# Patient Record
Sex: FEMALE | Race: WHITE | Employment: OTHER | URBAN - METROPOLITAN AREA
[De-identification: names, ages, dates, MRNs, and addresses within clinical notes are randomized per-mention and may not be internally consistent; named-entity substitution may affect disease eponyms.]

---

## 2017-01-12 ENCOUNTER — CARE COORDINATION (OUTPATIENT)
Dept: CARE COORDINATION | Age: 82
End: 2017-01-12

## 2017-01-16 ENCOUNTER — OFFICE VISIT (OUTPATIENT)
Dept: FAMILY MEDICINE CLINIC | Age: 82
End: 2017-01-16

## 2017-01-16 VITALS
SYSTOLIC BLOOD PRESSURE: 132 MMHG | WEIGHT: 130.2 LBS | DIASTOLIC BLOOD PRESSURE: 78 MMHG | BODY MASS INDEX: 23.96 KG/M2 | HEART RATE: 72 BPM | HEIGHT: 62 IN

## 2017-01-16 DIAGNOSIS — F17.200 TOBACCO USE DISORDER: ICD-10-CM

## 2017-01-16 DIAGNOSIS — I10 HYPERTENSION, ESSENTIAL, BENIGN: Primary | ICD-10-CM

## 2017-01-16 DIAGNOSIS — K59.04 CHRONIC IDIOPATHIC CONSTIPATION: ICD-10-CM

## 2017-01-16 PROCEDURE — G8427 DOCREV CUR MEDS BY ELIG CLIN: HCPCS | Performed by: FAMILY MEDICINE

## 2017-01-16 PROCEDURE — 4040F PNEUMOC VAC/ADMIN/RCVD: CPT | Performed by: FAMILY MEDICINE

## 2017-01-16 PROCEDURE — 99213 OFFICE O/P EST LOW 20 MIN: CPT | Performed by: FAMILY MEDICINE

## 2017-01-16 PROCEDURE — G8400 PT W/DXA NO RESULTS DOC: HCPCS | Performed by: FAMILY MEDICINE

## 2017-01-16 PROCEDURE — 1090F PRES/ABSN URINE INCON ASSESS: CPT | Performed by: FAMILY MEDICINE

## 2017-01-16 PROCEDURE — G8420 CALC BMI NORM PARAMETERS: HCPCS | Performed by: FAMILY MEDICINE

## 2017-01-16 PROCEDURE — G8484 FLU IMMUNIZE NO ADMIN: HCPCS | Performed by: FAMILY MEDICINE

## 2017-01-16 PROCEDURE — 4004F PT TOBACCO SCREEN RCVD TLK: CPT | Performed by: FAMILY MEDICINE

## 2017-01-16 PROCEDURE — 1123F ACP DISCUSS/DSCN MKR DOCD: CPT | Performed by: FAMILY MEDICINE

## 2017-01-16 ASSESSMENT — PATIENT HEALTH QUESTIONNAIRE - PHQ9
SUM OF ALL RESPONSES TO PHQ QUESTIONS 1-9: 0
1. LITTLE INTEREST OR PLEASURE IN DOING THINGS: 0
2. FEELING DOWN, DEPRESSED OR HOPELESS: 0
SUM OF ALL RESPONSES TO PHQ9 QUESTIONS 1 & 2: 0

## 2017-04-17 ENCOUNTER — OFFICE VISIT (OUTPATIENT)
Dept: FAMILY MEDICINE CLINIC | Age: 82
End: 2017-04-17

## 2017-04-17 VITALS
SYSTOLIC BLOOD PRESSURE: 130 MMHG | BODY MASS INDEX: 21.85 KG/M2 | WEIGHT: 128 LBS | DIASTOLIC BLOOD PRESSURE: 80 MMHG | HEART RATE: 70 BPM | HEIGHT: 64 IN

## 2017-04-17 DIAGNOSIS — R31.9 URINARY TRACT INFECTION WITH HEMATURIA, SITE UNSPECIFIED: Primary | ICD-10-CM

## 2017-04-17 DIAGNOSIS — E46 MALNUTRITION (HCC): ICD-10-CM

## 2017-04-17 DIAGNOSIS — N39.0 URINARY TRACT INFECTION WITH HEMATURIA, SITE UNSPECIFIED: Primary | ICD-10-CM

## 2017-04-17 LAB
BILIRUBIN, POC: NORMAL
BLOOD URINE, POC: NORMAL
CLARITY, POC: NORMAL
COLOR, POC: NORMAL
GLUCOSE URINE, POC: NORMAL
KETONES, POC: NORMAL
LEUKOCYTE EST, POC: NORMAL
NITRITE, POC: NORMAL
PH, POC: 5.5
PROTEIN, POC: NORMAL
SPECIFIC GRAVITY, POC: 1.02
UROBILINOGEN, POC: 0.2

## 2017-04-17 PROCEDURE — 4004F PT TOBACCO SCREEN RCVD TLK: CPT | Performed by: FAMILY MEDICINE

## 2017-04-17 PROCEDURE — G8427 DOCREV CUR MEDS BY ELIG CLIN: HCPCS | Performed by: FAMILY MEDICINE

## 2017-04-17 PROCEDURE — G8419 CALC BMI OUT NRM PARAM NOF/U: HCPCS | Performed by: FAMILY MEDICINE

## 2017-04-17 PROCEDURE — 99214 OFFICE O/P EST MOD 30 MIN: CPT | Performed by: FAMILY MEDICINE

## 2017-04-17 PROCEDURE — G8400 PT W/DXA NO RESULTS DOC: HCPCS | Performed by: FAMILY MEDICINE

## 2017-04-17 PROCEDURE — 4040F PNEUMOC VAC/ADMIN/RCVD: CPT | Performed by: FAMILY MEDICINE

## 2017-04-17 PROCEDURE — 81002 URINALYSIS NONAUTO W/O SCOPE: CPT | Performed by: FAMILY MEDICINE

## 2017-04-17 PROCEDURE — 1123F ACP DISCUSS/DSCN MKR DOCD: CPT | Performed by: FAMILY MEDICINE

## 2017-04-17 PROCEDURE — 1090F PRES/ABSN URINE INCON ASSESS: CPT | Performed by: FAMILY MEDICINE

## 2017-04-20 ENCOUNTER — CARE COORDINATION (OUTPATIENT)
Dept: CARE COORDINATION | Age: 82
End: 2017-04-20

## 2017-04-20 ENCOUNTER — TELEPHONE (OUTPATIENT)
Dept: FAMILY MEDICINE CLINIC | Age: 82
End: 2017-04-20

## 2017-04-21 RX ORDER — CIPROFLOXACIN 250 MG/1
250 TABLET, FILM COATED ORAL 2 TIMES DAILY
Qty: 10 TABLET | Refills: 0 | Status: SHIPPED | OUTPATIENT
Start: 2017-04-21 | End: 2017-04-26

## 2017-04-27 ENCOUNTER — TELEPHONE (OUTPATIENT)
Dept: FAMILY MEDICINE CLINIC | Age: 82
End: 2017-04-27

## 2017-05-02 ENCOUNTER — TELEPHONE (OUTPATIENT)
Dept: FAMILY MEDICINE CLINIC | Age: 82
End: 2017-05-02

## 2017-05-05 ENCOUNTER — OFFICE VISIT (OUTPATIENT)
Dept: FAMILY MEDICINE CLINIC | Age: 82
End: 2017-05-05

## 2017-05-05 ENCOUNTER — TELEPHONE (OUTPATIENT)
Dept: FAMILY MEDICINE CLINIC | Age: 82
End: 2017-05-05

## 2017-05-05 VITALS
DIASTOLIC BLOOD PRESSURE: 80 MMHG | SYSTOLIC BLOOD PRESSURE: 140 MMHG | BODY MASS INDEX: 22.6 KG/M2 | WEIGHT: 122.8 LBS | HEART RATE: 74 BPM | HEIGHT: 62 IN

## 2017-05-05 DIAGNOSIS — E53.8 VITAMIN B12 DEFICIENCY: Primary | ICD-10-CM

## 2017-05-05 DIAGNOSIS — F03.90 DEMENTIA WITHOUT BEHAVIORAL DISTURBANCE, UNSPECIFIED DEMENTIA TYPE: ICD-10-CM

## 2017-05-05 LAB
A/G RATIO: 1 (ref 1.1–2.2)
ALBUMIN SERPL-MCNC: 3.2 G/DL (ref 3.4–5)
ALP BLD-CCNC: 63 U/L (ref 40–129)
ALT SERPL-CCNC: 21 U/L (ref 10–40)
ANION GAP SERPL CALCULATED.3IONS-SCNC: 14 MMOL/L (ref 3–16)
AST SERPL-CCNC: 27 U/L (ref 15–37)
BASOPHILS ABSOLUTE: 0 K/UL (ref 0–0.2)
BASOPHILS RELATIVE PERCENT: 0.5 %
BILIRUB SERPL-MCNC: 0.4 MG/DL (ref 0–1)
BUN BLDV-MCNC: 9 MG/DL (ref 7–20)
CALCIUM SERPL-MCNC: 8.7 MG/DL (ref 8.3–10.6)
CHLORIDE BLD-SCNC: 93 MMOL/L (ref 99–110)
CO2: 24 MMOL/L (ref 21–32)
CREAT SERPL-MCNC: 0.6 MG/DL (ref 0.6–1.2)
EOSINOPHILS ABSOLUTE: 0 K/UL (ref 0–0.6)
EOSINOPHILS RELATIVE PERCENT: 0.6 %
FOLATE: 13.92 NG/ML (ref 4.78–24.2)
GFR AFRICAN AMERICAN: >60
GFR NON-AFRICAN AMERICAN: >60
GLOBULIN: 3.3 G/DL
GLUCOSE BLD-MCNC: 119 MG/DL (ref 70–99)
HCT VFR BLD CALC: 37.2 % (ref 36–48)
HEMOGLOBIN: 12.4 G/DL (ref 12–16)
LYMPHOCYTES ABSOLUTE: 0.9 K/UL (ref 1–5.1)
LYMPHOCYTES RELATIVE PERCENT: 31.1 %
MCH RBC QN AUTO: 27.9 PG (ref 26–34)
MCHC RBC AUTO-ENTMCNC: 33.2 G/DL (ref 31–36)
MCV RBC AUTO: 84.2 FL (ref 80–100)
MONOCYTES ABSOLUTE: 0.5 K/UL (ref 0–1.3)
MONOCYTES RELATIVE PERCENT: 18 %
NEUTROPHILS ABSOLUTE: 1.4 K/UL (ref 1.7–7.7)
NEUTROPHILS RELATIVE PERCENT: 49.8 %
PDW BLD-RTO: 16.3 % (ref 12.4–15.4)
PLATELET # BLD: 353 K/UL (ref 135–450)
PMV BLD AUTO: 7.4 FL (ref 5–10.5)
POTASSIUM SERPL-SCNC: 4.4 MMOL/L (ref 3.5–5.1)
RBC # BLD: 4.42 M/UL (ref 4–5.2)
SODIUM BLD-SCNC: 131 MMOL/L (ref 136–145)
TOTAL PROTEIN: 6.5 G/DL (ref 6.4–8.2)
TSH SERPL DL<=0.05 MIU/L-ACNC: 2.7 UIU/ML (ref 0.27–4.2)
VITAMIN B-12: 752 PG/ML (ref 211–911)
WBC # BLD: 2.8 K/UL (ref 4–11)

## 2017-05-05 PROCEDURE — 4040F PNEUMOC VAC/ADMIN/RCVD: CPT | Performed by: FAMILY MEDICINE

## 2017-05-05 PROCEDURE — G8400 PT W/DXA NO RESULTS DOC: HCPCS | Performed by: FAMILY MEDICINE

## 2017-05-05 PROCEDURE — 1090F PRES/ABSN URINE INCON ASSESS: CPT | Performed by: FAMILY MEDICINE

## 2017-05-05 PROCEDURE — 4004F PT TOBACCO SCREEN RCVD TLK: CPT | Performed by: FAMILY MEDICINE

## 2017-05-05 PROCEDURE — 1123F ACP DISCUSS/DSCN MKR DOCD: CPT | Performed by: FAMILY MEDICINE

## 2017-05-05 PROCEDURE — G8419 CALC BMI OUT NRM PARAM NOF/U: HCPCS | Performed by: FAMILY MEDICINE

## 2017-05-05 PROCEDURE — 99214 OFFICE O/P EST MOD 30 MIN: CPT | Performed by: FAMILY MEDICINE

## 2017-05-05 PROCEDURE — 36415 COLL VENOUS BLD VENIPUNCTURE: CPT | Performed by: FAMILY MEDICINE

## 2017-05-05 PROCEDURE — G8427 DOCREV CUR MEDS BY ELIG CLIN: HCPCS | Performed by: FAMILY MEDICINE

## 2017-05-05 RX ORDER — FLUOXETINE HYDROCHLORIDE 10 MG/1
10 CAPSULE ORAL DAILY
Qty: 30 CAPSULE | Refills: 3 | Status: ON HOLD | OUTPATIENT
Start: 2017-05-05 | End: 2017-05-16 | Stop reason: HOSPADM

## 2017-05-08 ENCOUNTER — TELEPHONE (OUTPATIENT)
Dept: CARE COORDINATION | Age: 82
End: 2017-05-08

## 2017-05-11 ENCOUNTER — CARE COORDINATION (OUTPATIENT)
Dept: CARE COORDINATION | Age: 82
End: 2017-05-11

## 2017-05-14 PROBLEM — E87.1 HYPONATREMIA: Status: ACTIVE | Noted: 2017-05-14

## 2017-05-14 PROBLEM — R62.7 FAILURE TO THRIVE IN ADULT: Status: ACTIVE | Noted: 2017-05-14

## 2017-05-14 PROBLEM — F33.1 MODERATE EPISODE OF RECURRENT MAJOR DEPRESSIVE DISORDER (HCC): Status: ACTIVE | Noted: 2017-05-14

## 2017-05-17 ENCOUNTER — CARE COORDINATION (OUTPATIENT)
Dept: CASE MANAGEMENT | Age: 82
End: 2017-05-17

## 2017-05-19 ENCOUNTER — CARE COORDINATION (OUTPATIENT)
Dept: CASE MANAGEMENT | Age: 82
End: 2017-05-19

## 2017-05-26 ENCOUNTER — OFFICE VISIT (OUTPATIENT)
Dept: FAMILY MEDICINE CLINIC | Age: 82
End: 2017-05-26

## 2017-05-26 VITALS
WEIGHT: 120.2 LBS | HEART RATE: 72 BPM | HEIGHT: 63 IN | SYSTOLIC BLOOD PRESSURE: 142 MMHG | DIASTOLIC BLOOD PRESSURE: 94 MMHG | BODY MASS INDEX: 21.3 KG/M2

## 2017-05-26 DIAGNOSIS — I10 HYPERTENSION, ESSENTIAL, BENIGN: Primary | ICD-10-CM

## 2017-05-26 DIAGNOSIS — R00.1 BRADYCARDIA: ICD-10-CM

## 2017-05-26 DIAGNOSIS — R41.89 PSEUDODEMENTIA: ICD-10-CM

## 2017-05-26 PROCEDURE — G8419 CALC BMI OUT NRM PARAM NOF/U: HCPCS | Performed by: FAMILY MEDICINE

## 2017-05-26 PROCEDURE — G8427 DOCREV CUR MEDS BY ELIG CLIN: HCPCS | Performed by: FAMILY MEDICINE

## 2017-05-26 PROCEDURE — 1111F DSCHRG MED/CURRENT MED MERGE: CPT | Performed by: FAMILY MEDICINE

## 2017-05-26 PROCEDURE — 1123F ACP DISCUSS/DSCN MKR DOCD: CPT | Performed by: FAMILY MEDICINE

## 2017-05-26 PROCEDURE — 99214 OFFICE O/P EST MOD 30 MIN: CPT | Performed by: FAMILY MEDICINE

## 2017-05-26 PROCEDURE — 1090F PRES/ABSN URINE INCON ASSESS: CPT | Performed by: FAMILY MEDICINE

## 2017-05-26 PROCEDURE — 4004F PT TOBACCO SCREEN RCVD TLK: CPT | Performed by: FAMILY MEDICINE

## 2017-05-26 PROCEDURE — 4040F PNEUMOC VAC/ADMIN/RCVD: CPT | Performed by: FAMILY MEDICINE

## 2017-05-26 PROCEDURE — G8400 PT W/DXA NO RESULTS DOC: HCPCS | Performed by: FAMILY MEDICINE

## 2017-05-26 RX ORDER — FLUOXETINE 10 MG/1
10 CAPSULE ORAL DAILY
Qty: 90 CAPSULE | Refills: 3 | Status: SHIPPED | OUTPATIENT
Start: 2017-05-26 | End: 2017-06-20

## 2017-06-13 ENCOUNTER — CARE COORDINATION (OUTPATIENT)
Dept: CARE COORDINATION | Age: 82
End: 2017-06-13

## 2017-06-19 ENCOUNTER — TELEPHONE (OUTPATIENT)
Dept: FAMILY MEDICINE CLINIC | Age: 82
End: 2017-06-19

## 2017-06-19 ENCOUNTER — OFFICE VISIT (OUTPATIENT)
Dept: FAMILY MEDICINE CLINIC | Age: 82
End: 2017-06-19

## 2017-06-19 VITALS
OXYGEN SATURATION: 97 % | HEART RATE: 83 BPM | TEMPERATURE: 98.3 F | SYSTOLIC BLOOD PRESSURE: 120 MMHG | DIASTOLIC BLOOD PRESSURE: 60 MMHG | WEIGHT: 121 LBS | HEIGHT: 61 IN | RESPIRATION RATE: 16 BRPM | BODY MASS INDEX: 22.84 KG/M2

## 2017-06-19 DIAGNOSIS — E87.1 HYPONATREMIA: ICD-10-CM

## 2017-06-19 DIAGNOSIS — R41.0 CONFUSION: ICD-10-CM

## 2017-06-19 DIAGNOSIS — I49.9 IRREGULAR HEART BEATS: ICD-10-CM

## 2017-06-19 DIAGNOSIS — R63.0 LACK OF APPETITE: Primary | ICD-10-CM

## 2017-06-19 DIAGNOSIS — D70.8 OTHER NEUTROPENIA (HCC): ICD-10-CM

## 2017-06-19 LAB
ALBUMIN SERPL-MCNC: 3.5 G/DL (ref 3.4–5)
ALP BLD-CCNC: 73 U/L (ref 40–129)
ALT SERPL-CCNC: 15 U/L (ref 10–40)
ANION GAP SERPL CALCULATED.3IONS-SCNC: 16 MMOL/L (ref 3–16)
AST SERPL-CCNC: 20 U/L (ref 15–37)
BASOPHILS ABSOLUTE: 0 K/UL (ref 0–0.2)
BASOPHILS RELATIVE PERCENT: 0.4 %
BILIRUB SERPL-MCNC: 0.4 MG/DL (ref 0–1)
BILIRUBIN DIRECT: <0.2 MG/DL (ref 0–0.3)
BILIRUBIN, INDIRECT: NORMAL MG/DL (ref 0–1)
BILIRUBIN, POC: ABNORMAL
BLOOD URINE, POC: ABNORMAL
BUN BLDV-MCNC: 14 MG/DL (ref 7–20)
CALCIUM SERPL-MCNC: 8.9 MG/DL (ref 8.3–10.6)
CHLORIDE BLD-SCNC: 88 MMOL/L (ref 99–110)
CLARITY, POC: CLEAR
CO2: 22 MMOL/L (ref 21–32)
COLOR, POC: YELLOW
CREAT SERPL-MCNC: 0.6 MG/DL (ref 0.6–1.2)
EOSINOPHILS ABSOLUTE: 0 K/UL (ref 0–0.6)
EOSINOPHILS RELATIVE PERCENT: 0.6 %
GFR AFRICAN AMERICAN: >60
GFR NON-AFRICAN AMERICAN: >60
GLUCOSE BLD-MCNC: 94 MG/DL (ref 70–99)
GLUCOSE URINE, POC: ABNORMAL
HCT VFR BLD CALC: 36 % (ref 36–48)
HEMOGLOBIN: 12.2 G/DL (ref 12–16)
KETONES, POC: ABNORMAL
LEUKOCYTE EST, POC: ABNORMAL
LYMPHOCYTES ABSOLUTE: 1.4 K/UL (ref 1–5.1)
LYMPHOCYTES RELATIVE PERCENT: 46.6 %
MCH RBC QN AUTO: 28.6 PG (ref 26–34)
MCHC RBC AUTO-ENTMCNC: 33.9 G/DL (ref 31–36)
MCV RBC AUTO: 84.4 FL (ref 80–100)
MONOCYTES ABSOLUTE: 0.6 K/UL (ref 0–1.3)
MONOCYTES RELATIVE PERCENT: 20.6 %
NEUTROPHILS ABSOLUTE: 1 K/UL (ref 1.7–7.7)
NEUTROPHILS RELATIVE PERCENT: 31.8 %
NITRITE, POC: ABNORMAL
PDW BLD-RTO: 17.2 % (ref 12.4–15.4)
PH, POC: 6
PLATELET # BLD: 299 K/UL (ref 135–450)
PMV BLD AUTO: 6.9 FL (ref 5–10.5)
POTASSIUM SERPL-SCNC: 4.3 MMOL/L (ref 3.5–5.1)
PROTEIN, POC: ABNORMAL
RBC # BLD: 4.26 M/UL (ref 4–5.2)
SODIUM BLD-SCNC: 126 MMOL/L (ref 136–145)
SPECIFIC GRAVITY, POC: 1.01
TOTAL PROTEIN: 6.6 G/DL (ref 6.4–8.2)
UROBILINOGEN, POC: 0.2
WBC # BLD: 3.1 K/UL (ref 4–11)

## 2017-06-19 PROCEDURE — G8400 PT W/DXA NO RESULTS DOC: HCPCS | Performed by: FAMILY MEDICINE

## 2017-06-19 PROCEDURE — 36415 COLL VENOUS BLD VENIPUNCTURE: CPT | Performed by: FAMILY MEDICINE

## 2017-06-19 PROCEDURE — 4040F PNEUMOC VAC/ADMIN/RCVD: CPT | Performed by: FAMILY MEDICINE

## 2017-06-19 PROCEDURE — 99214 OFFICE O/P EST MOD 30 MIN: CPT | Performed by: FAMILY MEDICINE

## 2017-06-19 PROCEDURE — 4004F PT TOBACCO SCREEN RCVD TLK: CPT | Performed by: FAMILY MEDICINE

## 2017-06-19 PROCEDURE — 1090F PRES/ABSN URINE INCON ASSESS: CPT | Performed by: FAMILY MEDICINE

## 2017-06-19 PROCEDURE — 81002 URINALYSIS NONAUTO W/O SCOPE: CPT | Performed by: FAMILY MEDICINE

## 2017-06-19 PROCEDURE — G8427 DOCREV CUR MEDS BY ELIG CLIN: HCPCS | Performed by: FAMILY MEDICINE

## 2017-06-19 PROCEDURE — 93000 ELECTROCARDIOGRAM COMPLETE: CPT | Performed by: FAMILY MEDICINE

## 2017-06-19 PROCEDURE — 1123F ACP DISCUSS/DSCN MKR DOCD: CPT | Performed by: FAMILY MEDICINE

## 2017-06-19 PROCEDURE — G8420 CALC BMI NORM PARAMETERS: HCPCS | Performed by: FAMILY MEDICINE

## 2017-06-19 RX ORDER — ONDANSETRON 4 MG/1
4 TABLET, FILM COATED ORAL EVERY 6 HOURS PRN
Qty: 20 TABLET | Refills: 0 | Status: SHIPPED | OUTPATIENT
Start: 2017-06-19 | End: 2017-08-31 | Stop reason: ALTCHOICE

## 2017-06-20 ENCOUNTER — TELEPHONE (OUTPATIENT)
Dept: FAMILY MEDICINE CLINIC | Age: 82
End: 2017-06-20

## 2017-06-21 LAB — URINE CULTURE, ROUTINE: NORMAL

## 2017-06-23 ENCOUNTER — TELEPHONE (OUTPATIENT)
Dept: PHARMACY | Facility: CLINIC | Age: 82
End: 2017-06-23

## 2017-06-23 ENCOUNTER — CARE COORDINATION (OUTPATIENT)
Dept: CASE MANAGEMENT | Age: 82
End: 2017-06-23

## 2017-06-26 ENCOUNTER — TELEPHONE (OUTPATIENT)
Dept: FAMILY MEDICINE CLINIC | Age: 82
End: 2017-06-26

## 2017-06-26 DIAGNOSIS — E87.1 HYPONATREMIA: Primary | ICD-10-CM

## 2017-06-27 ENCOUNTER — CARE COORDINATION (OUTPATIENT)
Dept: CASE MANAGEMENT | Age: 82
End: 2017-06-27

## 2017-06-28 ENCOUNTER — CARE COORDINATION (OUTPATIENT)
Dept: CARE COORDINATION | Age: 82
End: 2017-06-28

## 2017-07-11 ENCOUNTER — CARE COORDINATOR VISIT (OUTPATIENT)
Dept: CARE COORDINATION | Age: 82
End: 2017-07-11

## 2017-07-11 ENCOUNTER — OFFICE VISIT (OUTPATIENT)
Dept: FAMILY MEDICINE CLINIC | Age: 82
End: 2017-07-11

## 2017-07-11 VITALS
OXYGEN SATURATION: 97 % | SYSTOLIC BLOOD PRESSURE: 152 MMHG | HEIGHT: 61 IN | WEIGHT: 125 LBS | BODY MASS INDEX: 23.6 KG/M2 | DIASTOLIC BLOOD PRESSURE: 87 MMHG | HEART RATE: 95 BPM

## 2017-07-11 DIAGNOSIS — G31.84 MILD COGNITIVE IMPAIRMENT: ICD-10-CM

## 2017-07-11 DIAGNOSIS — R11.0 NAUSEA: ICD-10-CM

## 2017-07-11 DIAGNOSIS — E87.1 HYPONATREMIA: Primary | ICD-10-CM

## 2017-07-11 DIAGNOSIS — R41.89 COGNITIVE DEFICITS: Primary | ICD-10-CM

## 2017-07-11 DIAGNOSIS — F33.1 MODERATE EPISODE OF RECURRENT MAJOR DEPRESSIVE DISORDER (HCC): ICD-10-CM

## 2017-07-11 DIAGNOSIS — D70.8 OTHER NEUTROPENIA (HCC): ICD-10-CM

## 2017-07-11 LAB
ANION GAP SERPL CALCULATED.3IONS-SCNC: 15 MMOL/L (ref 3–16)
BUN BLDV-MCNC: 13 MG/DL (ref 7–20)
CALCIUM SERPL-MCNC: 8.9 MG/DL (ref 8.3–10.6)
CHLORIDE BLD-SCNC: 92 MMOL/L (ref 99–110)
CO2: 24 MMOL/L (ref 21–32)
CREAT SERPL-MCNC: 0.6 MG/DL (ref 0.6–1.2)
GFR AFRICAN AMERICAN: >60
GFR NON-AFRICAN AMERICAN: >60
GLUCOSE BLD-MCNC: 74 MG/DL (ref 70–99)
POTASSIUM SERPL-SCNC: 4.8 MMOL/L (ref 3.5–5.1)
SODIUM BLD-SCNC: 131 MMOL/L (ref 136–145)

## 2017-07-11 PROCEDURE — 99215 OFFICE O/P EST HI 40 MIN: CPT | Performed by: FAMILY MEDICINE

## 2017-07-11 PROCEDURE — 36415 COLL VENOUS BLD VENIPUNCTURE: CPT | Performed by: FAMILY MEDICINE

## 2017-07-11 PROCEDURE — G8420 CALC BMI NORM PARAMETERS: HCPCS | Performed by: FAMILY MEDICINE

## 2017-07-11 PROCEDURE — 1111F DSCHRG MED/CURRENT MED MERGE: CPT | Performed by: FAMILY MEDICINE

## 2017-07-11 PROCEDURE — G8400 PT W/DXA NO RESULTS DOC: HCPCS | Performed by: FAMILY MEDICINE

## 2017-07-11 PROCEDURE — G8427 DOCREV CUR MEDS BY ELIG CLIN: HCPCS | Performed by: FAMILY MEDICINE

## 2017-07-11 PROCEDURE — 1123F ACP DISCUSS/DSCN MKR DOCD: CPT | Performed by: FAMILY MEDICINE

## 2017-07-11 PROCEDURE — 1090F PRES/ABSN URINE INCON ASSESS: CPT | Performed by: FAMILY MEDICINE

## 2017-07-11 PROCEDURE — 4040F PNEUMOC VAC/ADMIN/RCVD: CPT | Performed by: FAMILY MEDICINE

## 2017-07-11 PROCEDURE — 4004F PT TOBACCO SCREEN RCVD TLK: CPT | Performed by: FAMILY MEDICINE

## 2017-07-12 ENCOUNTER — OFFICE VISIT (OUTPATIENT)
Dept: PSYCHOLOGY | Age: 82
End: 2017-07-12

## 2017-07-12 ENCOUNTER — TELEPHONE (OUTPATIENT)
Dept: FAMILY MEDICINE CLINIC | Age: 82
End: 2017-07-12

## 2017-07-12 ENCOUNTER — NURSE ONLY (OUTPATIENT)
Dept: FAMILY MEDICINE CLINIC | Age: 82
End: 2017-07-12

## 2017-07-12 VITALS — SYSTOLIC BLOOD PRESSURE: 142 MMHG | DIASTOLIC BLOOD PRESSURE: 70 MMHG

## 2017-07-12 DIAGNOSIS — F41.9 ANXIETY: Primary | ICD-10-CM

## 2017-07-12 LAB
BASOPHILS ABSOLUTE: 0 K/UL (ref 0–0.2)
BASOPHILS RELATIVE PERCENT: 0.3 %
EOSINOPHILS ABSOLUTE: 0 K/UL (ref 0–0.6)
EOSINOPHILS RELATIVE PERCENT: 0.8 %
HCT VFR BLD CALC: 36 % (ref 36–48)
HEMATOLOGY PATH CONSULT: NO
HEMOGLOBIN: 12.4 G/DL (ref 12–16)
LYMPHOCYTES ABSOLUTE: 1.7 K/UL (ref 1–5.1)
LYMPHOCYTES RELATIVE PERCENT: 56.2 %
MCH RBC QN AUTO: 29.1 PG (ref 26–34)
MCHC RBC AUTO-ENTMCNC: 34.3 G/DL (ref 31–36)
MCV RBC AUTO: 84.9 FL (ref 80–100)
MONOCYTES ABSOLUTE: 0.9 K/UL (ref 0–1.3)
MONOCYTES RELATIVE PERCENT: 30.3 %
NEUTROPHILS ABSOLUTE: 0.4 K/UL (ref 1.7–7.7)
NEUTROPHILS RELATIVE PERCENT: 12.4 %
PDW BLD-RTO: 17.6 % (ref 12.4–15.4)
PLATELET # BLD: 259 K/UL (ref 135–450)
PMV BLD AUTO: 7.4 FL (ref 5–10.5)
RBC # BLD: 4.25 M/UL (ref 4–5.2)
WBC # BLD: 3.1 K/UL (ref 4–11)

## 2017-07-12 PROCEDURE — 90791 PSYCH DIAGNOSTIC EVALUATION: CPT | Performed by: PSYCHOLOGIST

## 2017-07-12 ASSESSMENT — PATIENT HEALTH QUESTIONNAIRE - PHQ9
8. MOVING OR SPEAKING SO SLOWLY THAT OTHER PEOPLE COULD HAVE NOTICED. OR THE OPPOSITE, BEING SO FIGETY OR RESTLESS THAT YOU HAVE BEEN MOVING AROUND A LOT MORE THAN USUAL: 0
10. IF YOU CHECKED OFF ANY PROBLEMS, HOW DIFFICULT HAVE THESE PROBLEMS MADE IT FOR YOU TO DO YOUR WORK, TAKE CARE OF THINGS AT HOME, OR GET ALONG WITH OTHER PEOPLE: 0
7. TROUBLE CONCENTRATING ON THINGS, SUCH AS READING THE NEWSPAPER OR WATCHING TELEVISION: 0
9. THOUGHTS THAT YOU WOULD BE BETTER OFF DEAD, OR OF HURTING YOURSELF: 0
1. LITTLE INTEREST OR PLEASURE IN DOING THINGS: 2
SUM OF ALL RESPONSES TO PHQ QUESTIONS 1-9: 4
5. POOR APPETITE OR OVEREATING: 1
3. TROUBLE FALLING OR STAYING ASLEEP: 0
6. FEELING BAD ABOUT YOURSELF - OR THAT YOU ARE A FAILURE OR HAVE LET YOURSELF OR YOUR FAMILY DOWN: 0
2. FEELING DOWN, DEPRESSED OR HOPELESS: 1
SUM OF ALL RESPONSES TO PHQ9 QUESTIONS 1 & 2: 3
4. FEELING TIRED OR HAVING LITTLE ENERGY: 0

## 2017-07-12 ASSESSMENT — ANXIETY QUESTIONNAIRES
5. BEING SO RESTLESS THAT IT IS HARD TO SIT STILL: 0-NOT AT ALL SURE
7. FEELING AFRAID AS IF SOMETHING AWFUL MIGHT HAPPEN: 3-NEARLY EVERY DAY
1. FEELING NERVOUS, ANXIOUS, OR ON EDGE: 1-SEVERAL DAYS
6. BECOMING EASILY ANNOYED OR IRRITABLE: 0-NOT AT ALL SURE
2. NOT BEING ABLE TO STOP OR CONTROL WORRYING: 2-OVER HALF THE DAYS
3. WORRYING TOO MUCH ABOUT DIFFERENT THINGS: 1-SEVERAL DAYS
GAD7 TOTAL SCORE: 8
4. TROUBLE RELAXING: 1-SEVERAL DAYS

## 2017-07-13 ENCOUNTER — OFFICE VISIT (OUTPATIENT)
Dept: FAMILY MEDICINE CLINIC | Age: 82
End: 2017-07-13

## 2017-07-13 VITALS
WEIGHT: 123 LBS | BODY MASS INDEX: 23.22 KG/M2 | RESPIRATION RATE: 18 BRPM | OXYGEN SATURATION: 98 % | HEART RATE: 100 BPM | DIASTOLIC BLOOD PRESSURE: 68 MMHG | HEIGHT: 61 IN | SYSTOLIC BLOOD PRESSURE: 136 MMHG

## 2017-07-13 DIAGNOSIS — D70.9 NEUTROPENIA, UNSPECIFIED TYPE (HCC): ICD-10-CM

## 2017-07-13 DIAGNOSIS — K59.00 CONSTIPATION, UNSPECIFIED CONSTIPATION TYPE: Primary | ICD-10-CM

## 2017-07-13 PROCEDURE — G8427 DOCREV CUR MEDS BY ELIG CLIN: HCPCS | Performed by: INTERNAL MEDICINE

## 2017-07-13 PROCEDURE — 4004F PT TOBACCO SCREEN RCVD TLK: CPT | Performed by: INTERNAL MEDICINE

## 2017-07-13 PROCEDURE — G8400 PT W/DXA NO RESULTS DOC: HCPCS | Performed by: INTERNAL MEDICINE

## 2017-07-13 PROCEDURE — 1123F ACP DISCUSS/DSCN MKR DOCD: CPT | Performed by: INTERNAL MEDICINE

## 2017-07-13 PROCEDURE — G8420 CALC BMI NORM PARAMETERS: HCPCS | Performed by: INTERNAL MEDICINE

## 2017-07-13 PROCEDURE — 4040F PNEUMOC VAC/ADMIN/RCVD: CPT | Performed by: INTERNAL MEDICINE

## 2017-07-13 PROCEDURE — 1111F DSCHRG MED/CURRENT MED MERGE: CPT | Performed by: INTERNAL MEDICINE

## 2017-07-13 PROCEDURE — 1090F PRES/ABSN URINE INCON ASSESS: CPT | Performed by: INTERNAL MEDICINE

## 2017-07-13 PROCEDURE — 99213 OFFICE O/P EST LOW 20 MIN: CPT | Performed by: INTERNAL MEDICINE

## 2017-07-20 ENCOUNTER — TELEPHONE (OUTPATIENT)
Dept: FAMILY MEDICINE CLINIC | Age: 82
End: 2017-07-20

## 2017-07-24 ENCOUNTER — CARE COORDINATION (OUTPATIENT)
Dept: CARE COORDINATION | Age: 82
End: 2017-07-24

## 2017-07-24 RX ORDER — AMLODIPINE BESYLATE 5 MG/1
5 TABLET ORAL DAILY
Qty: 90 TABLET | Refills: 1 | Status: SHIPPED | OUTPATIENT
Start: 2017-07-24

## 2017-07-25 ENCOUNTER — CARE COORDINATION (OUTPATIENT)
Dept: CARE COORDINATION | Age: 82
End: 2017-07-25

## 2017-07-26 ENCOUNTER — TELEPHONE (OUTPATIENT)
Dept: FAMILY MEDICINE CLINIC | Age: 82
End: 2017-07-26

## 2017-07-27 ENCOUNTER — CARE COORDINATOR VISIT (OUTPATIENT)
Dept: CARE COORDINATION | Age: 82
End: 2017-07-27

## 2017-07-27 ENCOUNTER — OFFICE VISIT (OUTPATIENT)
Dept: FAMILY MEDICINE CLINIC | Age: 82
End: 2017-07-27

## 2017-07-27 VITALS
HEART RATE: 75 BPM | BODY MASS INDEX: 23.03 KG/M2 | HEIGHT: 61 IN | RESPIRATION RATE: 20 BRPM | OXYGEN SATURATION: 97 % | DIASTOLIC BLOOD PRESSURE: 64 MMHG | SYSTOLIC BLOOD PRESSURE: 142 MMHG | WEIGHT: 122 LBS

## 2017-07-27 DIAGNOSIS — E87.1 HYPONATREMIA: ICD-10-CM

## 2017-07-27 DIAGNOSIS — F03.90 MULTIFACTORIAL DEMENTIA (HCC): Primary | ICD-10-CM

## 2017-07-27 LAB
ALBUMIN SERPL-MCNC: 4 G/DL (ref 3.4–5)
ANION GAP SERPL CALCULATED.3IONS-SCNC: 15 MMOL/L (ref 3–16)
BUN BLDV-MCNC: 16 MG/DL (ref 7–20)
CALCIUM SERPL-MCNC: 9.1 MG/DL (ref 8.3–10.6)
CHLORIDE BLD-SCNC: 93 MMOL/L (ref 99–110)
CO2: 24 MMOL/L (ref 21–32)
CREAT SERPL-MCNC: 0.7 MG/DL (ref 0.6–1.2)
GFR AFRICAN AMERICAN: >60
GFR NON-AFRICAN AMERICAN: >60
GLUCOSE BLD-MCNC: 96 MG/DL (ref 70–99)
PHOSPHORUS: 3.4 MG/DL (ref 2.5–4.9)
POTASSIUM SERPL-SCNC: 4.6 MMOL/L (ref 3.5–5.1)
SODIUM BLD-SCNC: 132 MMOL/L (ref 136–145)

## 2017-07-27 PROCEDURE — G8400 PT W/DXA NO RESULTS DOC: HCPCS | Performed by: FAMILY MEDICINE

## 2017-07-27 PROCEDURE — 36415 COLL VENOUS BLD VENIPUNCTURE: CPT | Performed by: FAMILY MEDICINE

## 2017-07-27 PROCEDURE — 4004F PT TOBACCO SCREEN RCVD TLK: CPT | Performed by: FAMILY MEDICINE

## 2017-07-27 PROCEDURE — G8427 DOCREV CUR MEDS BY ELIG CLIN: HCPCS | Performed by: FAMILY MEDICINE

## 2017-07-27 PROCEDURE — 1090F PRES/ABSN URINE INCON ASSESS: CPT | Performed by: FAMILY MEDICINE

## 2017-07-27 PROCEDURE — G8420 CALC BMI NORM PARAMETERS: HCPCS | Performed by: FAMILY MEDICINE

## 2017-07-27 PROCEDURE — 1123F ACP DISCUSS/DSCN MKR DOCD: CPT | Performed by: FAMILY MEDICINE

## 2017-07-27 PROCEDURE — 4040F PNEUMOC VAC/ADMIN/RCVD: CPT | Performed by: FAMILY MEDICINE

## 2017-07-27 PROCEDURE — 99214 OFFICE O/P EST MOD 30 MIN: CPT | Performed by: FAMILY MEDICINE

## 2017-07-28 ENCOUNTER — CARE COORDINATION (OUTPATIENT)
Dept: CARE COORDINATION | Age: 82
End: 2017-07-28

## 2017-07-30 PROBLEM — F03.90 MULTIFACTORIAL DEMENTIA (HCC): Status: ACTIVE | Noted: 2017-07-30

## 2017-07-31 ENCOUNTER — CARE COORDINATOR VISIT (OUTPATIENT)
Dept: CARE COORDINATION | Age: 82
End: 2017-07-31

## 2017-07-31 ENCOUNTER — OFFICE VISIT (OUTPATIENT)
Dept: FAMILY MEDICINE CLINIC | Age: 82
End: 2017-07-31

## 2017-07-31 ENCOUNTER — TELEPHONE (OUTPATIENT)
Dept: FAMILY MEDICINE CLINIC | Age: 82
End: 2017-07-31

## 2017-07-31 ENCOUNTER — CARE COORDINATION (OUTPATIENT)
Dept: CARE COORDINATION | Age: 82
End: 2017-07-31

## 2017-07-31 VITALS
SYSTOLIC BLOOD PRESSURE: 136 MMHG | RESPIRATION RATE: 20 BRPM | DIASTOLIC BLOOD PRESSURE: 60 MMHG | HEART RATE: 126 BPM | BODY MASS INDEX: 23.03 KG/M2 | WEIGHT: 122 LBS | HEIGHT: 61 IN

## 2017-07-31 DIAGNOSIS — S61.412A LACERATION OF LEFT HAND WITHOUT FOREIGN BODY, INITIAL ENCOUNTER: ICD-10-CM

## 2017-07-31 DIAGNOSIS — S61.411A LACERATION OF RIGHT HAND WITHOUT FOREIGN BODY, INITIAL ENCOUNTER: ICD-10-CM

## 2017-07-31 DIAGNOSIS — R00.0 HEART RATE FAST: ICD-10-CM

## 2017-07-31 DIAGNOSIS — I49.8 VENTRICULAR BIGEMINY: Primary | ICD-10-CM

## 2017-07-31 LAB — TSH SERPL DL<=0.05 MIU/L-ACNC: 2.28 UIU/ML (ref 0.27–4.2)

## 2017-07-31 PROCEDURE — G8400 PT W/DXA NO RESULTS DOC: HCPCS | Performed by: FAMILY MEDICINE

## 2017-07-31 PROCEDURE — 4040F PNEUMOC VAC/ADMIN/RCVD: CPT | Performed by: FAMILY MEDICINE

## 2017-07-31 PROCEDURE — 1123F ACP DISCUSS/DSCN MKR DOCD: CPT | Performed by: FAMILY MEDICINE

## 2017-07-31 PROCEDURE — G8427 DOCREV CUR MEDS BY ELIG CLIN: HCPCS | Performed by: FAMILY MEDICINE

## 2017-07-31 PROCEDURE — 4004F PT TOBACCO SCREEN RCVD TLK: CPT | Performed by: FAMILY MEDICINE

## 2017-07-31 PROCEDURE — G8420 CALC BMI NORM PARAMETERS: HCPCS | Performed by: FAMILY MEDICINE

## 2017-07-31 PROCEDURE — 36415 COLL VENOUS BLD VENIPUNCTURE: CPT | Performed by: FAMILY MEDICINE

## 2017-07-31 PROCEDURE — 99214 OFFICE O/P EST MOD 30 MIN: CPT | Performed by: FAMILY MEDICINE

## 2017-07-31 PROCEDURE — 1090F PRES/ABSN URINE INCON ASSESS: CPT | Performed by: FAMILY MEDICINE

## 2017-07-31 PROCEDURE — 93000 ELECTROCARDIOGRAM COMPLETE: CPT | Performed by: FAMILY MEDICINE

## 2017-08-01 ENCOUNTER — CARE COORDINATION (OUTPATIENT)
Dept: CARE COORDINATION | Age: 82
End: 2017-08-01

## 2017-08-03 ENCOUNTER — CARE COORDINATION (OUTPATIENT)
Dept: CARE COORDINATION | Age: 82
End: 2017-08-03

## 2017-08-09 ENCOUNTER — HOSPITAL ENCOUNTER (OUTPATIENT)
Dept: OTHER | Age: 82
Discharge: HOME | End: 2017-08-31
Attending: FAMILY MEDICINE | Admitting: FAMILY MEDICINE

## 2017-08-15 ENCOUNTER — CARE COORDINATION (OUTPATIENT)
Dept: CARE COORDINATION | Age: 82
End: 2017-08-15

## 2017-08-15 RX ORDER — METOPROLOL SUCCINATE 50 MG/1
50 TABLET, EXTENDED RELEASE ORAL
COMMUNITY
Start: 2017-08-14 | End: 2017-08-31 | Stop reason: ALTCHOICE

## 2017-08-31 PROBLEM — R55 SYNCOPE: Status: ACTIVE | Noted: 2017-08-31

## 2017-08-31 PROBLEM — E87.1 HYPONATREMIA: Status: ACTIVE | Noted: 2017-08-31

## 2017-09-12 ENCOUNTER — CARE COORDINATION (OUTPATIENT)
Dept: CASE MANAGEMENT | Age: 82
End: 2017-09-12

## 2017-10-10 ENCOUNTER — CARE COORDINATION (OUTPATIENT)
Dept: CASE MANAGEMENT | Age: 82
End: 2017-10-10

## 2017-10-18 ENCOUNTER — CARE COORDINATION (OUTPATIENT)
Dept: CARE COORDINATION | Age: 82
End: 2017-10-18

## 2019-03-06 ENCOUNTER — OFFICE VISIT (OUTPATIENT)
Dept: ORTHOPEDIC SURGERY | Age: 84
End: 2019-03-06
Payer: COMMERCIAL

## 2019-03-06 VITALS
RESPIRATION RATE: 16 BRPM | WEIGHT: 119 LBS | HEIGHT: 60 IN | DIASTOLIC BLOOD PRESSURE: 67 MMHG | SYSTOLIC BLOOD PRESSURE: 132 MMHG | HEART RATE: 56 BPM | BODY MASS INDEX: 23.36 KG/M2

## 2019-03-06 DIAGNOSIS — M79.672 FOOT PAIN, LEFT: ICD-10-CM

## 2019-03-06 DIAGNOSIS — M20.5X2 CROSSOVER TOE DEFORMITY OF LEFT FOOT: Primary | ICD-10-CM

## 2019-03-06 DIAGNOSIS — M21.612 BUNION, LEFT FOOT: ICD-10-CM

## 2019-03-06 PROCEDURE — 99203 OFFICE O/P NEW LOW 30 MIN: CPT | Performed by: ORTHOPAEDIC SURGERY

## 2019-03-08 ENCOUNTER — ANESTHESIA EVENT (OUTPATIENT)
Dept: OPERATING ROOM | Age: 84
End: 2019-03-08
Payer: COMMERCIAL

## 2019-03-08 RX ORDER — ACETAMINOPHEN 325 MG/1
650 TABLET ORAL EVERY 12 HOURS
COMMUNITY

## 2019-03-08 RX ORDER — TROLAMINE SALICYLATE 10 G/100G
CREAM TOPICAL EVERY 6 HOURS PRN
COMMUNITY

## 2019-03-08 RX ORDER — FUROSEMIDE 20 MG/1
TABLET ORAL
COMMUNITY

## 2019-03-08 RX ORDER — LANOLIN ALCOHOL/MO/W.PET/CERES
3 CREAM (GRAM) TOPICAL
COMMUNITY

## 2019-03-08 RX ORDER — ACETAMINOPHEN 325 MG/1
650 TABLET ORAL
COMMUNITY

## 2019-03-08 RX ORDER — ESCITALOPRAM OXALATE 20 MG/1
20 TABLET ORAL DAILY
COMMUNITY

## 2019-03-08 RX ORDER — RISPERIDONE 0.5 MG/1
0.5 TABLET, FILM COATED ORAL DAILY
COMMUNITY

## 2019-03-08 RX ORDER — CHOLECALCIFEROL (VITAMIN D3) 1250 MCG
1 CAPSULE ORAL WEEKLY
COMMUNITY

## 2019-03-10 PROBLEM — M20.5X2 CROSSOVER TOE DEFORMITY OF LEFT FOOT: Status: ACTIVE | Noted: 2019-03-10

## 2019-03-10 PROBLEM — M21.612 BUNION, LEFT FOOT: Status: ACTIVE | Noted: 2019-03-10

## 2019-03-11 ENCOUNTER — ANESTHESIA (OUTPATIENT)
Dept: OPERATING ROOM | Age: 84
End: 2019-03-11
Payer: COMMERCIAL

## 2019-03-11 ENCOUNTER — HOSPITAL ENCOUNTER (OUTPATIENT)
Age: 84
Setting detail: OUTPATIENT SURGERY
Discharge: HOME | End: 2019-03-11
Attending: ORTHOPAEDIC SURGERY | Admitting: ORTHOPAEDIC SURGERY
Payer: COMMERCIAL

## 2019-03-11 ENCOUNTER — APPOINTMENT (OUTPATIENT)
Dept: GENERAL RADIOLOGY | Age: 84
End: 2019-03-11
Attending: ORTHOPAEDIC SURGERY
Payer: COMMERCIAL

## 2019-03-11 VITALS
HEART RATE: 84 BPM | TEMPERATURE: 97 F | SYSTOLIC BLOOD PRESSURE: 147 MMHG | RESPIRATION RATE: 17 BRPM | WEIGHT: 164.02 LBS | DIASTOLIC BLOOD PRESSURE: 75 MMHG | HEIGHT: 60 IN | BODY MASS INDEX: 32.2 KG/M2 | OXYGEN SATURATION: 94 %

## 2019-03-11 VITALS
SYSTOLIC BLOOD PRESSURE: 144 MMHG | RESPIRATION RATE: 1 BRPM | DIASTOLIC BLOOD PRESSURE: 64 MMHG | OXYGEN SATURATION: 100 %

## 2019-03-11 DIAGNOSIS — M20.5X2 CROSSOVER TOE DEFORMITY OF LEFT FOOT: ICD-10-CM

## 2019-03-11 DIAGNOSIS — M21.612 BUNION, LEFT FOOT: Primary | ICD-10-CM

## 2019-03-11 PROCEDURE — 3600000004 HC SURGERY LEVEL 4 BASE: Performed by: ORTHOPAEDIC SURGERY

## 2019-03-11 PROCEDURE — 6370000000 HC RX 637 (ALT 250 FOR IP): Performed by: ANESTHESIOLOGY

## 2019-03-11 PROCEDURE — 2500000003 HC RX 250 WO HCPCS

## 2019-03-11 PROCEDURE — 28292 COR HLX VLGS RSC PRX PHLX BS: CPT | Performed by: ORTHOPAEDIC SURGERY

## 2019-03-11 PROCEDURE — 2720000010 HC SURG SUPPLY STERILE: Performed by: ORTHOPAEDIC SURGERY

## 2019-03-11 PROCEDURE — 6360000002 HC RX W HCPCS: Performed by: NURSE ANESTHETIST, CERTIFIED REGISTERED

## 2019-03-11 PROCEDURE — 7100000000 HC PACU RECOVERY - FIRST 15 MIN: Performed by: ORTHOPAEDIC SURGERY

## 2019-03-11 PROCEDURE — 28292 COR HLX VLGS RSC PRX PHLX BS: CPT | Performed by: NURSE PRACTITIONER

## 2019-03-11 PROCEDURE — 2500000003 HC RX 250 WO HCPCS: Performed by: ANESTHESIOLOGY

## 2019-03-11 PROCEDURE — 3600000014 HC SURGERY LEVEL 4 ADDTL 15MIN: Performed by: ORTHOPAEDIC SURGERY

## 2019-03-11 PROCEDURE — 2500000003 HC RX 250 WO HCPCS: Performed by: ORTHOPAEDIC SURGERY

## 2019-03-11 PROCEDURE — 7100000001 HC PACU RECOVERY - ADDTL 15 MIN: Performed by: ORTHOPAEDIC SURGERY

## 2019-03-11 PROCEDURE — 3700000001 HC ADD 15 MINUTES (ANESTHESIA): Performed by: ORTHOPAEDIC SURGERY

## 2019-03-11 PROCEDURE — 2580000003 HC RX 258: Performed by: ORTHOPAEDIC SURGERY

## 2019-03-11 PROCEDURE — 7100000010 HC PHASE II RECOVERY - FIRST 15 MIN: Performed by: ORTHOPAEDIC SURGERY

## 2019-03-11 PROCEDURE — 28820 AMPUTATION OF TOE: CPT | Performed by: ORTHOPAEDIC SURGERY

## 2019-03-11 PROCEDURE — 88305 TISSUE EXAM BY PATHOLOGIST: CPT

## 2019-03-11 PROCEDURE — 6360000002 HC RX W HCPCS: Performed by: ORTHOPAEDIC SURGERY

## 2019-03-11 PROCEDURE — 2500000003 HC RX 250 WO HCPCS: Performed by: NURSE ANESTHETIST, CERTIFIED REGISTERED

## 2019-03-11 PROCEDURE — 6360000002 HC RX W HCPCS: Performed by: ANESTHESIOLOGY

## 2019-03-11 PROCEDURE — 7100000011 HC PHASE II RECOVERY - ADDTL 15 MIN: Performed by: ORTHOPAEDIC SURGERY

## 2019-03-11 PROCEDURE — 2580000003 HC RX 258: Performed by: ANESTHESIOLOGY

## 2019-03-11 PROCEDURE — 2709999900 HC NON-CHARGEABLE SUPPLY: Performed by: ORTHOPAEDIC SURGERY

## 2019-03-11 PROCEDURE — 3700000000 HC ANESTHESIA ATTENDED CARE: Performed by: ORTHOPAEDIC SURGERY

## 2019-03-11 RX ORDER — MORPHINE SULFATE 2 MG/ML
2 INJECTION, SOLUTION INTRAMUSCULAR; INTRAVENOUS EVERY 5 MIN PRN
Status: DISCONTINUED | OUTPATIENT
Start: 2019-03-11 | End: 2019-03-11 | Stop reason: HOSPADM

## 2019-03-11 RX ORDER — OXYCODONE HYDROCHLORIDE 5 MG/1
10 TABLET ORAL PRN
Status: COMPLETED | OUTPATIENT
Start: 2019-03-11 | End: 2019-03-11

## 2019-03-11 RX ORDER — OXYCODONE HYDROCHLORIDE 5 MG/1
5 TABLET ORAL PRN
Status: COMPLETED | OUTPATIENT
Start: 2019-03-11 | End: 2019-03-11

## 2019-03-11 RX ORDER — BUPIVACAINE HYDROCHLORIDE 5 MG/ML
INJECTION, SOLUTION EPIDURAL; INTRACAUDAL
Status: COMPLETED | OUTPATIENT
Start: 2019-03-11 | End: 2019-03-11

## 2019-03-11 RX ORDER — LIDOCAINE HYDROCHLORIDE 20 MG/ML
INJECTION, SOLUTION INFILTRATION; PERINEURAL PRN
Status: DISCONTINUED | OUTPATIENT
Start: 2019-03-11 | End: 2019-03-11 | Stop reason: SDUPTHER

## 2019-03-11 RX ORDER — SODIUM CHLORIDE 9 MG/ML
INJECTION, SOLUTION INTRAVENOUS CONTINUOUS
Status: DISCONTINUED | OUTPATIENT
Start: 2019-03-11 | End: 2019-03-11 | Stop reason: HOSPADM

## 2019-03-11 RX ORDER — DEXAMETHASONE SODIUM PHOSPHATE 4 MG/ML
INJECTION, SOLUTION INTRA-ARTICULAR; INTRALESIONAL; INTRAMUSCULAR; INTRAVENOUS; SOFT TISSUE PRN
Status: DISCONTINUED | OUTPATIENT
Start: 2019-03-11 | End: 2019-03-11 | Stop reason: SDUPTHER

## 2019-03-11 RX ORDER — FENTANYL CITRATE 50 UG/ML
INJECTION, SOLUTION INTRAMUSCULAR; INTRAVENOUS PRN
Status: DISCONTINUED | OUTPATIENT
Start: 2019-03-11 | End: 2019-03-11 | Stop reason: SDUPTHER

## 2019-03-11 RX ORDER — MAGNESIUM HYDROXIDE 1200 MG/15ML
LIQUID ORAL CONTINUOUS PRN
Status: COMPLETED | OUTPATIENT
Start: 2019-03-11 | End: 2019-03-11

## 2019-03-11 RX ORDER — ONDANSETRON 2 MG/ML
4 INJECTION INTRAMUSCULAR; INTRAVENOUS
Status: DISCONTINUED | OUTPATIENT
Start: 2019-03-11 | End: 2019-03-11 | Stop reason: HOSPADM

## 2019-03-11 RX ORDER — MORPHINE SULFATE 2 MG/ML
1 INJECTION, SOLUTION INTRAMUSCULAR; INTRAVENOUS EVERY 5 MIN PRN
Status: DISCONTINUED | OUTPATIENT
Start: 2019-03-11 | End: 2019-03-11 | Stop reason: HOSPADM

## 2019-03-11 RX ORDER — DIPHENHYDRAMINE HCL 25 MG
25 TABLET ORAL EVERY 6 HOURS PRN
Status: DISCONTINUED | OUTPATIENT
Start: 2019-03-11 | End: 2019-03-11

## 2019-03-11 RX ORDER — GLYCOPYRROLATE 0.2 MG/ML
INJECTION INTRAMUSCULAR; INTRAVENOUS PRN
Status: DISCONTINUED | OUTPATIENT
Start: 2019-03-11 | End: 2019-03-11 | Stop reason: SDUPTHER

## 2019-03-11 RX ORDER — FENTANYL CITRATE 50 UG/ML
25 INJECTION, SOLUTION INTRAMUSCULAR; INTRAVENOUS EVERY 5 MIN PRN
Status: DISCONTINUED | OUTPATIENT
Start: 2019-03-11 | End: 2019-03-11 | Stop reason: HOSPADM

## 2019-03-11 RX ORDER — SODIUM CHLORIDE 0.9 % (FLUSH) 0.9 %
10 SYRINGE (ML) INJECTION PRN
Status: DISCONTINUED | OUTPATIENT
Start: 2019-03-11 | End: 2019-03-11 | Stop reason: HOSPADM

## 2019-03-11 RX ORDER — TRAMADOL HYDROCHLORIDE 50 MG/1
50 TABLET ORAL EVERY 6 HOURS PRN
Qty: 12 TABLET | Refills: 0 | Status: SHIPPED | OUTPATIENT
Start: 2019-03-11 | End: 2019-03-14

## 2019-03-11 RX ORDER — ONDANSETRON 2 MG/ML
INJECTION INTRAMUSCULAR; INTRAVENOUS PRN
Status: DISCONTINUED | OUTPATIENT
Start: 2019-03-11 | End: 2019-03-11 | Stop reason: SDUPTHER

## 2019-03-11 RX ORDER — FENTANYL CITRATE 50 UG/ML
50 INJECTION, SOLUTION INTRAMUSCULAR; INTRAVENOUS EVERY 5 MIN PRN
Status: DISCONTINUED | OUTPATIENT
Start: 2019-03-11 | End: 2019-03-11 | Stop reason: HOSPADM

## 2019-03-11 RX ORDER — CEPHALEXIN 500 MG/1
500 CAPSULE ORAL 4 TIMES DAILY
Qty: 20 CAPSULE | Refills: 0 | Status: SHIPPED | OUTPATIENT
Start: 2019-03-11 | End: 2019-03-16

## 2019-03-11 RX ORDER — PROPOFOL 10 MG/ML
INJECTION, EMULSION INTRAVENOUS PRN
Status: DISCONTINUED | OUTPATIENT
Start: 2019-03-11 | End: 2019-03-11 | Stop reason: SDUPTHER

## 2019-03-11 RX ORDER — MEPERIDINE HYDROCHLORIDE 25 MG/ML
12.5 INJECTION INTRAMUSCULAR; INTRAVENOUS; SUBCUTANEOUS EVERY 5 MIN PRN
Status: DISCONTINUED | OUTPATIENT
Start: 2019-03-11 | End: 2019-03-11 | Stop reason: HOSPADM

## 2019-03-11 RX ORDER — SODIUM CHLORIDE 0.9 % (FLUSH) 0.9 %
10 SYRINGE (ML) INJECTION EVERY 12 HOURS SCHEDULED
Status: DISCONTINUED | OUTPATIENT
Start: 2019-03-11 | End: 2019-03-11 | Stop reason: HOSPADM

## 2019-03-11 RX ORDER — DIPHENHYDRAMINE HCL 25 MG
25 TABLET ORAL EVERY 6 HOURS PRN
Status: DISCONTINUED | OUTPATIENT
Start: 2019-03-11 | End: 2019-03-11 | Stop reason: HOSPADM

## 2019-03-11 RX ORDER — EPHEDRINE SULFATE 50 MG/ML
INJECTION INTRAVENOUS PRN
Status: DISCONTINUED | OUTPATIENT
Start: 2019-03-11 | End: 2019-03-11 | Stop reason: SDUPTHER

## 2019-03-11 RX ADMIN — OXYCODONE HYDROCHLORIDE 5 MG: 5 TABLET ORAL at 11:28

## 2019-03-11 RX ADMIN — FAMOTIDINE 20 MG: 10 INJECTION, SOLUTION INTRAVENOUS at 07:53

## 2019-03-11 RX ADMIN — FENTANYL CITRATE 25 MCG: 50 INJECTION INTRAMUSCULAR; INTRAVENOUS at 08:58

## 2019-03-11 RX ADMIN — Medication 2 G: at 08:58

## 2019-03-11 RX ADMIN — PROPOFOL 125 MG: 10 INJECTION, EMULSION INTRAVENOUS at 09:04

## 2019-03-11 RX ADMIN — FENTANYL CITRATE 50 MCG: 50 INJECTION INTRAMUSCULAR; INTRAVENOUS at 09:21

## 2019-03-11 RX ADMIN — SODIUM CHLORIDE: 9 INJECTION, SOLUTION INTRAVENOUS at 07:47

## 2019-03-11 RX ADMIN — LIDOCAINE HYDROCHLORIDE 4 ML: 20 INJECTION, SOLUTION INFILTRATION; PERINEURAL at 09:04

## 2019-03-11 RX ADMIN — FENTANYL CITRATE 25 MCG: 50 INJECTION, SOLUTION INTRAMUSCULAR; INTRAVENOUS at 10:36

## 2019-03-11 RX ADMIN — FENTANYL CITRATE 25 MCG: 50 INJECTION INTRAMUSCULAR; INTRAVENOUS at 09:04

## 2019-03-11 RX ADMIN — ONDANSETRON 4 MG: 2 INJECTION INTRAMUSCULAR; INTRAVENOUS at 09:43

## 2019-03-11 RX ADMIN — DIPHENHYDRAMINE HCL 25 MG: 25 TABLET ORAL at 12:16

## 2019-03-11 RX ADMIN — EPHEDRINE SULFATE 10 MG: 50 INJECTION INTRAVENOUS at 09:13

## 2019-03-11 RX ADMIN — DEXAMETHASONE SODIUM PHOSPHATE 6 MG: 4 INJECTION, SOLUTION INTRAMUSCULAR; INTRAVENOUS at 09:10

## 2019-03-11 RX ADMIN — GLYCOPYRROLATE 0.1 MG: 0.2 INJECTION, SOLUTION INTRAMUSCULAR; INTRAVENOUS at 08:58

## 2019-03-11 RX ADMIN — GLYCOPYRROLATE 0.2 MG: 0.2 INJECTION, SOLUTION INTRAMUSCULAR; INTRAVENOUS at 09:10

## 2019-03-11 RX ADMIN — PROPOFOL 25 MG: 10 INJECTION, EMULSION INTRAVENOUS at 09:21

## 2019-03-11 ASSESSMENT — PAIN DESCRIPTION - LOCATION
LOCATION: FOOT;TOE (COMMENT WHICH ONE)
LOCATION: TOE (COMMENT WHICH ONE)
LOCATION: TOE (COMMENT WHICH ONE)

## 2019-03-11 ASSESSMENT — LIFESTYLE VARIABLES: SMOKING_STATUS: 0

## 2019-03-11 ASSESSMENT — PULMONARY FUNCTION TESTS
PIF_VALUE: 14
PIF_VALUE: 8
PIF_VALUE: 14
PIF_VALUE: 9
PIF_VALUE: 7
PIF_VALUE: 14
PIF_VALUE: 14
PIF_VALUE: 8
PIF_VALUE: 14
PIF_VALUE: 14
PIF_VALUE: 8
PIF_VALUE: 14
PIF_VALUE: 8
PIF_VALUE: 13
PIF_VALUE: 14
PIF_VALUE: 14
PIF_VALUE: 12
PIF_VALUE: 12
PIF_VALUE: 13
PIF_VALUE: 14
PIF_VALUE: 7
PIF_VALUE: 7
PIF_VALUE: 14
PIF_VALUE: 14
PIF_VALUE: 4
PIF_VALUE: 13
PIF_VALUE: 14
PIF_VALUE: 4
PIF_VALUE: 14
PIF_VALUE: 1
PIF_VALUE: 6
PIF_VALUE: 14
PIF_VALUE: 13
PIF_VALUE: 1
PIF_VALUE: 1
PIF_VALUE: 8
PIF_VALUE: 14
PIF_VALUE: 7
PIF_VALUE: 7
PIF_VALUE: 8
PIF_VALUE: 14
PIF_VALUE: 14
PIF_VALUE: 8
PIF_VALUE: 12
PIF_VALUE: 13
PIF_VALUE: 14
PIF_VALUE: 12
PIF_VALUE: 14
PIF_VALUE: 14
PIF_VALUE: 1
PIF_VALUE: 13
PIF_VALUE: 24
PIF_VALUE: 13
PIF_VALUE: 4
PIF_VALUE: 1
PIF_VALUE: 14
PIF_VALUE: 1
PIF_VALUE: 1
PIF_VALUE: 14

## 2019-03-11 ASSESSMENT — PAIN SCALES - GENERAL
PAINLEVEL_OUTOF10: 4
PAINLEVEL_OUTOF10: 2
PAINLEVEL_OUTOF10: 5
PAINLEVEL_OUTOF10: 6
PAINLEVEL_OUTOF10: 5
PAINLEVEL_OUTOF10: 5

## 2019-03-11 ASSESSMENT — PAIN DESCRIPTION - ORIENTATION
ORIENTATION: LEFT
ORIENTATION: LEFT

## 2019-03-11 ASSESSMENT — ENCOUNTER SYMPTOMS: SHORTNESS OF BREATH: 0

## 2019-03-11 ASSESSMENT — PAIN DESCRIPTION - PROGRESSION
CLINICAL_PROGRESSION: NOT CHANGED
CLINICAL_PROGRESSION: GRADUALLY IMPROVING

## 2019-03-11 ASSESSMENT — PAIN DESCRIPTION - PAIN TYPE
TYPE: ACUTE PAIN;SURGICAL PAIN
TYPE: ACUTE PAIN;SURGICAL PAIN

## 2019-03-11 ASSESSMENT — PAIN DESCRIPTION - FREQUENCY: FREQUENCY: CONTINUOUS

## 2019-03-11 ASSESSMENT — PAIN DESCRIPTION - DESCRIPTORS
DESCRIPTORS: ACHING;DISCOMFORT
DESCRIPTORS: DISCOMFORT

## 2019-03-11 ASSESSMENT — PAIN - FUNCTIONAL ASSESSMENT: PAIN_FUNCTIONAL_ASSESSMENT: 0-10

## 2019-03-27 ENCOUNTER — OFFICE VISIT (OUTPATIENT)
Dept: ORTHOPEDIC SURGERY | Age: 84
End: 2019-03-27

## 2019-03-27 VITALS — HEIGHT: 60 IN | BODY MASS INDEX: 32.03 KG/M2

## 2019-03-27 DIAGNOSIS — M20.5X2 CROSSOVER TOE DEFORMITY OF LEFT FOOT: Primary | ICD-10-CM

## 2019-03-27 DIAGNOSIS — M21.612 BUNION, LEFT FOOT: ICD-10-CM

## 2019-03-27 PROCEDURE — 99024 POSTOP FOLLOW-UP VISIT: CPT | Performed by: NURSE PRACTITIONER

## 2019-03-27 PROCEDURE — APPNB15 APP NON BILLABLE TIME 0-15 MINS: Performed by: NURSE PRACTITIONER

## 2019-03-28 ENCOUNTER — TELEPHONE (OUTPATIENT)
Dept: ORTHOPEDIC SURGERY | Age: 84
End: 2019-03-28

## 2019-04-15 ENCOUNTER — TELEPHONE (OUTPATIENT)
Dept: ORTHOPEDIC SURGERY | Age: 84
End: 2019-04-15

## 2019-04-15 NOTE — TELEPHONE ENCOUNTER
Patient daughter Shaka Lozanos called states that patient has cellulitis at the point of surgery. Shaka Ceja is calling to see if there is something Dr. Aimee Llamas would recommend for patient care? She states that patient is taking an antibiotic but wants to know if there is additional care that would be recommended?     Please call Shaka Ceja to advise:  [phone]

## 2019-04-19 ENCOUNTER — OFFICE VISIT (OUTPATIENT)
Dept: ORTHOPEDIC SURGERY | Age: 84
End: 2019-04-19

## 2019-04-19 VITALS — HEIGHT: 60 IN | RESPIRATION RATE: 16 BRPM | WEIGHT: 164 LBS | BODY MASS INDEX: 32.2 KG/M2

## 2019-04-19 DIAGNOSIS — M20.5X2 CROSSOVER TOE DEFORMITY OF LEFT FOOT: ICD-10-CM

## 2019-04-19 DIAGNOSIS — M21.612 BUNION, LEFT FOOT: Primary | ICD-10-CM

## 2019-04-19 PROCEDURE — 99024 POSTOP FOLLOW-UP VISIT: CPT | Performed by: ORTHOPAEDIC SURGERY

## 2019-04-19 NOTE — PROGRESS NOTES
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 HISTORY OF PRESENT ILLNESS:    Ms. Harrington Eleuterio 80 y.o. female here for an early postop visit. This appointment was made by the rehabilitation center as he think she has possible cellulitis left foot. They noticed increase redness and swelling over the past week and started her on doxycycline. She has been partial weightbearing on the heel in a post-op shoe. She denies any sharp pain. Rates pain a 0/10 VAS and has otherwise been doing well. No fever or chills. No numbness or tingling sensation. PHYSICAL EXAMINATION:    The incision is healing nicely. There is a dried scab medial first toe. Moderate dependent erythema, that completely resolves with elevation. No drainage, with mild swelling. No pain with big toe ROM. She has intact sensation in the left foot. IMAGING:  X-rays were taken in the office today, 3 views of the left foot, and showed the correction and better alignment in the great toe bunion with arthritis in the MPJ great toe. The left foot second toe amputation at the metatarsophalangeal joint. 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 Dry dressing as needed. She was instructed to elevate her leg as much as possible to help with the swelling. She can be weightbearing as tolerated in the postop shoe. She will come back in 4 weeks. At that time, we will get new xray.     Micha Dutton MD

## 2019-05-08 ENCOUNTER — OFFICE VISIT (OUTPATIENT)
Dept: ORTHOPEDIC SURGERY | Age: 84
End: 2019-05-08
Payer: COMMERCIAL

## 2019-05-08 VITALS — HEART RATE: 60 BPM | WEIGHT: 164 LBS | HEIGHT: 60 IN | RESPIRATION RATE: 16 BRPM | BODY MASS INDEX: 32.2 KG/M2

## 2019-05-08 DIAGNOSIS — M21.612 BUNION, LEFT FOOT: Primary | ICD-10-CM

## 2019-05-08 DIAGNOSIS — M79.672 FOOT PAIN, LEFT: ICD-10-CM

## 2019-05-08 DIAGNOSIS — M20.5X2 CROSSOVER TOE DEFORMITY OF LEFT FOOT: ICD-10-CM

## 2019-05-08 PROCEDURE — 99212 OFFICE O/P EST SF 10 MIN: CPT | Performed by: ORTHOPAEDIC SURGERY

## 2019-05-08 NOTE — PROGRESS NOTES
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 HISTORY OF PRESENT ILLNESS:    Ms. Estefany Caputo 80 y.o. female here for a postop visit. The rehabilitation center restarted her on doxycycline yesterday for cellulitis left foot. They noticed increase redness and swelling over the past week. She had mild serous drainage coming from the incision over her great toe and then putting a dry dressing over it. She has been partial weightbearing on the heel in a post-op shoe. She denies any sharp pain. Rates pain a 0/10 VAS and has otherwise been doing well. No fever or chills. No numbness or tingling sensation. Past Medical History:   Diagnosis Date    Allergic rhinitis     Anxiety, generalized     Arthritis     Chronic dermatitis of hands     Dementia     Depression     Full dentures     Hypertension, essential, benign     Impetigo     Intracervical pessary     Lumbar stenosis     Plantar fasciitis     Right-sided low back pain without sciatica 11/17/2016    Thrush     Tobacco use disorder     Uterus prolapse        Past Surgical History:   Procedure Laterality Date    BUNIONECTOMY Left 03/11/2019    LEFT FOOT BUNION CORRECTION WITH SOFT TISSUE RELEASE MEDIAL EMINENCE AND SECOND TOE AMPUTATION WITH MINI C-ARM (Left Foot)    BUNIONECTOMY Left 3/11/2019    LEFT FOOT BUNION CORRECTION WITH SOFT TISSUE RELEASE MEDIAL EMINENCE AND SECOND TOE AMPUTATION performed by Damion Chavira MD at [address] [place] [address] History     Socioeconomic History    Marital status:       Spouse name: Not on file    Number of children: Not on file    Years of education: Not on file    Highest education level: Not on file   Occupational History    Not on file   Social Needs    Financial resource strain: Not on file   [place] insecurity:     Worry: Not on file     Inability: Not on file    Transportation needs:     Medical: Not on file     Non-medical: Not on file   Tobacco Use    Smoking status: Former Smoker     Packs/day: 2.00     Years: 55.00     Pack years: 110.00     Last attempt to quit: 3/8/2017     Years since quittin.1    Smokeless tobacco: Never Used   Substance and Sexual Activity    Alcohol use: No    Drug use: No    Sexual activity: Not on file   Lifestyle    Physical activity:     Days per week: Not on file     Minutes per session: Not on file    Stress: Not on file   Relationships    Social connections:     Talks on phone: Not on file     Gets together: Not on file     Attends Jewish service: Not on file     Active member of club or organization: Not on file     Attends meetings of clubs or organizations: Not on file     Relationship status: Not on file    Intimate partner violence:     Fear of current or ex partner: Not on file     Emotionally abused: Not on file     Physically abused: Not on file     Forced sexual activity: Not on file   Other Topics Concern    Not on file   Social History Narrative    Not on file       Family History   Adopted: Yes   Problem Relation Age of Onset    Heart Disease Father        Current Outpatient Medications on File Prior to Visit   Medication Sig Dispense Refill    Omeprazole 20 MG TBDD omeprazole 20 mg capsule,delayed release daily      furosemide (LASIX) 20 MG tablet furosemide 20 mg tablet      melatonin 3 MG TABS tablet Take 3 mg by mouth      escitalopram (LEXAPRO) 20 MG tablet Take 20 mg by mouth daily      Cholecalciferol (VITAMIN D3) 22434 units CAPS Take 1 capsule by mouth once a week       risperiDONE (RISPERDAL) 0.5 MG tablet Take 0.5 mg by mouth daily Takes in afternoon      acetaminophen (TYLENOL) 325 MG tablet Take 650 mg by mouth every 8-12 hours as needed for Pain      Phenol (CHLORASEPTIC MT) Take by mouth every 4 hours as needed 2 sprays      acetaminophen (TYLENOL) 325 MG tablet Take 650 mg by mouth every 12 hours For breakthrough pain      Oxymetazoline HCl (AFRIN NASAL SPRAY NA) 2 sprays by Nasal route every 12 hours as needed      glycerin-hypromellose- (ARTIFICIAL TEARS) 0.2-0.2-1 % SOLN opthalmic solution Place 1 drop into both eyes as needed      trolamine salicylate (ASPER-FLEX) 10 % cream Apply topically every 6 hours as needed for Pain Apply topically as needed.  Benzocaine-Pectin (CEPACOL SORE THROAT + COATING MT) Take 1 lozenge by mouth every 2 hours as needed      metoprolol tartrate (LOPRESSOR) 25 MG tablet Take 25 mg by mouth 2 times daily      dicyclomine (BENTYL) 10 MG capsule Take 10 mg by mouth 4 times daily (before meals and nightly)      ondansetron (ZOFRAN-ODT) 4 MG disintegrating tablet Take 4 mg by mouth 3 times daily as needed nausea      amLODIPine (NORVASC) 5 MG tablet Take 1 tablet by mouth daily 90 tablet 1    bisacodyl (DUCODYL) 5 MG EC tablet Take 1 tablet by mouth daily as needed for Constipation 30 tablet 0     No current facility-administered medications on file prior to visit. Pertinent items are noted in HPI  Review of systems reviewed from Patient History Form dated on 5/8/2019 and available in the patient's chart under the Media tab. No change noted. PHYSICAL EXAMINATION:  Ms. Sandoval Stover is a very pleasant 80 y.o.  female who presents today in no acute distress, awake, alert, and oriented. She is well dressed, nourished and  groomed. Patient with normal affect. Height is  5' (1.524 m), weight is 164 lb (74.4 kg), Body mass index is 32.03 kg/m². Resting respiratory rate is 16. She ambulates with a slow gait. The incision is scabbed and draining serous fluid. There is a dried scab medial first toe. Peeling skin. Moderate dependent erythema, that improves with elevation. Moderate swelling. No pain with big toe ROM. She has intact sensation in the left foot. Ankle reflex 1+ bilaterally. Good strength, and no instability both upper and lower extremities. IMAGING:  X-rays were taken in the office today, 3 views of the left foot, and showed the correction and better alignment in the great toe bunion with arthritis in the MPJ great toe. The left foot second toe amputation at the metatarsophalangeal joint. 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 Dry dressing daily and as needed. She was instructed to elevate her leg as much as possible to help with the swelling. She can be weightbearing as tolerated in wide toe box shoes. She will come back in 4 weeks. At that time, we will get new xray.     Laura Velasco MD

## 2019-05-14 ENCOUNTER — TELEPHONE (OUTPATIENT)
Dept: ORTHOPEDIC SURGERY | Age: 84
End: 2019-05-14

## 2019-05-29 ENCOUNTER — OFFICE VISIT (OUTPATIENT)
Dept: ORTHOPEDIC SURGERY | Age: 84
End: 2019-05-29

## 2019-05-29 VITALS — HEIGHT: 60 IN | WEIGHT: 164 LBS | BODY MASS INDEX: 32.2 KG/M2

## 2019-05-29 DIAGNOSIS — M20.5X2 CROSSOVER TOE DEFORMITY OF LEFT FOOT: Primary | ICD-10-CM

## 2019-05-29 PROCEDURE — 99024 POSTOP FOLLOW-UP VISIT: CPT | Performed by: ORTHOPAEDIC SURGERY

## 2019-05-29 NOTE — PROGRESS NOTES
Inability: Not on file    Transportation needs:     Medical: Not on file     Non-medical: Not on file   Tobacco Use    Smoking status: Former Smoker     Packs/day: 2.00     Years: 55.00     Pack years: 110.00     Last attempt to quit: 3/8/2017     Years since quittin.2    Smokeless tobacco: Never Used   Substance and Sexual Activity    Alcohol use: No    Drug use: No    Sexual activity: Not on file   Lifestyle    Physical activity:     Days per week: Not on file     Minutes per session: Not on file    Stress: Not on file   Relationships    Social connections:     Talks on phone: Not on file     Gets together: Not on file     Attends Anglican service: Not on file     Active member of club or organization: Not on file     Attends meetings of clubs or organizations: Not on file     Relationship status: Not on file    Intimate partner violence:     Fear of current or ex partner: Not on file     Emotionally abused: Not on file     Physically abused: Not on file     Forced sexual activity: Not on file   Other Topics Concern    Not on file   Social History Narrative    Not on file       Family History   Adopted: Yes   Problem Relation Age of Onset    Heart Disease Father        Current Outpatient Medications on File Prior to Visit   Medication Sig Dispense Refill    Omeprazole 20 MG TBDD omeprazole 20 mg capsule,delayed release daily      furosemide (LASIX) 20 MG tablet furosemide 20 mg tablet      melatonin 3 MG TABS tablet Take 3 mg by mouth      escitalopram (LEXAPRO) 20 MG tablet Take 20 mg by mouth daily      Cholecalciferol (VITAMIN D3) 03361 units CAPS Take 1 capsule by mouth once a week       risperiDONE (RISPERDAL) 0.5 MG tablet Take 0.5 mg by mouth daily Takes in afternoon      acetaminophen (TYLENOL) 325 MG tablet Take 650 mg by mouth every 8-12 hours as needed for Pain      Phenol (CHLORASEPTIC MT) Take by mouth every 4 hours as needed 2 sprays      acetaminophen (TYLENOL) 325 MG tablet Take 650 mg by mouth every 12 hours For breakthrough pain      Oxymetazoline HCl (AFRIN NASAL SPRAY NA) 2 sprays by Nasal route every 12 hours as needed      glycerin-hypromellose- (ARTIFICIAL TEARS) 0.2-0.2-1 % SOLN opthalmic solution Place 1 drop into both eyes as needed      trolamine salicylate (ASPER-FLEX) 10 % cream Apply topically every 6 hours as needed for Pain Apply topically as needed.  Benzocaine-Pectin (CEPACOL SORE THROAT + COATING MT) Take 1 lozenge by mouth every 2 hours as needed      metoprolol tartrate (LOPRESSOR) 25 MG tablet Take 25 mg by mouth 2 times daily      dicyclomine (BENTYL) 10 MG capsule Take 10 mg by mouth 4 times daily (before meals and nightly)      ondansetron (ZOFRAN-ODT) 4 MG disintegrating tablet Take 4 mg by mouth 3 times daily as needed nausea      amLODIPine (NORVASC) 5 MG tablet Take 1 tablet by mouth daily 90 tablet 1    bisacodyl (DUCODYL) 5 MG EC tablet Take 1 tablet by mouth daily as needed for Constipation 30 tablet 0     No current facility-administered medications on file prior to visit. Pertinent items are noted in HPI  Review of systems reviewed from Patient History Form dated on 5/8/2019 and available in the patient's chart under the Media tab. No change noted. PHYSICAL EXAMINATION:  Ms. Rafia Sky is a very pleasant 80 y.o.  female who presents today in no acute distress, awake, alert, and oriented. She is well dressed, nourished and  groomed. Patient with normal affect. Height is  5' (1.524 m), weight is 164 lb (74.4 kg), Body mass index is 32.03 kg/m². Resting respiratory rate is 16. She ambulates with a slow gait using assistance. The incision is scabbed, no drainage. No erythema. No warmth. There is a dried scab medial first toe. Peeling skin. Mild swelling. No pain with big toe ROM. She has intact sensation in the left foot. Ankle reflex 1+ bilaterally.  Good strength, and no instability both upper and lower extremities. IMAGING:  X-rays were taken in the office today, 3 views of the left foot, and showed the correction and better alignment in the great toe bunion with arthritis in the MPJ great toe. The left foot second toe amputation at the metatarsophalangeal joint. 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 Keep bunion padded. She was instructed to elevate her leg as much as possible to help with the swelling. She can be weightbearing as tolerated in wide toe box shoes. She will come back in 6 weeks. At that time, we will get new xray.     Sigrid Perla MD

## 2020-02-19 ENCOUNTER — OFFICE VISIT (OUTPATIENT)
Dept: ORTHOPEDIC SURGERY | Age: 85
End: 2020-02-19
Payer: COMMERCIAL

## 2020-02-19 VITALS
BODY MASS INDEX: 32.2 KG/M2 | WEIGHT: 164 LBS | HEART RATE: 67 BPM | DIASTOLIC BLOOD PRESSURE: 61 MMHG | SYSTOLIC BLOOD PRESSURE: 127 MMHG | HEIGHT: 60 IN

## 2020-02-19 PROCEDURE — 29550 STRAPPING OF TOES: CPT | Performed by: ORTHOPAEDIC SURGERY

## 2020-02-19 PROCEDURE — MISC245 PEDIFLEX GEL BUNION SPLINT: Performed by: ORTHOPAEDIC SURGERY

## 2020-02-19 PROCEDURE — 99214 OFFICE O/P EST MOD 30 MIN: CPT | Performed by: ORTHOPAEDIC SURGERY

## 2020-03-25 PROBLEM — E87.1 HYPONATREMIA: Status: RESOLVED | Noted: 2017-05-14 | Resolved: 2020-03-24

## 2020-12-22 ENCOUNTER — HOSPITAL ENCOUNTER (EMERGENCY)
Age: 85
Discharge: HOME | End: 2020-12-23
Attending: EMERGENCY MEDICINE
Payer: COMMERCIAL

## 2020-12-22 VITALS
RESPIRATION RATE: 16 BRPM | BODY MASS INDEX: 32.46 KG/M2 | TEMPERATURE: 97.9 F | HEART RATE: 61 BPM | WEIGHT: 176.37 LBS | OXYGEN SATURATION: 96 % | DIASTOLIC BLOOD PRESSURE: 64 MMHG | HEIGHT: 62 IN | SYSTOLIC BLOOD PRESSURE: 129 MMHG

## 2020-12-22 LAB
BACTERIA: ABNORMAL /HPF
BILIRUBIN URINE: NEGATIVE
BLOOD, URINE: NEGATIVE
CLARITY: ABNORMAL
COLOR: YELLOW
EPITHELIAL CELLS, UA: 8 /HPF (ref 0–5)
GLUCOSE BLD-MCNC: 146 MG/DL
GLUCOSE BLD-MCNC: 146 MG/DL (ref 70–99)
GLUCOSE URINE: NEGATIVE MG/DL
HYALINE CASTS: 1 /LPF (ref 0–8)
KETONES, URINE: NEGATIVE MG/DL
LEUKOCYTE ESTERASE, URINE: ABNORMAL
MICROSCOPIC EXAMINATION: YES
NITRITE, URINE: NEGATIVE
PERFORMED ON: ABNORMAL
PH UA: 5.5 (ref 5–8)
PROTEIN UA: NEGATIVE MG/DL
RBC UA: 1 /HPF (ref 0–4)
SPECIFIC GRAVITY UA: 1.02 (ref 1–1.03)
URINE REFLEX TO CULTURE: ABNORMAL
URINE TYPE: ABNORMAL
UROBILINOGEN, URINE: 0.2 E.U./DL
WBC UA: 8 /HPF (ref 0–5)

## 2020-12-22 PROCEDURE — 6370000000 HC RX 637 (ALT 250 FOR IP): Performed by: EMERGENCY MEDICINE

## 2020-12-22 PROCEDURE — 99284 EMERGENCY DEPT VISIT MOD MDM: CPT

## 2020-12-22 PROCEDURE — 81001 URINALYSIS AUTO W/SCOPE: CPT

## 2020-12-22 RX ORDER — LANOLIN ALCOHOL/MO/W.PET/CERES
325 CREAM (GRAM) TOPICAL
COMMUNITY

## 2020-12-22 RX ORDER — QUETIAPINE 150 MG/1
37.5 TABLET, FILM COATED, EXTENDED RELEASE ORAL
COMMUNITY

## 2020-12-22 RX ORDER — CEFUROXIME AXETIL 250 MG/1
250 TABLET ORAL 2 TIMES DAILY
Qty: 14 TABLET | Refills: 0 | Status: SHIPPED | OUTPATIENT
Start: 2020-12-22 | End: 2020-12-29

## 2020-12-22 RX ORDER — CEFUROXIME AXETIL 250 MG/1
500 TABLET ORAL ONCE
Status: COMPLETED | OUTPATIENT
Start: 2020-12-22 | End: 2020-12-22

## 2020-12-22 RX ADMIN — CEFUROXIME AXETIL 500 MG: 250 TABLET ORAL at 22:30

## 2020-12-23 ASSESSMENT — ENCOUNTER SYMPTOMS
CHEST TIGHTNESS: 0
EYE ITCHING: 0
CHOKING: 0
ABDOMINAL DISTENTION: 0
EYE DISCHARGE: 0
COUGH: 0
EYE PAIN: 0
APNEA: 0
STRIDOR: 0
WHEEZING: 0
PHOTOPHOBIA: 0
SHORTNESS OF BREATH: 0
EYE REDNESS: 0

## 2020-12-23 NOTE — ED TRIAGE NOTES
Per ems the patient was \"acting out\" at the nursing home. The facility requested the pt to give a urine sample and per facility, the pt created and scene and was acting \"unlike herself\". So they called ems for potential UTI. Pt denies pain, n/v/d, fevers.

## 2020-12-23 NOTE — ED PROVIDER NOTES
[facility]      Pt Name: Romi Gloria  MRN: [id number]  [place] 1935  Date of evaluation: 12/22/2020  Provider: Susan Perkins MD    CHIEF COMPLAINT       Chief Complaint   Patient presents with    Medical assessment        HISTORY OF PRESENT ILLNESS    Romi Gloria is a 80 y.o. female who presents to the emergency department with medical assessment. Sent in for yelling at the nurses at [facility]. Patient is not altered and has no complaints. Does have uterine prolapse that she says is chronic but requesting I push it back in. Has no pain. NH wanted a urine to look for UTI. Nursing Notes were reviewed. Including nursing noted for FM, Surgical History, Past Medical History, Social History, vitals, and allergies; agree with all. REVIEW OF SYSTEMS       Review of Systems   Constitutional: Negative for activity change, appetite change, chills, diaphoresis, fatigue, fever and unexpected weight change. HENT: Negative for congestion, dental problem, drooling and ear discharge. Eyes: Negative for photophobia, pain, discharge, redness and itching. Respiratory: Negative for apnea, cough, choking, chest tightness, shortness of breath, wheezing and stridor. Cardiovascular: Negative for chest pain and leg swelling. Gastrointestinal: Negative for abdominal distention. Endocrine: Negative for polyphagia and polyuria. Genitourinary: Negative for vaginal bleeding, vaginal discharge and vaginal pain. Musculoskeletal: Negative for arthralgias. Neurological: Negative for dizziness, facial asymmetry and headaches. Hematological: Negative for adenopathy. Does not bruise/bleed easily. Psychiatric/Behavioral: Positive for agitation. Negative for behavioral problems, confusion, decreased concentration, dysphoric mood, hallucinations, self-injury, sleep disturbance and suicidal ideas.  The patient is not nervous/anxious and is not hyperactive. Except as noted above the remainder of the review of systems was reviewed and negative.      PAST MEDICAL HISTORY     Past Medical History:   Diagnosis Date    Allergic rhinitis     Anxiety, generalized     Arthritis     Chronic dermatitis of hands     Dementia ([address]     Depression     Full dentures     Hypertension, essential, benign     Impetigo     Intracervical pessary     Lumbar stenosis     Plantar fasciitis     Right-sided low back pain without sciatica 11/17/2016    Thrush     Tobacco use disorder     Uterus prolapse        SURGICAL HISTORY       Past Surgical History:   Procedure Laterality Date    BUNIONECTOMY Left 03/11/2019    LEFT FOOT BUNION CORRECTION WITH SOFT TISSUE RELEASE MEDIAL EMINENCE AND SECOND TOE AMPUTATION WITH MINI C-ARM (Left Foot)    BUNIONECTOMY Left 3/11/2019    LEFT FOOT BUNION CORRECTION WITH SOFT TISSUE RELEASE MEDIAL EMINENCE AND SECOND TOE AMPUTATION performed by Beth Carlton MD at [address]       Previous Medications    ACETAMINOPHEN (TYLENOL) 325 MG TABLET    Take 650 mg by mouth every 8-12 hours as needed for Pain    ACETAMINOPHEN (TYLENOL) 325 MG TABLET    Take 650 mg by mouth every 12 hours For breakthrough pain    AMLODIPINE (NORVASC) 5 MG TABLET    Take 1 tablet by mouth daily    BENZOCAINE-PECTIN (CEPACOL SORE THROAT + COATING MT)    Take 1 lozenge by mouth every 2 hours as needed    BISACODYL (DUCODYL) 5 MG EC TABLET    Take 1 tablet by mouth daily as needed for Constipation    CHOLECALCIFEROL (VITAMIN D3) 02158 UNITS CAPS    Take 1 capsule by mouth once a week fridays    DICYCLOMINE (BENTYL) 10 MG CAPSULE    Take 10 mg by mouth 4 times daily (before meals and nightly)    ESCITALOPRAM (LEXAPRO) 20 MG TABLET    Take 20 mg by mouth daily    FERROUS SULFATE (FE TABS 325) 325 (65 FE) MG EC TABLET    Take 325 mg by mouth 3 times daily (with meals)    FUROSEMIDE (LASIX) 20 MG TABLET    furosemide 20 mg tablet GLYCERIN-HYPROMELLOSE- (ARTIFICIAL TEARS) 0.2-0.2-1 % SOLN OPTHALMIC SOLUTION    Place 1 drop into both eyes as needed    MELATONIN 3 MG TABS TABLET    Take 3 mg by mouth    METOPROLOL TARTRATE (LOPRESSOR) 25 MG TABLET    Take 25 mg by mouth 2 times daily    OMEPRAZOLE 20 MG TBDD    omeprazole 20 mg capsule,delayed release daily    ONDANSETRON (ZOFRAN-ODT) 4 MG DISINTEGRATING TABLET    Take 4 mg by mouth 3 times daily as needed nausea    OXYMETAZOLINE HCL (AFRIN NASAL SPRAY NA)    2 sprays by Nasal route every 12 hours as needed    PHENOL (CHLORASEPTIC MT)    Take by mouth every 4 hours as needed 2 sprays    QUETIAPINE (SEROQUEL XR) 150 MG TB24 EXTENDED RELEASE TABLET    Take 37.5 mg by mouth three times a week    RISPERIDONE (RISPERDAL) 0.5 MG TABLET    Take 0.5 mg by mouth daily Takes in afternoon    TROLAMINE SALICYLATE (ASPER-FLEX) 10 % CREAM    Apply topically every 6 hours as needed for Pain Apply topically as needed. ALLERGIES     Latex and Strawberry extract    FAMILY HISTORY        Family History   Adopted: Yes   Problem Relation Age of Onset    Heart Disease Father        SOCIAL HISTORY       Social History     Socioeconomic History    Marital status:       Spouse name: Not on file    Number of children: Not on file    Years of education: Not on file    Highest education level: Not on file   Occupational History    Not on file   Social Needs    Financial resource strain: Not on file    Food insecurity     Worry: Not on file     Inability: Not on file    Transportation needs     Medical: Not on file     Non-medical: Not on file   Tobacco Use    Smoking status: Former Smoker     Packs/day: 2.00     Years: 55.00     Pack years: 110.00     Quit date: 3/8/2017     Years since quitting: 3.7    Smokeless tobacco: Never Used   Substance and Sexual Activity    Alcohol use: No    Drug use: No    Sexual activity: Not on file   Lifestyle    Physical activity     Days per week: Not on file     Minutes per session: Not on file    Stress: Not on file   Relationships    Social connections     Talks on phone: Not on file     Gets together: Not on file     Attends Yazidism service: Not on file     Active member of club or organization: Not on file     Attends meetings of clubs or organizations: Not on file     Relationship status: Not on file    Intimate partner violence     Fear of current or ex partner: Not on file     Emotionally abused: Not on file     Physically abused: Not on file     Forced sexual activity: Not on file   Other Topics Concern    Not on file   Social History Narrative    Not on file       PHYSICAL EXAM       ED Triage Vitals [12/22/20 2124]   BP Temp Temp Source Pulse Resp SpO2 Height Weight   129/64 97.9 °F (36.6 °C) Oral 55 12 96 % 5' 2\" (1.575 m) 176 lb 5.9 oz (80 kg)       Physical Exam  Vitals signs and nursing note reviewed. Constitutional:       General: She is not in acute distress. Appearance: She is well-developed. She is not ill-appearing, toxic-appearing or diaphoretic. HENT:      Head: Normocephalic and atraumatic. Right Ear: External ear normal.      Left Ear: External ear normal.   Eyes:      General:         Right eye: No discharge. Left eye: No discharge. Conjunctiva/sclera: Conjunctivae normal.      Pupils: Pupils are equal, round, and reactive to light. Neck:      Musculoskeletal: Normal range of motion and neck supple. Cardiovascular:      Rate and Rhythm: Normal rate and regular rhythm. Heart sounds: No murmur. Pulmonary:      Effort: Pulmonary effort is normal. No respiratory distress. Breath sounds: Normal breath sounds. No wheezing or rales. Abdominal:      General: Bowel sounds are normal. There is no distension. Palpations: Abdomen is soft. There is no mass. Tenderness: There is no abdominal tenderness. There is no guarding or rebound.    Genitourinary:     Comments: Mild prolapse with easy

## 2020-12-23 NOTE — ED NOTES
Dr. Liam Carnes was able to reduce the pts prolapsed uterus which the nurse from her LTAC called and informed me of once I called her to give report on the pt. Informed the nurse from [place] [address] of pts small UTI and sending pt back with antibiotic therapy.       Tanvir Dorman RN  12/22/20 [id number]

## 2021-02-19 ENCOUNTER — TELEPHONE (OUTPATIENT)
Dept: ORTHOPEDIC SURGERY | Age: 86
End: 2021-02-19

## 2021-02-23 ENCOUNTER — TELEPHONE (OUTPATIENT)
Dept: ORTHOPEDIC SURGERY | Age: 86
End: 2021-02-23

## 2021-02-23 DIAGNOSIS — M21.612 BUNION, LEFT FOOT: ICD-10-CM

## 2021-02-23 DIAGNOSIS — M20.5X2 CROSSOVER TOE DEFORMITY OF LEFT FOOT: Primary | ICD-10-CM

## 2021-02-23 NOTE — TELEPHONE ENCOUNTER
Jill winters from [facility] called and wanted to know if this appt was scheduled awhile ago. They didn't have any idea about this appt. is for. Did they make the appt or if we knew why it was schedule. They might be r/s appt.

## 2021-02-23 NOTE — TELEPHONE ENCOUNTER
TEE for Jill letting her know that I returned her call. Gabriele Marvin can be reached at [phone] ext. [phone]    Spoke with patient's daughter and explained to her that the appt was scheduled on behalf of the nursing home because they asked for updated orders from [facility] but hasn't been seen in the office for over a year. She was informed that we are currently trying to connect with someone at the nursing home to find out why appt was scheduled and then canceled. [facility] is claiming that they did not schedule this appt nor do they know anything about it. Per Chayito's last note appt was made through [place] at [facility].

## 2021-02-23 NOTE — TELEPHONE ENCOUNTER
General Question     Subject: APPT SCHD TOMORROW   Patient and /or Facility Request: Daughter has questions/concerns about her mom appt being schd and wasn't aware of the appt.   Contact Number: Andres Figueroa [phone]

## 2021-02-23 NOTE — TELEPHONE ENCOUNTER
General Question     Subject: RETURNING PHONE CALL  Patient and /or Facility Request: Marline Jose  Contact Number: [phone]

## 2021-02-24 ENCOUNTER — TELEPHONE (OUTPATIENT)
Dept: ORTHOPEDIC SURGERY | Age: 86
End: 2021-02-24

## 2021-02-24 NOTE — TELEPHONE ENCOUNTER
Spoke with Elan Guajardo and let her know that the orders were faxed and a success receipt was received.

## 2021-02-24 NOTE — TELEPHONE ENCOUNTER
Medical Facility Question     Facility Name: [facility] Name: Carlo Holman Number: [phone] ext [phone]  Request or Information: Req xr order to be sent to [phone] or [phone]

## 2021-03-01 ENCOUNTER — TELEPHONE (OUTPATIENT)
Dept: ORTHOPEDIC SURGERY | Age: 86
End: 2021-03-01

## 2021-03-01 NOTE — TELEPHONE ENCOUNTER
Called and LVM for Charisma Bagley letting her know I received message. VV is ok to go for 3/3    Called [employer] to release image acces to .

## 2021-03-03 ENCOUNTER — OFFICE VISIT (OUTPATIENT)
Dept: ORTHOPEDIC SURGERY | Age: 86
End: 2021-03-03
Payer: COMMERCIAL

## 2021-03-03 DIAGNOSIS — M21.612 BUNION, LEFT FOOT: ICD-10-CM

## 2021-03-03 DIAGNOSIS — M20.5X2 CROSSOVER TOE DEFORMITY OF LEFT FOOT: Primary | ICD-10-CM

## 2021-03-03 PROCEDURE — 99213 OFFICE O/P EST LOW 20 MIN: CPT | Performed by: ORTHOPAEDIC SURGERY

## 2021-03-03 NOTE — PROGRESS NOTES
TELEHEALTH EVALUATION -- Audio/Visual (During FSABM-24 public health emergency)    I have explained to Ms. Jess Robert Forget that a Physical Examination is inherently limited by the nature of telemedicine and that this may lead to delayed or inadequate diagnosis. I also explained that she may require a higher level of care if a diagnosis can not be reasonably established with a virtual visit. Ms. rFancheska Frederick has provided her Consent to proceed with a Virtual Visit today. Ms. Francheska Frederick was personally present on the video link with me today. This visit was completed virtually using the [employer] platform. Due to this being a TeleHealth encounter, evaluation of the following organ systems is limited: Vitals/Constitutional/EENT/Resp/CV/GI//MS/Neuro/Skin/Heme-Lymph-Imm. 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 4-Right foot 2-4 fixed hammertoes     DATE OF SURGERY:  3/11/2019. HISTORY OF PRESENT ILLNESS:    Ms. Kali Mcmahon 80 y.o. female seen today via video visit for evaluation of left foot 3rd toe pain and right foot hammertoes She reports that the wound is healing well. She has been weightbearing in regular shoes. She denies any sharp pain. Rates pain a 0/10 VAS and has otherwise been doing well. No fever or chills. No numbness or tingling sensation.      Past Medical History:   Diagnosis Date    Allergic rhinitis     Anxiety, generalized     Arthritis     Chronic dermatitis of hands     Dementia (HCC)     Depression     Full dentures     Hypertension, essential, benign     Impetigo     Intracervical pessary     Lumbar stenosis     Plantar fasciitis     Right-sided low back pain without sciatica 11/17/2016    Thrush     Tobacco use disorder     Uterus Current Outpatient Medications on File Prior to Visit   Medication Sig Dispense Refill    ferrous sulfate (FE TABS 325) 325 (65 Fe) MG EC tablet Take 325 mg by mouth 3 times daily (with meals)      QUEtiapine (SEROQUEL XR) 150 MG TB24 extended release tablet Take 37.5 mg by mouth three times a week      Omeprazole 20 MG TBDD omeprazole 20 mg capsule,delayed release daily      furosemide (LASIX) 20 MG tablet furosemide 20 mg tablet      melatonin 3 MG TABS tablet Take 3 mg by mouth      escitalopram (LEXAPRO) 20 MG tablet Take 20 mg by mouth daily      Cholecalciferol (VITAMIN D3) 54150 units CAPS Take 1 capsule by mouth once a week fridays      risperiDONE (RISPERDAL) 0.5 MG tablet Take 0.5 mg by mouth daily Takes in afternoon      acetaminophen (TYLENOL) 325 MG tablet Take 650 mg by mouth every 8-12 hours as needed for Pain      Phenol (CHLORASEPTIC MT) Take by mouth every 4 hours as needed 2 sprays      acetaminophen (TYLENOL) 325 MG tablet Take 650 mg by mouth every 12 hours For breakthrough pain      Oxymetazoline HCl (AFRIN NASAL SPRAY NA) 2 sprays by Nasal route every 12 hours as needed      glycerin-hypromellose- (ARTIFICIAL TEARS) 0.2-0.2-1 % SOLN opthalmic solution Place 1 drop into both eyes as needed      trolamine salicylate (ASPER-FLEX) 10 % cream Apply topically every 6 hours as needed for Pain Apply topically as needed.       Benzocaine-Pectin (CEPACOL SORE THROAT + COATING MT) Take 1 lozenge by mouth every 2 hours as needed      metoprolol tartrate (LOPRESSOR) 25 MG tablet Take 25 mg by mouth 2 times daily      dicyclomine (BENTYL) 10 MG capsule Take 10 mg by mouth 4 times daily (before meals and nightly)      ondansetron (ZOFRAN-ODT) 4 MG disintegrating tablet Take 4 mg by mouth 3 times daily as needed nausea      amLODIPine (NORVASC) 5 MG tablet Take 1 tablet by mouth daily 90 tablet 1    bisacodyl (DUCODYL) 5 MG EC tablet Take 1 tablet by mouth daily as needed for Constipation 30 tablet 0     No current facility-administered medications on file prior to visit. Pertinent items are noted in HPI  Review of systems reviewed from Patient History Form dated on 5/8/2019 and available in the patient's chart under the Media tab. No change noted. PHYSICAL EXAMINATION:  Ms. Batool Srinivasan is a very pleasant 80 y.o.  female who presents today in no acute distress, awake, alert, and oriented. She is well dressed, nourished and  groomed. Patient with normal affect. Height is   , weight is  , There is no height or weight on file to calculate BMI. Resting respiratory rate is 16. She ambulates with a slow gait using assistance. The incision healed well left foot, no drainage. No erythema. No warmth. No pain with big toe ROM. She has intact sensation in the left foot. Ankle reflex 1+ bilaterally. Good strength, and no instability both upper and lower extremities. IMAGING:  X-rays were taken in [facility], 3 views of the left foot, and showed the correction and better alignment in the great toe bunion with arthritis in the MPJ great toe. The left foot second toe amputation at the metatarsophalangeal joint. 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 4-Right foot 2-4 fixed hammertoes    PLAN:  She was instructed to apply a bunion padded spacer as needed. She was instructed to elevate her leg as much as possible to help with the swelling. She can be weightbearing as tolerated in wide toe box shoes. She can go back to normal activity with no restrictions. She will be seen PRN. I told the patient that it is not unusual to have some achy pain and swelling.     Pursuant to the emergency declaration under the [address], [facility] authority and the Coronavirus Preparedness and [employer] Act, this Virtual  Visit was conducted, with patient's consent, to reduce the patient's risk of exposure to COVID-19 and provide continuity of care for an established patient. Services were provided through a video synchronous discussion virtually to substitute for in-person clinic visit.        Lev Morrell MD

## 2021-04-14 ENCOUNTER — TELEPHONE (OUTPATIENT)
Dept: ORTHOPEDIC SURGERY | Age: 86
End: 2021-04-14

## 2021-04-14 NOTE — TELEPHONE ENCOUNTER
Called and LVM for Marcella Melo on her confidential voicemail.      **last OV note states \"bunion padded spacer as needed\"**

## 2021-04-14 NOTE — TELEPHONE ENCOUNTER
Medical Facility Question     Facility Name: [address] [place] [address] Name: Sampson Moreno Number: [phone]  Request or Information: NEEDING ORDER TO DC BUNION SPACER, PATIENT WILL NOT WEAR IT BUT DAUGHTER INSIST THAT IT WAS ORDERED FOR PATIENT TO WEAR.  [address]ISE [phone]

## 2021-04-14 NOTE — TELEPHONE ENCOUNTER
Called and left detailed message for Jarret Body.  Per OV note, the bunion spacer is as needed so if patient does not need them or doesn't not want them it should be ok to DC

## 2021-09-13 ENCOUNTER — HOSPITAL ENCOUNTER (OUTPATIENT)
Dept: CT IMAGING | Age: 86
Discharge: HOME | End: 2021-09-13
Payer: COMMERCIAL

## 2021-09-13 DIAGNOSIS — N81.89 OLD LACERATION OF MUSCLES OF PELVIC FLOOR: ICD-10-CM

## 2021-09-13 LAB
GFR AFRICAN AMERICAN: 47
GFR NON-AFRICAN AMERICAN: 39
PERFORMED ON: ABNORMAL
POC CREATININE: 1.3 MG/DL (ref 0.6–1.2)
POC SAMPLE TYPE: ABNORMAL

## 2021-09-13 PROCEDURE — 6360000004 HC RX CONTRAST MEDICATION: Performed by: INTERNAL MEDICINE

## 2021-09-13 PROCEDURE — 82565 ASSAY OF CREATININE: CPT

## 2021-09-13 PROCEDURE — 74177 CT ABD & PELVIS W/CONTRAST: CPT

## 2021-09-13 RX ADMIN — IOPAMIDOL 75 ML: 755 INJECTION, SOLUTION INTRAVENOUS at 09:23

## 2021-09-13 RX ADMIN — IOHEXOL 50 ML: 240 INJECTION, SOLUTION INTRATHECAL; INTRAVASCULAR; INTRAVENOUS; ORAL at 09:23

## 2022-01-13 NOTE — ED NOTES
Bed: B-07  Expected date:   Expected time:   Means of arrival:   Comments:  Zoran Perez RN  12/22/20 2124 Current refill request refused due to refill is either a duplicate request or has active refills at the pharmacy. Check previous templates.     Requested Prescriptions     Pending Prescriptions Disp Refills   • venlafaxine 75 MG Oral Capsule SR 24 Hr 90 ca

## 2023-04-18 ENCOUNTER — APPOINTMENT (OUTPATIENT)
Dept: CT IMAGING | Age: 88
End: 2023-04-18
Payer: COMMERCIAL

## 2023-04-18 ENCOUNTER — APPOINTMENT (OUTPATIENT)
Dept: GENERAL RADIOLOGY | Age: 88
End: 2023-04-18
Payer: COMMERCIAL

## 2023-04-18 ENCOUNTER — HOSPITAL ENCOUNTER (INPATIENT)
Age: 88
LOS: 2 days | Discharge: NURSING, REHAB OR LONG-TERM CARE | End: 2023-04-20
Attending: EMERGENCY MEDICINE | Admitting: HOSPITALIST
Payer: COMMERCIAL

## 2023-04-18 DIAGNOSIS — R45.1 AGITATION: ICD-10-CM

## 2023-04-18 DIAGNOSIS — R55 SYNCOPE AND COLLAPSE: Primary | ICD-10-CM

## 2023-04-18 DIAGNOSIS — R07.9 CHEST PAIN, UNSPECIFIED TYPE: ICD-10-CM

## 2023-04-18 DIAGNOSIS — M21.612 BUNION, LEFT FOOT: ICD-10-CM

## 2023-04-18 LAB
ALBUMIN SERPL-MCNC: 3.1 G/DL (ref 3.4–5)
ALBUMIN/GLOB SERPL: 1.3 {RATIO} (ref 1.1–2.2)
ALP SERPL-CCNC: 69 U/L (ref 40–129)
ALT SERPL-CCNC: 18 U/L (ref 10–40)
ANION GAP SERPL CALCULATED.3IONS-SCNC: 9 MMOL/L (ref 3–16)
AST SERPL-CCNC: 19 U/L (ref 15–37)
BASOPHILS # BLD: 0 K/UL (ref 0–0.2)
BASOPHILS NFR BLD: 0.7 %
BILIRUB SERPL-MCNC: <0.2 MG/DL (ref 0–1)
BILIRUB UR QL STRIP.AUTO: NEGATIVE
BUN SERPL-MCNC: 19 MG/DL (ref 7–20)
CALCIUM SERPL-MCNC: 7.9 MG/DL (ref 8.3–10.6)
CHLORIDE SERPL-SCNC: 104 MMOL/L (ref 99–110)
CHP ED QC CHECK: 136
CLARITY UR: CLEAR
CO2 SERPL-SCNC: 26 MMOL/L (ref 21–32)
COLOR UR: YELLOW
CREAT SERPL-MCNC: 1.2 MG/DL (ref 0.6–1.2)
DEPRECATED RDW RBC AUTO: 14.1 % (ref 12.4–15.4)
EKG ATRIAL RATE: 57 BPM
EKG DIAGNOSIS: NORMAL
EKG P AXIS: 102 DEGREES
EKG P-R INTERVAL: 226 MS
EKG Q-T INTERVAL: 420 MS
EKG QRS DURATION: 76 MS
EKG QTC CALCULATION (BAZETT): 408 MS
EKG R AXIS: 30 DEGREES
EKG T AXIS: 62 DEGREES
EKG VENTRICULAR RATE: 57 BPM
EOSINOPHIL # BLD: 0.2 K/UL (ref 0–0.6)
EOSINOPHIL NFR BLD: 4.2 %
GFR SERPLBLD CREATININE-BSD FMLA CKD-EPI: 44 ML/MIN/{1.73_M2}
GLUCOSE BLD-MCNC: 136 MG/DL (ref 70–99)
GLUCOSE SERPL-MCNC: 144 MG/DL (ref 70–99)
GLUCOSE UR STRIP.AUTO-MCNC: NEGATIVE MG/DL
HCT VFR BLD AUTO: 33.3 % (ref 36–48)
HGB BLD-MCNC: 10.9 G/DL (ref 12–16)
HGB UR QL STRIP.AUTO: NEGATIVE
INR PPP: 1.08 (ref 0.84–1.16)
KETONES UR STRIP.AUTO-MCNC: NEGATIVE MG/DL
LEUKOCYTE ESTERASE UR QL STRIP.AUTO: NEGATIVE
LYMPHOCYTES # BLD: 1 K/UL (ref 1–5.1)
LYMPHOCYTES NFR BLD: 23.5 %
MCH RBC QN AUTO: 31.4 PG (ref 26–34)
MCHC RBC AUTO-ENTMCNC: 32.8 G/DL (ref 31–36)
MCV RBC AUTO: 95.7 FL (ref 80–100)
MONOCYTES # BLD: 0.5 K/UL (ref 0–1.3)
MONOCYTES NFR BLD: 10.9 %
NEUTROPHILS # BLD: 2.5 K/UL (ref 1.7–7.7)
NEUTROPHILS NFR BLD: 60.7 %
NITRITE UR QL STRIP.AUTO: NEGATIVE
PERFORMED ON: ABNORMAL
PH UR STRIP.AUTO: 6 [PH] (ref 5–8)
PLATELET # BLD AUTO: 137 K/UL (ref 135–450)
PMV BLD AUTO: 8.8 FL (ref 5–10.5)
POTASSIUM SERPL-SCNC: 4.3 MMOL/L (ref 3.5–5.1)
PROT SERPL-MCNC: 5.4 G/DL (ref 6.4–8.2)
PROT UR STRIP.AUTO-MCNC: NEGATIVE MG/DL
PROTHROMBIN TIME: 14.1 SEC (ref 11.5–14.8)
RBC # BLD AUTO: 3.48 M/UL (ref 4–5.2)
SODIUM SERPL-SCNC: 139 MMOL/L (ref 136–145)
SP GR UR STRIP.AUTO: 1.01 (ref 1–1.03)
TROPONIN, HIGH SENSITIVITY: 54 NG/L (ref 0–14)
TROPONIN, HIGH SENSITIVITY: 69 NG/L (ref 0–14)
UA COMPLETE W REFLEX CULTURE PNL UR: NORMAL
UA DIPSTICK W REFLEX MICRO PNL UR: NORMAL
URN SPEC COLLECT METH UR: NORMAL
UROBILINOGEN UR STRIP-ACNC: 0.2 E.U./DL
WBC # BLD AUTO: 4.2 K/UL (ref 4–11)

## 2023-04-18 PROCEDURE — 70450 CT HEAD/BRAIN W/O DYE: CPT

## 2023-04-18 PROCEDURE — 1200000000 HC SEMI PRIVATE

## 2023-04-18 PROCEDURE — 96374 THER/PROPH/DIAG INJ IV PUSH: CPT

## 2023-04-18 PROCEDURE — 80053 COMPREHEN METABOLIC PANEL: CPT

## 2023-04-18 PROCEDURE — 6360000004 HC RX CONTRAST MEDICATION: Performed by: EMERGENCY MEDICINE

## 2023-04-18 PROCEDURE — 36415 COLL VENOUS BLD VENIPUNCTURE: CPT

## 2023-04-18 PROCEDURE — 99285 EMERGENCY DEPT VISIT HI MDM: CPT

## 2023-04-18 PROCEDURE — 6360000002 HC RX W HCPCS: Performed by: HOSPITALIST

## 2023-04-18 PROCEDURE — 93010 ELECTROCARDIOGRAM REPORT: CPT | Performed by: INTERNAL MEDICINE

## 2023-04-18 PROCEDURE — 70498 CT ANGIOGRAPHY NECK: CPT

## 2023-04-18 PROCEDURE — 84484 ASSAY OF TROPONIN QUANT: CPT

## 2023-04-18 PROCEDURE — 81003 URINALYSIS AUTO W/O SCOPE: CPT

## 2023-04-18 PROCEDURE — 93005 ELECTROCARDIOGRAM TRACING: CPT | Performed by: EMERGENCY MEDICINE

## 2023-04-18 PROCEDURE — 2580000003 HC RX 258: Performed by: HOSPITALIST

## 2023-04-18 PROCEDURE — 71045 X-RAY EXAM CHEST 1 VIEW: CPT

## 2023-04-18 PROCEDURE — 4A03X5D MEASUREMENT OF ARTERIAL FLOW, INTRACRANIAL, EXTERNAL APPROACH: ICD-10-PCS | Performed by: HOSPITALIST

## 2023-04-18 PROCEDURE — 85610 PROTHROMBIN TIME: CPT

## 2023-04-18 PROCEDURE — 6370000000 HC RX 637 (ALT 250 FOR IP): Performed by: HOSPITALIST

## 2023-04-18 PROCEDURE — 6360000002 HC RX W HCPCS: Performed by: EMERGENCY MEDICINE

## 2023-04-18 PROCEDURE — 85025 COMPLETE CBC W/AUTO DIFF WBC: CPT

## 2023-04-18 RX ORDER — OMEPRAZOLE 40 MG/1
40 CAPSULE, DELAYED RELEASE ORAL DAILY
COMMUNITY

## 2023-04-18 RX ORDER — ONDANSETRON 4 MG/1
4 TABLET, ORALLY DISINTEGRATING ORAL EVERY 8 HOURS PRN
Status: DISCONTINUED | OUTPATIENT
Start: 2023-04-18 | End: 2023-04-20 | Stop reason: HOSPADM

## 2023-04-18 RX ORDER — SKIN PROTECTANT 44 G/100G
1 OINTMENT TOPICAL 4 TIMES DAILY PRN
COMMUNITY

## 2023-04-18 RX ORDER — HYDROCORTISONE 25 MG/ML
1 LOTION TOPICAL NIGHTLY
COMMUNITY

## 2023-04-18 RX ORDER — ONDANSETRON 2 MG/ML
4 INJECTION INTRAMUSCULAR; INTRAVENOUS EVERY 6 HOURS PRN
Status: DISCONTINUED | OUTPATIENT
Start: 2023-04-18 | End: 2023-04-20 | Stop reason: HOSPADM

## 2023-04-18 RX ORDER — DICYCLOMINE HYDROCHLORIDE 10 MG/1
10 CAPSULE ORAL
Status: CANCELLED | OUTPATIENT
Start: 2023-04-18

## 2023-04-18 RX ORDER — LORAZEPAM 2 MG/ML
0.5 INJECTION INTRAMUSCULAR EVERY 6 HOURS PRN
Status: DISCONTINUED | OUTPATIENT
Start: 2023-04-18 | End: 2023-04-20 | Stop reason: HOSPADM

## 2023-04-18 RX ORDER — QUETIAPINE 150 MG/1
150 TABLET, FILM COATED, EXTENDED RELEASE ORAL
Status: CANCELLED | OUTPATIENT
Start: 2023-04-19

## 2023-04-18 RX ORDER — BUSPIRONE HYDROCHLORIDE 10 MG/1
10 TABLET ORAL 3 TIMES DAILY
Status: DISCONTINUED | OUTPATIENT
Start: 2023-04-18 | End: 2023-04-20 | Stop reason: HOSPADM

## 2023-04-18 RX ORDER — ACETAMINOPHEN 325 MG/1
650 TABLET ORAL EVERY 4 HOURS PRN
Status: CANCELLED | OUTPATIENT
Start: 2023-04-18

## 2023-04-18 RX ORDER — ESCITALOPRAM OXALATE 10 MG/1
20 TABLET ORAL DAILY
Status: CANCELLED | OUTPATIENT
Start: 2023-04-18

## 2023-04-18 RX ORDER — LORAZEPAM 2 MG/ML
1 INJECTION INTRAMUSCULAR EVERY 6 HOURS PRN
Status: DISCONTINUED | OUTPATIENT
Start: 2023-04-18 | End: 2023-04-20 | Stop reason: HOSPADM

## 2023-04-18 RX ORDER — LORAZEPAM 2 MG/ML
1 INJECTION INTRAMUSCULAR ONCE
Status: COMPLETED | OUTPATIENT
Start: 2023-04-18 | End: 2023-04-18

## 2023-04-18 RX ORDER — AMLODIPINE BESYLATE 5 MG/1
5 TABLET ORAL DAILY
Status: CANCELLED | OUTPATIENT
Start: 2023-04-18

## 2023-04-18 RX ORDER — POTASSIUM CHLORIDE 20 MEQ/1
20 TABLET, EXTENDED RELEASE ORAL 2 TIMES DAILY
COMMUNITY

## 2023-04-18 RX ORDER — FERROUS SULFATE TAB EC 324 MG (65 MG FE EQUIVALENT) 324 (65 FE) MG
325 TABLET DELAYED RESPONSE ORAL
Status: CANCELLED | OUTPATIENT
Start: 2023-04-18

## 2023-04-18 RX ORDER — BUSPIRONE HYDROCHLORIDE 5 MG/1
10 TABLET ORAL 3 TIMES DAILY
COMMUNITY

## 2023-04-18 RX ORDER — POTASSIUM CHLORIDE 7.45 MG/ML
10 INJECTION INTRAVENOUS PRN
Status: DISCONTINUED | OUTPATIENT
Start: 2023-04-18 | End: 2023-04-20 | Stop reason: HOSPADM

## 2023-04-18 RX ORDER — ACETAMINOPHEN 325 MG/1
650 TABLET ORAL EVERY 6 HOURS PRN
Status: DISCONTINUED | OUTPATIENT
Start: 2023-04-18 | End: 2023-04-20 | Stop reason: HOSPADM

## 2023-04-18 RX ORDER — SODIUM CHLORIDE 9 MG/ML
INJECTION, SOLUTION INTRAVENOUS CONTINUOUS
Status: DISCONTINUED | OUTPATIENT
Start: 2023-04-18 | End: 2023-04-20 | Stop reason: HOSPADM

## 2023-04-18 RX ORDER — DIPHENHYDRAMINE HYDROCHLORIDE 50 MG/ML
25 INJECTION INTRAMUSCULAR; INTRAVENOUS ONCE
Status: COMPLETED | OUTPATIENT
Start: 2023-04-18 | End: 2023-04-18

## 2023-04-18 RX ORDER — LOPERAMIDE HYDROCHLORIDE 2 MG/1
2 CAPSULE ORAL 4 TIMES DAILY PRN
COMMUNITY

## 2023-04-18 RX ORDER — ACETAMINOPHEN 650 MG/1
650 SUPPOSITORY RECTAL EVERY 6 HOURS PRN
Status: DISCONTINUED | OUTPATIENT
Start: 2023-04-18 | End: 2023-04-20 | Stop reason: HOSPADM

## 2023-04-18 RX ORDER — RISPERIDONE 1 MG/1
1 TABLET ORAL NIGHTLY
COMMUNITY

## 2023-04-18 RX ORDER — FUROSEMIDE 20 MG/1
50 TABLET ORAL DAILY
COMMUNITY

## 2023-04-18 RX ORDER — POTASSIUM CHLORIDE 20 MEQ/1
40 TABLET, EXTENDED RELEASE ORAL PRN
Status: DISCONTINUED | OUTPATIENT
Start: 2023-04-18 | End: 2023-04-20 | Stop reason: HOSPADM

## 2023-04-18 RX ORDER — SODIUM CHLORIDE 9 MG/ML
INJECTION, SOLUTION INTRAVENOUS PRN
Status: DISCONTINUED | OUTPATIENT
Start: 2023-04-18 | End: 2023-04-20 | Stop reason: HOSPADM

## 2023-04-18 RX ORDER — SODIUM CHLORIDE 0.9 % (FLUSH) 0.9 %
10 SYRINGE (ML) INJECTION PRN
Status: DISCONTINUED | OUTPATIENT
Start: 2023-04-18 | End: 2023-04-20 | Stop reason: HOSPADM

## 2023-04-18 RX ORDER — ENOXAPARIN SODIUM 100 MG/ML
40 INJECTION SUBCUTANEOUS NIGHTLY
Status: DISCONTINUED | OUTPATIENT
Start: 2023-04-18 | End: 2023-04-20 | Stop reason: HOSPADM

## 2023-04-18 RX ORDER — TRAMADOL HYDROCHLORIDE 50 MG/1
50 TABLET ORAL 2 TIMES DAILY
Status: ON HOLD | COMMUNITY
End: 2023-04-20 | Stop reason: SDUPTHER

## 2023-04-18 RX ORDER — SODIUM CHLORIDE 0.9 % (FLUSH) 0.9 %
5-40 SYRINGE (ML) INJECTION EVERY 12 HOURS SCHEDULED
Status: DISCONTINUED | OUTPATIENT
Start: 2023-04-18 | End: 2023-04-20 | Stop reason: HOSPADM

## 2023-04-18 RX ORDER — RISPERIDONE 1 MG/1
1 TABLET ORAL NIGHTLY
Status: DISCONTINUED | OUTPATIENT
Start: 2023-04-18 | End: 2023-04-20 | Stop reason: HOSPADM

## 2023-04-18 RX ORDER — TRAMADOL HYDROCHLORIDE 50 MG/1
50 TABLET ORAL EVERY 6 HOURS PRN
Status: ON HOLD | COMMUNITY
End: 2023-04-20 | Stop reason: HOSPADM

## 2023-04-18 RX ORDER — NYSTATIN 100000 [USP'U]/G
1 POWDER TOPICAL 2 TIMES DAILY PRN
COMMUNITY

## 2023-04-18 RX ORDER — SKIN PROTECTANT 44 G/100G
1 OINTMENT TOPICAL DAILY PRN
COMMUNITY

## 2023-04-18 RX ORDER — ACETAMINOPHEN 325 MG/1
650 TABLET ORAL EVERY 12 HOURS
Status: CANCELLED | OUTPATIENT
Start: 2023-04-18

## 2023-04-18 RX ORDER — DIPHENHYDRAMINE HCL 25 MG
25 TABLET ORAL EVERY 6 HOURS PRN
Status: DISCONTINUED | OUTPATIENT
Start: 2023-04-18 | End: 2023-04-20 | Stop reason: HOSPADM

## 2023-04-18 RX ORDER — POLYETHYLENE GLYCOL 3350 17 G/17G
17 POWDER, FOR SOLUTION ORAL DAILY PRN
Status: DISCONTINUED | OUTPATIENT
Start: 2023-04-18 | End: 2023-04-20 | Stop reason: HOSPADM

## 2023-04-18 RX ORDER — ASPIRIN 81 MG/1
81 TABLET ORAL DAILY
COMMUNITY

## 2023-04-18 RX ORDER — ESTRADIOL 0.1 MG/G
2 CREAM VAGINAL WEEKLY
COMMUNITY

## 2023-04-18 RX ADMIN — METOPROLOL TARTRATE 12.5 MG: 25 TABLET, FILM COATED ORAL at 20:34

## 2023-04-18 RX ADMIN — BUSPIRONE HYDROCHLORIDE 10 MG: 10 TABLET ORAL at 20:34

## 2023-04-18 RX ADMIN — ENOXAPARIN SODIUM 40 MG: 100 INJECTION SUBCUTANEOUS at 20:33

## 2023-04-18 RX ADMIN — LORAZEPAM 1 MG: 2 INJECTION INTRAMUSCULAR; INTRAVENOUS at 12:28

## 2023-04-18 RX ADMIN — DIPHENHYDRAMINE HYDROCHLORIDE 25 MG: 50 INJECTION, SOLUTION INTRAMUSCULAR; INTRAVENOUS at 20:33

## 2023-04-18 RX ADMIN — ASPIRIN 325 MG: 325 TABLET, COATED ORAL at 20:34

## 2023-04-18 RX ADMIN — SODIUM CHLORIDE: 9 INJECTION, SOLUTION INTRAVENOUS at 20:44

## 2023-04-18 RX ADMIN — IOPAMIDOL 75 ML: 755 INJECTION, SOLUTION INTRAVENOUS at 09:56

## 2023-04-18 RX ADMIN — RISPERIDONE 1 MG: 1 TABLET ORAL at 20:34

## 2023-04-18 RX ADMIN — BUSPIRONE HYDROCHLORIDE 10 MG: 10 TABLET ORAL at 16:06

## 2023-04-18 RX ADMIN — SODIUM CHLORIDE, PRESERVATIVE FREE 10 ML: 5 INJECTION INTRAVENOUS at 20:35

## 2023-04-18 ASSESSMENT — PAIN SCALES - PAIN ASSESSMENT IN ADVANCED DEMENTIA (PAINAD)
BODYLANGUAGE: 0
TOTALSCORE: 0
BREATHING: 0
CONSOLABILITY: 0
FACIALEXPRESSION: 0
NEGVOCALIZATION: 0

## 2023-04-18 NOTE — ED NOTES
Report given to MIESHA Borges at [facility].       Jacquie Bernstein RN  04/18/23 Fredy Stout 39. Paty Rogers RN  04/18/23 6084

## 2023-04-18 NOTE — ED NOTES
Upon Daughter arrival, she sts that NH told her pt walked out into [place] this morning and sts she did not feel well and felt like she was going to get sick. Pt then sat down and pointed to her chest saying that it hurt. Pt then had a syncopal episode. Pt has hx of dementia. Alert to self and place typically. Has short term memory loss as well per daughter.      Cony Joseph RN  04/18/23 0911

## 2023-04-18 NOTE — ED NOTES
2nd Trop was collected some time ago. No tubes available in tube station. Call was made to lab for tubes and blood Left in blue bin to send. Roldan f/u.       Mariano Welsh RN  04/18/23 1472

## 2023-04-18 NOTE — ED PROVIDER NOTES
refill less than 2 seconds. Musculoskeletal:  no major deformities noted. Neurologic: Alert & oriented x 3, CN II through XII are intact, normal motor function, normal sensory function, no focal deficits noted, no clonus, no tremors. Psychiatric: Affect normal, Mood normal.    NIH Stroke Scale-2  NIH Stroke Scale  Level of Consciousness (1a): Alert  LOC Questions (1b): Answers neither question correctly  LOC Commands (1c): Performs both tasks correctly  Best Gaze (2): Normal  Visual (3): No visual loss  Facial Palsy (4): (!) Minor paralysis  Motor Arm, Left (5a): No drift  Motor Arm, Right (5b): No drift  Motor Leg, Left (6a): No drift  Motor Leg, Right (6b): No drift  Limb Ataxia (7): Absent  Sensory (8): Normal  Best Language (9): No aphasia  Dysarthria (10): Normal  Extinction and Inattention (11): (!) Visual, tactile, auditory, spatial, or personal inattention  Total: 4     COURSE & MEDICAL DECISION MAKING  Pertinent Labs, EKG, & Imaging studies reviewed.  (See chart for details)    LABORATORY  Labs Reviewed   CBC WITH AUTO DIFFERENTIAL - Abnormal; Notable for the following components:       Result Value    RBC 3.48 (*)     Hemoglobin 10.9 (*)     Hematocrit 33.3 (*)     All other components within normal limits   COMPREHENSIVE METABOLIC PANEL W/ REFLEX TO MG FOR LOW K - Abnormal; Notable for the following components:    Glucose 144 (*)     Est, Glom Filt Rate 44 (*)     Calcium 7.9 (*)     Total Protein 5.4 (*)     Albumin 3.1 (*)     All other components within normal limits   TROPONIN - Abnormal; Notable for the following components:    Troponin, High Sensitivity 54 (*)     All other components within normal limits   POCT GLUCOSE - Abnormal; Notable for the following components:    POC Glucose 136 (*)     All other components within normal limits   POCT GLUCOSE - Normal   PROTIME-INR   URINALYSIS WITH REFLEX TO CULTURE   TROPONIN       EKG  EKG Interpretation    Interpreted by emergency department

## 2023-04-18 NOTE — ED NOTES
Per arley, had difficulty ambulating today and developed facial droop approx 10 min prior to their arrival at 0900. Pt has hx of dementia. Sts she was not following commands for them but upon arrival pt was alert and following RN simple commands.       Yuliet Tate RN  04/18/23 9287

## 2023-04-18 NOTE — ED NOTES
This writer assisted Greta HOROWITZ w/cleaning up and changing [address] [place] [address] Drive for incontinence.      Mathew Mcgee  04/18/23 7762

## 2023-04-18 NOTE — ED NOTES
Pt. To be admitted to [address] room 5123. Report called to [place], RN and transport to be initiated after room has been cleaned.       Sreedhar Bhatti RN  04/18/23 3355

## 2023-04-19 ENCOUNTER — APPOINTMENT (OUTPATIENT)
Dept: MRI IMAGING | Age: 88
End: 2023-04-19
Payer: COMMERCIAL

## 2023-04-19 LAB
BASOPHILS # BLD: 0 K/UL (ref 0–0.2)
BASOPHILS NFR BLD: 0.5 %
DEPRECATED RDW RBC AUTO: 14.8 % (ref 12.4–15.4)
EOSINOPHIL # BLD: 0.3 K/UL (ref 0–0.6)
EOSINOPHIL NFR BLD: 4 %
HCT VFR BLD AUTO: 38 % (ref 36–48)
HGB BLD-MCNC: 12.4 G/DL (ref 12–16)
LYMPHOCYTES # BLD: 1.9 K/UL (ref 1–5.1)
LYMPHOCYTES NFR BLD: 31 %
MCH RBC QN AUTO: 31.3 PG (ref 26–34)
MCHC RBC AUTO-ENTMCNC: 32.6 G/DL (ref 31–36)
MCV RBC AUTO: 96.1 FL (ref 80–100)
MONOCYTES # BLD: 0.7 K/UL (ref 0–1.3)
MONOCYTES NFR BLD: 11.3 %
NEUTROPHILS # BLD: 3.3 K/UL (ref 1.7–7.7)
NEUTROPHILS NFR BLD: 53.2 %
PLATELET # BLD AUTO: 137 K/UL (ref 135–450)
PMV BLD AUTO: 9.5 FL (ref 5–10.5)
RBC # BLD AUTO: 3.96 M/UL (ref 4–5.2)
WBC # BLD AUTO: 6.3 K/UL (ref 4–11)

## 2023-04-19 PROCEDURE — 94760 N-INVAS EAR/PLS OXIMETRY 1: CPT

## 2023-04-19 PROCEDURE — 97116 GAIT TRAINING THERAPY: CPT | Performed by: PHYSICAL THERAPIST

## 2023-04-19 PROCEDURE — 36415 COLL VENOUS BLD VENIPUNCTURE: CPT

## 2023-04-19 PROCEDURE — 99222 1ST HOSP IP/OBS MODERATE 55: CPT | Performed by: INTERNAL MEDICINE

## 2023-04-19 PROCEDURE — 97165 OT EVAL LOW COMPLEX 30 MIN: CPT

## 2023-04-19 PROCEDURE — 70551 MRI BRAIN STEM W/O DYE: CPT

## 2023-04-19 PROCEDURE — 1200000000 HC SEMI PRIVATE

## 2023-04-19 PROCEDURE — 9990000010 HC NO CHARGE VISIT: Performed by: PHYSICAL THERAPIST

## 2023-04-19 PROCEDURE — 6370000000 HC RX 637 (ALT 250 FOR IP): Performed by: HOSPITALIST

## 2023-04-19 PROCEDURE — 97162 PT EVAL MOD COMPLEX 30 MIN: CPT | Performed by: PHYSICAL THERAPIST

## 2023-04-19 PROCEDURE — 97530 THERAPEUTIC ACTIVITIES: CPT

## 2023-04-19 PROCEDURE — 97530 THERAPEUTIC ACTIVITIES: CPT | Performed by: PHYSICAL THERAPIST

## 2023-04-19 PROCEDURE — 2580000003 HC RX 258: Performed by: HOSPITALIST

## 2023-04-19 PROCEDURE — 85025 COMPLETE CBC W/AUTO DIFF WBC: CPT

## 2023-04-19 PROCEDURE — 6360000002 HC RX W HCPCS: Performed by: HOSPITALIST

## 2023-04-19 RX ORDER — HALOPERIDOL 5 MG/ML
2 INJECTION INTRAMUSCULAR EVERY 4 HOURS PRN
Status: DISCONTINUED | OUTPATIENT
Start: 2023-04-19 | End: 2023-04-20 | Stop reason: HOSPADM

## 2023-04-19 RX ADMIN — ASPIRIN 325 MG: 325 TABLET, COATED ORAL at 09:57

## 2023-04-19 RX ADMIN — METOPROLOL TARTRATE 12.5 MG: 25 TABLET, FILM COATED ORAL at 09:58

## 2023-04-19 RX ADMIN — LORAZEPAM 0.5 MG: 2 INJECTION INTRAMUSCULAR; INTRAVENOUS at 12:54

## 2023-04-19 RX ADMIN — BUSPIRONE HYDROCHLORIDE 10 MG: 10 TABLET ORAL at 11:22

## 2023-04-19 RX ADMIN — SODIUM CHLORIDE, PRESERVATIVE FREE 10 ML: 5 INJECTION INTRAVENOUS at 10:01

## 2023-04-19 RX ADMIN — BUSPIRONE HYDROCHLORIDE 10 MG: 10 TABLET ORAL at 21:27

## 2023-04-19 RX ADMIN — SODIUM CHLORIDE: 9 INJECTION, SOLUTION INTRAVENOUS at 17:51

## 2023-04-19 RX ADMIN — HALOPERIDOL LACTATE 2 MG: 5 INJECTION, SOLUTION INTRAMUSCULAR at 22:15

## 2023-04-19 RX ADMIN — RISPERIDONE 1 MG: 1 TABLET ORAL at 21:27

## 2023-04-19 RX ADMIN — BUSPIRONE HYDROCHLORIDE 10 MG: 10 TABLET ORAL at 16:21

## 2023-04-19 RX ADMIN — ENOXAPARIN SODIUM 40 MG: 100 INJECTION SUBCUTANEOUS at 21:27

## 2023-04-19 RX ADMIN — METOPROLOL TARTRATE 12.5 MG: 25 TABLET, FILM COATED ORAL at 21:27

## 2023-04-19 RX ADMIN — LORAZEPAM 0.5 MG: 2 INJECTION INTRAMUSCULAR; INTRAVENOUS at 06:17

## 2023-04-19 RX ADMIN — SODIUM CHLORIDE, PRESERVATIVE FREE 10 ML: 5 INJECTION INTRAVENOUS at 21:28

## 2023-04-19 ASSESSMENT — PAIN SCALES - PAIN ASSESSMENT IN ADVANCED DEMENTIA (PAINAD)
FACIALEXPRESSION: 0
BREATHING: 0
TOTALSCORE: 0
CONSOLABILITY: 0
BREATHING: 0
FACIALEXPRESSION: 0
CONSOLABILITY: 0
NEGVOCALIZATION: 0
BODYLANGUAGE: 0
NEGVOCALIZATION: 0
TOTALSCORE: 0
BODYLANGUAGE: 0

## 2023-04-19 NOTE — ACP (ADVANCE CARE PLANNING)
Advance Care Planning     Advance Care Planning Activator (Inpatient)  Conversation Note      Date of ACP Conversation: 4/19/2023     Conversation Conducted with:  Healthcare Decision Maker: Next of Kin by law (only applies in absence of above) (name) Shayy    ACP Activator: [place] HunterMary 45 Decision Maker:     Current Designated Health Care Decision Maker:     Primary Decision MakerGalevi President - Child - [phone]    Primary Decision Maker: Mani Rinaldi - Child - [phone]    Today we documented Decision Maker(s) consistent with Legal Next of Kin hierarchy. Care Preferences    Ventilation: \"If you were in your present state of health and suddenly became very ill and were unable to breathe on your own, what would your preference be about the use of a ventilator (breathing machine) if it were available to you? \"      Would the patient desire the use of ventilator (breathing machine)?: no    \"If your health worsens and it becomes clear that your chance of recovery is unlikely, what would your preference be about the use of a ventilator (breathing machine) if it were available to you? \"     Would the patient desire the use of ventilator (breathing machine)?: No      Resuscitation  \"CPR works best to restart the heart when there is a sudden event, like a heart attack, in someone who is otherwise healthy. Unfortunately, CPR does not typically restart the heart for people who have serious health conditions or who are very sick. \"    \"In the event your heart stopped as a result of an underlying serious health condition, would you want attempts to be made to restart your heart (answer \"yes\" for attempt to resuscitate) or would you prefer a natural death (answer \"no\" for do not attempt to resuscitate)? \" no       [] Yes   [] No   Educated Patient / Feliberto Chapis regarding differences between Advance Directives and portable DNR orders.     Length of ACP Conversation in minutes:

## 2023-04-19 NOTE — CONSULTS
shoulder shrug  CN12: tongue midline with protrusion  Strength: good strength in all 4 extremities   Sensory: light touch intact in all 4 extremities. Cerebellar/coordination: finger nose finger normal without ataxia  Tone: normal in all 4 extremities  Gait: deferred at this time for safety. Labs  Glucose 144  Na 139  K 4.3  BUN 19  Cr 1.2    Troponin (high sensitivity) 54 -> 69    ALT 18  AST 19    WBC 4.2K  Hg 10.9  Platelets 950    UA negative infection    Studies  Impression   1. No acute intracranial abnormality. No acute infarct. 2. Moderate global parenchymal volume loss with mild chronic microvascular   ischemic changes. CTA head/neck 4/18/23, independently reviewed  Impression   1. 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 2. No flow limiting stenosis otherwise seen of the cervical carotid/vertebral   arteries. 3. A penetrating atherosclerotic ulcer is seen of the proximal right cervical   ICA. 4. No significant stenosis or large vessel occlusion of the [id number]. Impression/Recommendations  Syncope. Chest pain. 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 Dementia. Seems rather unlikely that this was a primary neurologic event. She was complaining of chest pain and proceeded to lose consciousness. She could be at risk for seizure w/ her dementia/Tramadol though there was no documentation of any seizure like activity. No new focal neurologic deficits. Her maura MRI is w/out any acute intracranial findings. Further workup for syncope per primary. Consult cardiology. It appears she is already maintained on low dose asa for vascular prophylaxis.       Donovan Santos NP  [address] [place] [address] [address] Neurology    A copy of this note was provided for Dr Angelo Dorman MD
96.1 04/19/2023 05:46 AM    RDW 14.8 04/19/2023 05:46 AM     04/19/2023 05:46 AM     Lab Results   Component Value Date/Time     04/18/2023 11:02 AM    K 4.3 04/18/2023 11:02 AM     04/18/2023 11:02 AM    CO2 26 04/18/2023 11:02 AM    BUN 19 04/18/2023 11:02 AM    CREATININE 1.2 04/18/2023 11:02 AM    GFRAA 47 09/13/2021 10:25 AM    GFRAA >60 08/12/2011 01:48 PM    AGRATIO 1.3 04/18/2023 11:02 AM    LABGLOM 44 04/18/2023 11:02 AM    GLUCOSE 144 04/18/2023 11:02 AM    PROT 5.4 04/18/2023 11:02 AM    CALCIUM 7.9 04/18/2023 11:02 AM    BILITOT <0.2 04/18/2023 11:02 AM    ALKPHOS 69 04/18/2023 11:02 AM    AST 19 04/18/2023 11:02 AM    ALT 18 04/18/2023 11:02 AM     No results found for: PTINR  No results found for: LABA1C  Lab Results   Component Value Date    CKTOTAL 73 05/14/2017    TROPONINI <0.01 08/31/2017       Cardiac, Vascular and Imaging Data all Personally Reviewed in Detail by Myself      EKG: Sinus bradycardia with 1st degree A-V blockOtherwise normal ECGWhen compared with ECG of 31-AUG-2017 11:13,Premature atrial complexes are no longer PresentPR     Echocardiogram:     Stress Test:     Cath: Other imaging:     Assessment and Plan     -Dizziness syncope. Mildly elevated troponin of no consequences. No further cardiovascular work-up is required. MRI brain is nonspecific. We will sign off please call with any questions or concerns. Thank you for allowing us to participate in the care of [address]. Please do not hesitate to contact me if you have any questions.     Keon Mac MD, MPH    [facility], [address] [facility]  Ph: [phone]  Fax: [phone]    4/19/2023 3:06 PM

## 2023-04-19 NOTE — CARE COORDINATION
Case Management Assessment  Initial Evaluation    Date/Time of Evaluation: 4/19/2023 2:12 PM  Assessment Completed by: Tori Neely RN    If patient is discharged prior to next notation, then this note serves as note for discharge by case management. Patient Name: Orma Or                   YOB: 1935  Diagnosis: Syncope and collapse [R55]  Agitation [R45.1]  Chest pain, unspecified type [R07.9]                   Date / Time: 4/18/2023  9:38 AM    Patient Admission Status: Inpatient   Readmission Risk (Low < 19, Mod (19-27), High > 27): Readmission Risk Score: 13.4    Current PCP: No primary care provider on file. PCP verified by CM? Yes (MD at SNF)    Chart Reviewed: Yes      History Provided by: Child/Family  Patient Orientation: Other (see comment) (confusion)    Patient Cognition: Dementia / Early Alzheimer's    Hospitalization in the last 30 days (Readmission):  No    If yes, Readmission Assessment in  Navigator will be completed. Advance Directives:      Code Status: DNR-CCA   Patient's Primary Decision Maker is: Legal Next of Kin    Primary Decision Maker: Teena Polanco - [phone]    Primary Decision Maker: Bhavesh Carr Child - [phone]    Discharge Planning:    Patient lives with: Other (Comment) (SNF) Type of Home: Long-Term Care  Primary Care Giver: Other (Comment) (SNF-[facility])  Patient Support Systems include: Children   Current Financial resources: [address]  Current community resources: ECF/Home Care  Current services prior to admission: Extended Care Facility            Current DME:  None            Type of Home Care services:  None    ADLS  Prior functional level: Independent in ADLs/IADLs  Current functional level: Independent in ADLs/IADLs    PT AM-PAC:   pending/24  OT AM-PAC:   pending/24    Family can provide assistance at DC:  Yes  Would you like Case Management to discuss the discharge plan with any other

## 2023-04-20 VITALS
TEMPERATURE: 98 F | DIASTOLIC BLOOD PRESSURE: 58 MMHG | OXYGEN SATURATION: 90 % | SYSTOLIC BLOOD PRESSURE: 119 MMHG | HEART RATE: 55 BPM | WEIGHT: 176.81 LBS | RESPIRATION RATE: 18 BRPM | BODY MASS INDEX: 32.34 KG/M2

## 2023-04-20 LAB
ANION GAP SERPL CALCULATED.3IONS-SCNC: 10 MMOL/L (ref 3–16)
BUN SERPL-MCNC: 15 MG/DL (ref 7–20)
CALCIUM SERPL-MCNC: 8.8 MG/DL (ref 8.3–10.6)
CHLORIDE SERPL-SCNC: 109 MMOL/L (ref 99–110)
CO2 SERPL-SCNC: 24 MMOL/L (ref 21–32)
CREAT SERPL-MCNC: 1.1 MG/DL (ref 0.6–1.2)
DEPRECATED RDW RBC AUTO: 14.1 % (ref 12.4–15.4)
GFR SERPLBLD CREATININE-BSD FMLA CKD-EPI: 48 ML/MIN/{1.73_M2}
GLUCOSE SERPL-MCNC: 98 MG/DL (ref 70–99)
HCT VFR BLD AUTO: 36.4 % (ref 36–48)
HGB BLD-MCNC: 12.4 G/DL (ref 12–16)
MCH RBC QN AUTO: 31.7 PG (ref 26–34)
MCHC RBC AUTO-ENTMCNC: 34.1 G/DL (ref 31–36)
MCV RBC AUTO: 93.1 FL (ref 80–100)
PLATELET # BLD AUTO: 151 K/UL (ref 135–450)
PMV BLD AUTO: 8.9 FL (ref 5–10.5)
POTASSIUM SERPL-SCNC: 4 MMOL/L (ref 3.5–5.1)
RBC # BLD AUTO: 3.91 M/UL (ref 4–5.2)
SODIUM SERPL-SCNC: 143 MMOL/L (ref 136–145)
WBC # BLD AUTO: 7.2 K/UL (ref 4–11)

## 2023-04-20 PROCEDURE — 2580000003 HC RX 258: Performed by: HOSPITALIST

## 2023-04-20 PROCEDURE — 80048 BASIC METABOLIC PNL TOTAL CA: CPT

## 2023-04-20 PROCEDURE — 97530 THERAPEUTIC ACTIVITIES: CPT

## 2023-04-20 PROCEDURE — 85027 COMPLETE CBC AUTOMATED: CPT

## 2023-04-20 PROCEDURE — 94760 N-INVAS EAR/PLS OXIMETRY 1: CPT

## 2023-04-20 PROCEDURE — 6370000000 HC RX 637 (ALT 250 FOR IP): Performed by: HOSPITALIST

## 2023-04-20 PROCEDURE — 36415 COLL VENOUS BLD VENIPUNCTURE: CPT

## 2023-04-20 RX ORDER — TRAMADOL HYDROCHLORIDE 50 MG/1
50 TABLET ORAL 2 TIMES DAILY
Qty: 20 TABLET | Refills: 0 | Status: SHIPPED | OUTPATIENT
Start: 2023-04-20 | End: 2023-04-25

## 2023-04-20 RX ADMIN — BUSPIRONE HYDROCHLORIDE 10 MG: 10 TABLET ORAL at 08:32

## 2023-04-20 RX ADMIN — SODIUM CHLORIDE, PRESERVATIVE FREE 10 ML: 5 INJECTION INTRAVENOUS at 09:09

## 2023-04-20 RX ADMIN — ASPIRIN 325 MG: 325 TABLET, COATED ORAL at 08:32

## 2023-04-20 RX ADMIN — BUSPIRONE HYDROCHLORIDE 10 MG: 10 TABLET ORAL at 14:56

## 2023-04-20 RX ADMIN — ACETAMINOPHEN 650 MG: 325 TABLET ORAL at 06:06

## 2023-04-20 RX ADMIN — DIPHENHYDRAMINE HCL 25 MG: 25 TABLET ORAL at 14:56

## 2023-04-20 RX ADMIN — METOPROLOL TARTRATE 12.5 MG: 25 TABLET, FILM COATED ORAL at 08:33

## 2023-04-20 ASSESSMENT — PAIN SCALES - PAIN ASSESSMENT IN ADVANCED DEMENTIA (PAINAD)
BODYLANGUAGE: 0
TOTALSCORE: 4
CONSOLABILITY: 1
BREATHING: 0
CONSOLABILITY: 0
FACIALEXPRESSION: 0
FACIALEXPRESSION: 1
TOTALSCORE: 0
BODYLANGUAGE: 0
NEGVOCALIZATION: 0
BODYLANGUAGE: 1
BREATHING: 0
NEGVOCALIZATION: 1
CONSOLABILITY: 0
NEGVOCALIZATION: 0
BREATHING: 0
FACIALEXPRESSION: 0
TOTALSCORE: 0

## 2023-04-20 ASSESSMENT — PAIN SCALES - GENERAL
PAINLEVEL_OUTOF10: 0
PAINLEVEL_OUTOF10: 0

## 2023-04-20 ASSESSMENT — PAIN DESCRIPTION - LOCATION: LOCATION: HEAD

## 2023-04-20 NOTE — DISCHARGE INSTR - COC
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    Bunion, left foot M21.612    Syncope and collapse R55       Isolation/Infection:   Isolation            No Isolation          Patient Infection Status       Infection Onset Added Last Indicated Last Indicated By Review Planned Expiration Resolved Resolved By    None active    Resolved    COVID-19 (Rule Out) 20 COVID-19 (Ordered)   21 Infection             Nurse Assessment:  Last Vital Signs: BP (!) 119/58   Pulse 55   Temp 98 °F (36.7 °C) (Axillary)   Resp 18   Wt 176 lb 12.9 oz (80.2 kg)   SpO2 90%   BMI 32.34 kg/m²     Last documented pain score (0-10 scale): Pain Level: 0  Last Weight:   Wt Readings from Last 1 Encounters:   23 176 lb 12.9 oz (80.2 kg)     Mental Status:  disoriented and oriented    IV Access:  - None    Nursing Mobility/ADLs:  Walking   Assisted  Transfer  Assisted  Bathing  Assisted  Dressing  Assisted  Toileting  Assisted  Feeding  Assisted  Med Admin  Dependent  Med Delivery   whole and prefers mixed with applesauce    Wound Care Documentation and Therapy:  Wound 17 Skin tear Wrist Left (Active)   Number of days:        Wound 17 Skin tear Elbow Right (Active)   Number of days:        Wound 17 Skin tear Other (Comment) (Active)   Number of days:        Wound 17 Skin tear Left (Active)   Number of days:

## 2023-04-20 NOTE — CARE COORDINATION
CASE MANAGEMENT DISCHARGE SUMMARY:    DISCHARGE DATE: 4/20/2023    Discharging to Facility/ Agency   Name: Ty [facility]  Address:  [address]   Phone:  [phone]  Fax:  [phone]     TRANSPORTATION: via family             TIME: TBD by patient and bedside RN    INSURANCE PRECERT OBTAINED: N/A; LTC resident    HENS/PASAAR COMPLETED: N/A; LTC resident    COMMENTS: Patient, patient's family, bedside RN, and facility aware of discharge plan and timeline.     Electronically signed by Dalila Brady RN on 4/20/2023 at 3:46 PM

## 2023-04-20 NOTE — PROGRESS NOTES
4 Eyes Skin Assessment     NAME:  Jess Brand  YOB: 1935  MEDICAL RECORD NUMBER:  [id number]    The patient is being assessed for  Admission    I agree that at lease one RN has performed a thorough Head to Toe Skin Assessment on the patient. ALL assessment sites listed below have been assessed. Areas assessed by both nurses:    Head, Face, Ears, Shoulders, Back, Chest, Arms, Elbows, Hands, Sacrum. Buttock, Coccyx, Ischium, and Legs. Feet and Heels        Does the Patient have a Wound? Yes wound(s) were present on assessment.  LDA wound assessment was Initiated and completed by RN       Cr Prevention initiated by RN: Yes  Wound Care Orders initiated by RN: Yes    Pressure Injury (Stage 3,4, Unstageable, DTI, NWPT, and Complex wounds) if present, place referral order by RN under : No    New Ostomies, if present place, referral order under : No     Nurse 1 eSignature: Electronically signed by Genora Dandy, RN on 4/18/23 at 7:28 PM EDT    **SHARE this note so that the co-signing nurse can place an eSignature**    Nurse 2 eSignature: Electronically signed by Guillermo San RN on 4/18/23 at 11:24 PM EDT
Hospitalist Progress Note  4/19/2023 10:46 AM    PCP: No primary care provider on file. [phone]     Date of Admission: 4/18/2023                                                                                                                     HOSPITAL COURSE    Patient demographics:  The patient  Thang Rider is a 80 y.o. female     Significant past medical history:   Patient Active Problem List   Diagnosis    Chronic dermatitis of hands    Plantar fasciitis    Lumbar stenosis    Anxiety, generalized    Allergic rhinitis    Tobacco use disorder    Hypertension, essential, benign    Delirium    Vitamin B12 deficiency    Right-sided low back pain without sciatica    Failure to thrive in adult    Moderate episode of recurrent major depressive disorder (HCC)    Neutropenia (HCC)    Multifactorial dementia (HCC)    Syncope    Hyponatremia    Crossover toe deformity of left foot    Bunion, left foot    Syncope and collapse         Presenting symptoms:  Syncope    Diagnostic workup:      CONSULTS DURING ADMISSION :   IP CONSULT TO STROKE TEAM  IP CONSULT TO PHARMACY  PHARMACY TO CHANGE BASE FLUIDS  IP CONSULT TO NEUROLOGY      Patient was diagnosed with:        Treatment while inpatient:                                                                                         ----------------------------------------------------------      SUBJECTIVE COMPLAINTS- Syncope    Diet: ADULT DIET;  Regular      OBJECTIVE:   Patient Active Problem List   Diagnosis    Chronic dermatitis of hands    Plantar fasciitis    Lumbar stenosis    Anxiety, generalized    Allergic rhinitis    Tobacco use disorder    Hypertension, essential, benign    Delirium    Vitamin B12 deficiency    Right-sided low back pain without sciatica    Failure to thrive in adult    Moderate episode of recurrent major depressive disorder (HCC)    Neutropenia (HCC)    Multifactorial dementia (HCC)    Syncope    Hyponatremia    Crossover toe deformity
Medication Reconciliation    List of medications patient is currently taking is complete. Source of information: 1. Conversation with patient/RN/family                                      2. EPIC records                                       3. Medication records from [facility]     Allergies  Latex, Oxycodone, and Strawberry extract     Notes regarding home medications:   1. Patient received morning home medications prior to arrival to the ER today.     Neva Aase, [facility], PharmD, BCPS  4/18/2023 2:13 PM
Occupational Therapy      OT order received and chart reviewed. Pt off floor for MRI. Will follow up as schedule and pt condition permit.     Electronically signed by Broderick Hyatt OT on 4/19/2023 at 1:06 PM
Patient admitted from ED. Daughter present at bedside. Patient and daughter oriented to unit, call light. Tele placed on patient, confirmed with [facility]. Bed alarm set. Patient asked to call for assist before getting out of bed. Continue to monitor.
Physical Therapy  Tammy Holirina  [id number]  [id number]    Attempted to see for PT eval however pt off the floor for MRI; will attempt later as schedule permits  Electronically signed by KIERA LAND PT on 4/19/2023 at 1:12 PM
Pt has tele sitter in room and continues to get out of bed w/o assistance. Pt is agitated and has been attempting to kick at staff. MD was notified. New orders placed.
Report called to [facility] and [facility]- [phone]. Pt left facility via wheelchair with daughter for discharge via private car.  Electronically signed by Cassidy Myers RN on 4/20/2023 at 4:00 PM
Slept majority of night shift. Remains oriented to self only which is baseline for this patient. Unable to arouse enough from sleep to participate in routine checks and assessments which also appears to be baseline for this patient. Awoke at 0600 in a state of constantly wanting to get up and wander. PRN for anxiety given, no noticeable effect. Incontinent of bladder, purewick in place. In bed, safety precautions in place.
evaluation without incident  Activity Tolerance Comments: pt had ativan prior to her MRI therefore deferred further gait    Bed mobility  Supine to Sit: Moderate assistance (HOB elevated)  Sit to Supine:  (pt in chair at end of session)    Transfers  Sit to stand: Contact guard assistance;Minimal assistance  Stand to sit: Contact guard assistance;Minimal assistance  Transfer Comments: took a few steps bed > chair with hand hold assist.    Vision  Vision: Within Functional Limits  Hearing  Hearing: Within functional limits    Cognition  Overall Cognitive Status: Exceptions  Arousal/Alertness: Appropriate responses to stimuli  Following Commands: Follows one step commands with increased time; Follows one step commands with repetition  Attention Span: Difficulty dividing attention; Attends with cues to redirect  Memory: Decreased recall of recent events;Decreased short term memory  Safety Judgement: Decreased awareness of need for safety;Decreased awareness of need for assistance  Initiation: Requires cues for some  Orientation  Overall Orientation Status: Impaired  Orientation Level: Oriented to time;Disoriented to place; Disoriented to time;Disoriented to situation                  Education Given To: Patient; Family  Education Provided: Role of Therapy;Plan of Care;Transfer Training  Education Method: Demonstration;Verbal  Barriers to Learning: Cognition  Education Outcome: Continued education needed    LUE AROM (degrees)  LUE AROM : WFL  Left Hand AROM (degrees)  Left Hand AROM: WF  RUE AROM (degrees)  RUE AROM : WFL  Right Hand AROM (degrees)  Right Hand AROM: [facility]    AM-PAC Score  AM-[facility] Inpatient Daily Activity Raw Score: 17 (04/19/23 1519)  AM-PAC Inpatient ADL T-Scale Score : 37.26 (04/19/23 1519)  ADL Inpatient CMS 0-100% Score: 50.11 (04/19/23 1519)  ADL Inpatient CMS G-Code Modifier : CK (04/19/23 1519)      Goals  Short Term Goals  Time Frame for Short Term Goals: Prior to DC:   Short Term Goal 1: Pt will
Goal 1: bed mob sup  Short Term Goal 2: transfers sup  Short Term Goal 3: amb 100' without an AD SBA  Patient Goals   Patient Goals : pt unable to state a goal; daughter reports plan is to return to [facility] at discharge       Education  Patient Education  Education Given To: Patient; Family  Education Provided: Role of Therapy  Education Provided Comments: revierwed call light and not getting up without assist  Education Method: Verbal  Education Outcome: Verbalized understanding;Continued education needed      Therapy Time   Individual Concurrent Group Co-treatment   Time In 1446         Time Out 1         Minutes 42                 KIERA LAND, KAILEY   Electronically signed by KIERA LAND PT on 4/19/2023 at 3:29 PM
chair)  Scooting: Minimal assistance  Bed Mobility Comments: Cuing and facilitation with all mobility  Transfers  Sit to Stand: Moderate Assistance;Minimal Assistance  Stand to Sit: Minimal Assistance; Moderate Assistance  Bed to Chair: Moderate assistance (stand step transfer)  Ambulation  Assistance: Unable to assess (Pt. c/o feeling like going to pass out and remains drowsy following getting to the chair)     Balance  Sitting - Static: Good  Sitting - Dynamic: Fair;+  Standing - Static: Poor  Standing - Dynamic: Poor  Comments: Stood at the EOB but Pt. c/o feeling like going to pass out. Exercise Treatment: Sitting with feet elevated:  AP's x 10, hip abd/add x 10 and heel slides x 10 - with assist from PT for all                                                       AM-PAC Score  AM-PAC Inpatient Mobility Raw Score : 12 (04/20/23 1022)  AM-PAC Inpatient T-Scale Score : 35.33 (04/20/23 1022)  Mobility Inpatient CMS 0-100% Score: 68.66 (04/20/23 1022)  Mobility Inpatient CMS G-Code Modifier : CL (04/20/23 1022)              Goals  Short Term Goals  Time Frame for Short Term Goals: by discharge - Ongoing 4/20  Short Term Goal 1: bed mob sup  Short Term Goal 2: transfers sup  Short Term Goal 3: amb 100' without an AD SBA  Patient Goals   Patient Goals : pt unable to state a goal; daughter reports plan is to return to [facility] at discharge       Education  Patient Education  Education Given To: Patient; Family  Education Provided: Role of Therapy  Education Provided Comments: revierwed call light and not getting up without assist  Education Method: Verbal  Barriers to Learning: Cognition  Education Outcome: Verbalized understanding;Continued education needed      Therapy Time   Individual Concurrent Group Co-treatment   Time In 0948         Time Out 1020         Minutes 32         Timed Code Treatment Minutes: [facility], [address] [facility], [id number]

## 2023-04-22 NOTE — DISCHARGE SUMMARY
MG tablet  Commonly known as: RISPERDAL           * This list has 2 medication(s) that are the same as other medications prescribed for you. Read the directions carefully, and ask your doctor or other care provider to review them with you. Where to Get Your Medications        You can get these medications from any pharmacy    Bring a paper prescription for each of these medications  traMADol 50 MG tablet            Time Spent on discharge is more than 30 min in the examination, evaluation, counseling and review of medications and discharge plan. Signed:  Steven Pate MD   4/22/2023      Thank you No primary care provider on file. for the opportunity to be involved in this patient's care. If you have any questions or concerns please feel free to contact me at [phone]. This note was transcribed using [id number]. Please disregard any translational errors.

## 2023-09-26 ENCOUNTER — APPOINTMENT (OUTPATIENT)
Dept: GENERAL RADIOLOGY | Age: 88
End: 2023-09-26
Payer: COMMERCIAL

## 2023-09-26 ENCOUNTER — APPOINTMENT (OUTPATIENT)
Dept: CT IMAGING | Age: 88
End: 2023-09-26
Payer: COMMERCIAL

## 2023-09-26 ENCOUNTER — HOSPITAL ENCOUNTER (INPATIENT)
Age: 88
LOS: 9 days | Discharge: NURSING, REHAB OR LONG-TERM CARE | End: 2023-10-05
Attending: EMERGENCY MEDICINE | Admitting: STUDENT IN AN ORGANIZED HEALTH CARE EDUCATION/TRAINING PROGRAM
Payer: COMMERCIAL

## 2023-09-26 DIAGNOSIS — S72.142A CLOSED DISPLACED INTERTROCHANTERIC FRACTURE OF LEFT FEMUR, INITIAL ENCOUNTER (HCC): Primary | ICD-10-CM

## 2023-09-26 DIAGNOSIS — N17.9 AKI (ACUTE KIDNEY INJURY) (HCC): ICD-10-CM

## 2023-09-26 DIAGNOSIS — T14.8XXA BRUISING: ICD-10-CM

## 2023-09-26 PROBLEM — S42.212A: Status: ACTIVE | Noted: 2023-09-26

## 2023-09-26 LAB
ALBUMIN SERPL-MCNC: 3.3 G/DL (ref 3.4–5)
ALBUMIN/GLOB SERPL: 1.6 {RATIO} (ref 1.1–2.2)
ALP SERPL-CCNC: 66 U/L (ref 40–129)
ALT SERPL-CCNC: 18 U/L (ref 10–40)
ANION GAP SERPL CALCULATED.3IONS-SCNC: 12 MMOL/L (ref 3–16)
APTT BLD: 28 SEC (ref 22.7–35.9)
AST SERPL-CCNC: 24 U/L (ref 15–37)
BILIRUB SERPL-MCNC: 0.5 MG/DL (ref 0–1)
BUN SERPL-MCNC: 33 MG/DL (ref 7–20)
CALCIUM SERPL-MCNC: 7.8 MG/DL (ref 8.3–10.6)
CHLORIDE SERPL-SCNC: 98 MMOL/L (ref 99–110)
CO2 SERPL-SCNC: 25 MMOL/L (ref 21–32)
CREAT SERPL-MCNC: 1.7 MG/DL (ref 0.6–1.2)
GFR SERPLBLD CREATININE-BSD FMLA CKD-EPI: 29 ML/MIN/{1.73_M2}
GLUCOSE SERPL-MCNC: 175 MG/DL (ref 70–99)
INR PPP: 1.08 (ref 0.84–1.16)
POTASSIUM SERPL-SCNC: 5.1 MMOL/L (ref 3.5–5.1)
PROT SERPL-MCNC: 5.4 G/DL (ref 6.4–8.2)
PROTHROMBIN TIME: 14 SEC (ref 11.5–14.8)
REASON FOR REJECTION: NORMAL
REJECTED TEST: NORMAL
SODIUM SERPL-SCNC: 135 MMOL/L (ref 136–145)

## 2023-09-26 PROCEDURE — 2580000003 HC RX 258: Performed by: EMERGENCY MEDICINE

## 2023-09-26 PROCEDURE — 71045 X-RAY EXAM CHEST 1 VIEW: CPT

## 2023-09-26 PROCEDURE — 70450 CT HEAD/BRAIN W/O DYE: CPT

## 2023-09-26 PROCEDURE — 36415 COLL VENOUS BLD VENIPUNCTURE: CPT

## 2023-09-26 PROCEDURE — 72125 CT NECK SPINE W/O DYE: CPT

## 2023-09-26 PROCEDURE — 93005 ELECTROCARDIOGRAM TRACING: CPT | Performed by: EMERGENCY MEDICINE

## 2023-09-26 PROCEDURE — 6370000000 HC RX 637 (ALT 250 FOR IP): Performed by: STUDENT IN AN ORGANIZED HEALTH CARE EDUCATION/TRAINING PROGRAM

## 2023-09-26 PROCEDURE — 73090 X-RAY EXAM OF FOREARM: CPT

## 2023-09-26 PROCEDURE — 85610 PROTHROMBIN TIME: CPT

## 2023-09-26 PROCEDURE — 2580000003 HC RX 258: Performed by: STUDENT IN AN ORGANIZED HEALTH CARE EDUCATION/TRAINING PROGRAM

## 2023-09-26 PROCEDURE — 1200000000 HC SEMI PRIVATE

## 2023-09-26 PROCEDURE — 73110 X-RAY EXAM OF WRIST: CPT

## 2023-09-26 PROCEDURE — 99285 EMERGENCY DEPT VISIT HI MDM: CPT

## 2023-09-26 PROCEDURE — 73502 X-RAY EXAM HIP UNI 2-3 VIEWS: CPT

## 2023-09-26 PROCEDURE — 85730 THROMBOPLASTIN TIME PARTIAL: CPT

## 2023-09-26 PROCEDURE — 80053 COMPREHEN METABOLIC PANEL: CPT

## 2023-09-26 RX ORDER — LANOLIN ALCOHOL/MO/W.PET/CERES
6 CREAM (GRAM) TOPICAL NIGHTLY
Status: DISCONTINUED | OUTPATIENT
Start: 2023-09-26 | End: 2023-10-05 | Stop reason: HOSPADM

## 2023-09-26 RX ORDER — 0.9 % SODIUM CHLORIDE 0.9 %
500 INTRAVENOUS SOLUTION INTRAVENOUS ONCE
Status: COMPLETED | OUTPATIENT
Start: 2023-09-26 | End: 2023-09-26

## 2023-09-26 RX ORDER — SODIUM CHLORIDE, SODIUM LACTATE, POTASSIUM CHLORIDE, CALCIUM CHLORIDE 600; 310; 30; 20 MG/100ML; MG/100ML; MG/100ML; MG/100ML
INJECTION, SOLUTION INTRAVENOUS CONTINUOUS
Status: DISCONTINUED | OUTPATIENT
Start: 2023-09-26 | End: 2023-09-27

## 2023-09-26 RX ORDER — SODIUM CHLORIDE 0.9 % (FLUSH) 0.9 %
5-40 SYRINGE (ML) INJECTION EVERY 12 HOURS SCHEDULED
Status: DISCONTINUED | OUTPATIENT
Start: 2023-09-26 | End: 2023-10-03

## 2023-09-26 RX ORDER — PANTOPRAZOLE SODIUM 40 MG/1
40 TABLET, DELAYED RELEASE ORAL
Status: DISCONTINUED | OUTPATIENT
Start: 2023-09-27 | End: 2023-10-05 | Stop reason: HOSPADM

## 2023-09-26 RX ORDER — POTASSIUM CHLORIDE 7.45 MG/ML
10 INJECTION INTRAVENOUS PRN
Status: DISCONTINUED | OUTPATIENT
Start: 2023-09-26 | End: 2023-09-26

## 2023-09-26 RX ORDER — RISPERIDONE 1 MG/1
1 TABLET ORAL NIGHTLY
Status: DISCONTINUED | OUTPATIENT
Start: 2023-09-26 | End: 2023-10-05 | Stop reason: HOSPADM

## 2023-09-26 RX ORDER — ONDANSETRON 4 MG/1
4 TABLET, ORALLY DISINTEGRATING ORAL EVERY 8 HOURS PRN
Status: DISCONTINUED | OUTPATIENT
Start: 2023-09-26 | End: 2023-10-05 | Stop reason: HOSPADM

## 2023-09-26 RX ORDER — ACETAMINOPHEN 325 MG/1
650 TABLET ORAL EVERY 6 HOURS SCHEDULED
Status: DISCONTINUED | OUTPATIENT
Start: 2023-09-27 | End: 2023-10-05 | Stop reason: HOSPADM

## 2023-09-26 RX ORDER — ASPIRIN 81 MG/1
81 TABLET ORAL DAILY
Status: DISCONTINUED | OUTPATIENT
Start: 2023-09-27 | End: 2023-09-27

## 2023-09-26 RX ORDER — SODIUM CHLORIDE 0.9 % (FLUSH) 0.9 %
5-40 SYRINGE (ML) INJECTION PRN
Status: DISCONTINUED | OUTPATIENT
Start: 2023-09-26 | End: 2023-09-27

## 2023-09-26 RX ORDER — SODIUM CHLORIDE 9 MG/ML
INJECTION, SOLUTION INTRAVENOUS PRN
Status: DISCONTINUED | OUTPATIENT
Start: 2023-09-26 | End: 2023-10-03

## 2023-09-26 RX ORDER — BUSPIRONE HYDROCHLORIDE 10 MG/1
10 TABLET ORAL 3 TIMES DAILY
Status: DISCONTINUED | OUTPATIENT
Start: 2023-09-26 | End: 2023-10-05 | Stop reason: HOSPADM

## 2023-09-26 RX ORDER — ENOXAPARIN SODIUM 100 MG/ML
30 INJECTION SUBCUTANEOUS EVERY EVENING
Status: DISCONTINUED | OUTPATIENT
Start: 2023-09-27 | End: 2023-09-27

## 2023-09-26 RX ORDER — ONDANSETRON 2 MG/ML
4 INJECTION INTRAMUSCULAR; INTRAVENOUS EVERY 6 HOURS PRN
Status: DISCONTINUED | OUTPATIENT
Start: 2023-09-26 | End: 2023-10-05 | Stop reason: HOSPADM

## 2023-09-26 RX ORDER — MAGNESIUM SULFATE IN WATER 40 MG/ML
2000 INJECTION, SOLUTION INTRAVENOUS PRN
Status: DISCONTINUED | OUTPATIENT
Start: 2023-09-26 | End: 2023-09-26

## 2023-09-26 RX ORDER — OXYCODONE HYDROCHLORIDE 5 MG/1
5 TABLET ORAL EVERY 6 HOURS PRN
Status: DISCONTINUED | OUTPATIENT
Start: 2023-09-26 | End: 2023-09-27

## 2023-09-26 RX ORDER — POLYETHYLENE GLYCOL 3350 17 G/17G
17 POWDER, FOR SOLUTION ORAL DAILY PRN
Status: DISCONTINUED | OUTPATIENT
Start: 2023-09-26 | End: 2023-10-05 | Stop reason: HOSPADM

## 2023-09-26 RX ORDER — POTASSIUM CHLORIDE 20 MEQ/1
20 TABLET, EXTENDED RELEASE ORAL 2 TIMES DAILY
Status: DISCONTINUED | OUTPATIENT
Start: 2023-09-27 | End: 2023-09-27

## 2023-09-26 RX ADMIN — SODIUM CHLORIDE, POTASSIUM CHLORIDE, SODIUM LACTATE AND CALCIUM CHLORIDE: 600; 310; 30; 20 INJECTION, SOLUTION INTRAVENOUS at 23:16

## 2023-09-26 RX ADMIN — ACETAMINOPHEN 650 MG: 325 TABLET ORAL at 23:17

## 2023-09-26 RX ADMIN — RISPERIDONE 1 MG: 1 TABLET ORAL at 23:17

## 2023-09-26 RX ADMIN — BUSPIRONE HYDROCHLORIDE 10 MG: 10 TABLET ORAL at 23:17

## 2023-09-26 RX ADMIN — SODIUM CHLORIDE 500 ML: 9 INJECTION, SOLUTION INTRAVENOUS at 21:03

## 2023-09-26 ASSESSMENT — PAIN SCALES - PAIN ASSESSMENT IN ADVANCED DEMENTIA (PAINAD)
BODYLANGUAGE: 0
TOTALSCORE: 1
FACIALEXPRESSION: 0
CONSOLABILITY: 0
BODYLANGUAGE: 0
FACIALEXPRESSION: 0
TOTALSCORE: 1
BREATHING: 0
CONSOLABILITY: 0
NEGVOCALIZATION: 1
BREATHING: 0
NEGVOCALIZATION: 1

## 2023-09-26 ASSESSMENT — PAIN - FUNCTIONAL ASSESSMENT: PAIN_FUNCTIONAL_ASSESSMENT: PAIN ASSESSMENT IN ADVANCED DEMENTIA (PAINAD)
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
## 2023-09-26 NOTE — ED NOTES
Shift handoff report given to Santiago Ann RN. VSS and call light within reach. All questions answered at this time.        Everett Madden RN  09/26/23 9618
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
## 2023-09-27 ENCOUNTER — ANESTHESIA (OUTPATIENT)
Dept: OPERATING ROOM | Age: 88
End: 2023-09-27
Payer: COMMERCIAL

## 2023-09-27 ENCOUNTER — APPOINTMENT (OUTPATIENT)
Dept: GENERAL RADIOLOGY | Age: 88
End: 2023-09-27
Payer: COMMERCIAL

## 2023-09-27 ENCOUNTER — ANESTHESIA EVENT (OUTPATIENT)
Dept: OPERATING ROOM | Age: 88
End: 2023-09-27
Payer: COMMERCIAL

## 2023-09-27 PROBLEM — S72.142K CLOSED DISPLACED INTERTROCHANTERIC FRACTURE OF LEFT FEMUR WITH NONUNION: Status: ACTIVE | Noted: 2023-09-27

## 2023-09-27 PROBLEM — S72.142A CLOSED DISPLACED INTERTROCHANTERIC FRACTURE OF LEFT FEMUR (HCC): Status: ACTIVE | Noted: 2023-09-27

## 2023-09-27 LAB
ABO + RH BLD: NORMAL
ANION GAP SERPL CALCULATED.3IONS-SCNC: 9 MMOL/L (ref 3–16)
BASOPHILS # BLD: 0 K/UL (ref 0–0.2)
BASOPHILS NFR BLD: 0.1 %
BLD GP AB SCN SERPL QL: NORMAL
BUN SERPL-MCNC: 42 MG/DL (ref 7–20)
CALCIUM SERPL-MCNC: 7.4 MG/DL (ref 8.3–10.6)
CHLORIDE SERPL-SCNC: 101 MMOL/L (ref 99–110)
CO2 SERPL-SCNC: 25 MMOL/L (ref 21–32)
CREAT SERPL-MCNC: 2.8 MG/DL (ref 0.6–1.2)
DEPRECATED RDW RBC AUTO: 14.2 % (ref 12.4–15.4)
DEPRECATED RDW RBC AUTO: 14.5 % (ref 12.4–15.4)
EKG ATRIAL RATE: 69 BPM
EKG DIAGNOSIS: NORMAL
EKG P AXIS: 57 DEGREES
EKG P-R INTERVAL: 182 MS
EKG Q-T INTERVAL: 400 MS
EKG QRS DURATION: 68 MS
EKG QTC CALCULATION (BAZETT): 428 MS
EKG R AXIS: 23 DEGREES
EKG T AXIS: 60 DEGREES
EKG VENTRICULAR RATE: 69 BPM
EOSINOPHIL # BLD: 0 K/UL (ref 0–0.6)
EOSINOPHIL NFR BLD: 0 %
GFR SERPLBLD CREATININE-BSD FMLA CKD-EPI: 16 ML/MIN/{1.73_M2}
GLUCOSE BLD-MCNC: 166 MG/DL (ref 70–99)
GLUCOSE SERPL-MCNC: 150 MG/DL (ref 70–99)
HCT VFR BLD AUTO: 20.7 % (ref 36–48)
HCT VFR BLD AUTO: 21.6 % (ref 36–48)
HGB BLD-MCNC: 7.1 G/DL (ref 12–16)
HGB BLD-MCNC: 7.2 G/DL (ref 12–16)
LYMPHOCYTES # BLD: 1.2 K/UL (ref 1–5.1)
LYMPHOCYTES NFR BLD: 14.2 %
MCH RBC QN AUTO: 31.5 PG (ref 26–34)
MCH RBC QN AUTO: 31.6 PG (ref 26–34)
MCHC RBC AUTO-ENTMCNC: 33.6 G/DL (ref 31–36)
MCHC RBC AUTO-ENTMCNC: 34.3 G/DL (ref 31–36)
MCV RBC AUTO: 92.2 FL (ref 80–100)
MCV RBC AUTO: 93.7 FL (ref 80–100)
MONOCYTES # BLD: 1.4 K/UL (ref 0–1.3)
MONOCYTES NFR BLD: 16.3 %
NEUTROPHILS # BLD: 6.1 K/UL (ref 1.7–7.7)
NEUTROPHILS NFR BLD: 69.4 %
PERFORMED ON: ABNORMAL
PLATELET # BLD AUTO: 128 K/UL (ref 135–450)
PLATELET # BLD AUTO: 143 K/UL (ref 135–450)
PMV BLD AUTO: 8.5 FL (ref 5–10.5)
PMV BLD AUTO: 8.8 FL (ref 5–10.5)
POTASSIUM SERPL-SCNC: 5.4 MMOL/L (ref 3.5–5.1)
RBC # BLD AUTO: 2.25 M/UL (ref 4–5.2)
RBC # BLD AUTO: 2.3 M/UL (ref 4–5.2)
SODIUM SERPL-SCNC: 135 MMOL/L (ref 136–145)
T4 FREE SERPL-MCNC: 1.1 NG/DL (ref 0.9–1.8)
TSH SERPL DL<=0.005 MIU/L-ACNC: 4.81 UIU/ML (ref 0.27–4.2)
WBC # BLD AUTO: 13.1 K/UL (ref 4–11)
WBC # BLD AUTO: 8.8 K/UL (ref 4–11)

## 2023-09-27 PROCEDURE — C1769 GUIDE WIRE: HCPCS | Performed by: ORTHOPAEDIC SURGERY

## 2023-09-27 PROCEDURE — 80048 BASIC METABOLIC PNL TOTAL CA: CPT

## 2023-09-27 PROCEDURE — 2580000003 HC RX 258: Performed by: ORTHOPAEDIC SURGERY

## 2023-09-27 PROCEDURE — 86850 RBC ANTIBODY SCREEN: CPT

## 2023-09-27 PROCEDURE — 2580000003 HC RX 258: Performed by: NURSE PRACTITIONER

## 2023-09-27 PROCEDURE — P9045 ALBUMIN (HUMAN), 5%, 250 ML: HCPCS | Performed by: ANESTHESIOLOGY

## 2023-09-27 PROCEDURE — 6360000002 HC RX W HCPCS: Performed by: ORTHOPAEDIC SURGERY

## 2023-09-27 PROCEDURE — 84439 ASSAY OF FREE THYROXINE: CPT

## 2023-09-27 PROCEDURE — 3600000014 HC SURGERY LEVEL 4 ADDTL 15MIN: Performed by: ORTHOPAEDIC SURGERY

## 2023-09-27 PROCEDURE — 0QS706Z REPOSITION LEFT UPPER FEMUR WITH INTRAMEDULLARY INTERNAL FIXATION DEVICE, OPEN APPROACH: ICD-10-PCS | Performed by: ORTHOPAEDIC SURGERY

## 2023-09-27 PROCEDURE — 86900 BLOOD TYPING SEROLOGIC ABO: CPT

## 2023-09-27 PROCEDURE — 6360000002 HC RX W HCPCS: Performed by: ANESTHESIOLOGY

## 2023-09-27 PROCEDURE — 6370000000 HC RX 637 (ALT 250 FOR IP): Performed by: NURSE PRACTITIONER

## 2023-09-27 PROCEDURE — 94760 N-INVAS EAR/PLS OXIMETRY 1: CPT

## 2023-09-27 PROCEDURE — 6370000000 HC RX 637 (ALT 250 FOR IP): Performed by: ORTHOPAEDIC SURGERY

## 2023-09-27 PROCEDURE — 36415 COLL VENOUS BLD VENIPUNCTURE: CPT

## 2023-09-27 PROCEDURE — 85025 COMPLETE CBC W/AUTO DIFF WBC: CPT

## 2023-09-27 PROCEDURE — 76000 FLUOROSCOPY <1 HR PHYS/QHP: CPT | Performed by: ORTHOPAEDIC SURGERY

## 2023-09-27 PROCEDURE — 86901 BLOOD TYPING SEROLOGIC RH(D): CPT

## 2023-09-27 PROCEDURE — 2709999900 HC NON-CHARGEABLE SUPPLY: Performed by: ORTHOPAEDIC SURGERY

## 2023-09-27 PROCEDURE — 51798 US URINE CAPACITY MEASURE: CPT

## 2023-09-27 PROCEDURE — 85027 COMPLETE CBC AUTOMATED: CPT

## 2023-09-27 PROCEDURE — 2580000003 HC RX 258: Performed by: STUDENT IN AN ORGANIZED HEALTH CARE EDUCATION/TRAINING PROGRAM

## 2023-09-27 PROCEDURE — 86923 COMPATIBILITY TEST ELECTRIC: CPT

## 2023-09-27 PROCEDURE — 84443 ASSAY THYROID STIM HORMONE: CPT

## 2023-09-27 PROCEDURE — 36430 TRANSFUSION BLD/BLD COMPNT: CPT

## 2023-09-27 PROCEDURE — 3600000004 HC SURGERY LEVEL 4 BASE: Performed by: ORTHOPAEDIC SURGERY

## 2023-09-27 PROCEDURE — 99222 1ST HOSP IP/OBS MODERATE 55: CPT | Performed by: ORTHOPAEDIC SURGERY

## 2023-09-27 PROCEDURE — C1713 ANCHOR/SCREW BN/BN,TIS/BN: HCPCS | Performed by: ORTHOPAEDIC SURGERY

## 2023-09-27 PROCEDURE — P9016 RBC LEUKOCYTES REDUCED: HCPCS

## 2023-09-27 PROCEDURE — 3700000001 HC ADD 15 MINUTES (ANESTHESIA): Performed by: ORTHOPAEDIC SURGERY

## 2023-09-27 PROCEDURE — 2500000003 HC RX 250 WO HCPCS: Performed by: ANESTHESIOLOGY

## 2023-09-27 PROCEDURE — 2720000010 HC SURG SUPPLY STERILE: Performed by: ORTHOPAEDIC SURGERY

## 2023-09-27 PROCEDURE — 30233N1 TRANSFUSION OF NONAUTOLOGOUS RED BLOOD CELLS INTO PERIPHERAL VEIN, PERCUTANEOUS APPROACH: ICD-10-PCS | Performed by: STUDENT IN AN ORGANIZED HEALTH CARE EDUCATION/TRAINING PROGRAM

## 2023-09-27 PROCEDURE — 6360000002 HC RX W HCPCS: Performed by: NURSE PRACTITIONER

## 2023-09-27 PROCEDURE — 73502 X-RAY EXAM HIP UNI 2-3 VIEWS: CPT

## 2023-09-27 PROCEDURE — 6370000000 HC RX 637 (ALT 250 FOR IP): Performed by: INTERNAL MEDICINE

## 2023-09-27 PROCEDURE — 93010 ELECTROCARDIOGRAM REPORT: CPT | Performed by: INTERNAL MEDICINE

## 2023-09-27 PROCEDURE — 7100000000 HC PACU RECOVERY - FIRST 15 MIN: Performed by: ORTHOPAEDIC SURGERY

## 2023-09-27 PROCEDURE — A4217 STERILE WATER/SALINE, 500 ML: HCPCS | Performed by: ORTHOPAEDIC SURGERY

## 2023-09-27 PROCEDURE — 27245 TREAT THIGH FRACTURE: CPT | Performed by: ORTHOPAEDIC SURGERY

## 2023-09-27 PROCEDURE — 1200000000 HC SEMI PRIVATE

## 2023-09-27 PROCEDURE — 7100000001 HC PACU RECOVERY - ADDTL 15 MIN: Performed by: ORTHOPAEDIC SURGERY

## 2023-09-27 PROCEDURE — 3700000000 HC ANESTHESIA ATTENDED CARE: Performed by: ORTHOPAEDIC SURGERY

## 2023-09-27 DEVICE — NAIL IM L235MM DIA11MM 130DEG SHT L PROX FEM GRN TI CANN: Type: IMPLANTABLE DEVICE | Site: HIP | Status: FUNCTIONAL

## 2023-09-27 DEVICE — IMPLANTABLE DEVICE: Type: IMPLANTABLE DEVICE | Site: HIP | Status: FUNCTIONAL

## 2023-09-27 DEVICE — SCREW BNE L34MM DIA5MM TIB LT GRN TI ST CANN LOK FULL THRD: Type: IMPLANTABLE DEVICE | Site: HIP | Status: FUNCTIONAL

## 2023-09-27 RX ORDER — SODIUM CHLORIDE 9 MG/ML
INJECTION, SOLUTION INTRAVENOUS PRN
Status: DISCONTINUED | OUTPATIENT
Start: 2023-09-27 | End: 2023-09-27

## 2023-09-27 RX ORDER — SODIUM CHLORIDE 0.9 % (FLUSH) 0.9 %
5-40 SYRINGE (ML) INJECTION EVERY 12 HOURS SCHEDULED
Status: DISCONTINUED | OUTPATIENT
Start: 2023-09-27 | End: 2023-10-05 | Stop reason: HOSPADM

## 2023-09-27 RX ORDER — TRAMADOL HYDROCHLORIDE 50 MG/1
50 TABLET ORAL 2 TIMES DAILY
Status: ON HOLD | COMMUNITY
End: 2023-10-01 | Stop reason: HOSPADM

## 2023-09-27 RX ORDER — BUPIVACAINE HYDROCHLORIDE 5 MG/ML
INJECTION, SOLUTION EPIDURAL; INTRACAUDAL
Status: COMPLETED | OUTPATIENT
Start: 2023-09-27 | End: 2023-09-27

## 2023-09-27 RX ORDER — SODIUM CHLORIDE 9 MG/ML
INJECTION, SOLUTION INTRAVENOUS CONTINUOUS
Status: DISCONTINUED | OUTPATIENT
Start: 2023-09-27 | End: 2023-10-02

## 2023-09-27 RX ORDER — SODIUM CHLORIDE 0.9 % (FLUSH) 0.9 %
5-40 SYRINGE (ML) INJECTION PRN
Status: DISCONTINUED | OUTPATIENT
Start: 2023-09-27 | End: 2023-10-05 | Stop reason: HOSPADM

## 2023-09-27 RX ORDER — SODIUM CHLORIDE 0.9 % (FLUSH) 0.9 %
5-40 SYRINGE (ML) INJECTION EVERY 12 HOURS SCHEDULED
Status: DISCONTINUED | OUTPATIENT
Start: 2023-09-27 | End: 2023-09-27 | Stop reason: HOSPADM

## 2023-09-27 RX ORDER — SODIUM CHLORIDE 9 MG/ML
INJECTION, SOLUTION INTRAVENOUS PRN
Status: DISCONTINUED | OUTPATIENT
Start: 2023-09-27 | End: 2023-10-05 | Stop reason: HOSPADM

## 2023-09-27 RX ORDER — FENTANYL CITRATE 0.05 MG/ML
25 INJECTION, SOLUTION INTRAMUSCULAR; INTRAVENOUS EVERY 5 MIN PRN
Status: DISCONTINUED | OUTPATIENT
Start: 2023-09-27 | End: 2023-09-27 | Stop reason: HOSPADM

## 2023-09-27 RX ORDER — MIDODRINE HYDROCHLORIDE 10 MG/1
10 TABLET ORAL ONCE
Status: COMPLETED | OUTPATIENT
Start: 2023-09-27 | End: 2023-09-27

## 2023-09-27 RX ORDER — HEPARIN SODIUM 5000 [USP'U]/ML
5000 INJECTION, SOLUTION INTRAVENOUS; SUBCUTANEOUS EVERY 8 HOURS SCHEDULED
Status: DISCONTINUED | OUTPATIENT
Start: 2023-09-27 | End: 2023-09-27

## 2023-09-27 RX ORDER — LIDOCAINE HYDROCHLORIDE 20 MG/ML
INJECTION, SOLUTION EPIDURAL; INFILTRATION; INTRACAUDAL; PERINEURAL PRN
Status: DISCONTINUED | OUTPATIENT
Start: 2023-09-27 | End: 2023-09-27 | Stop reason: SDUPTHER

## 2023-09-27 RX ORDER — TRAMADOL HYDROCHLORIDE 50 MG/1
50 TABLET ORAL EVERY 6 HOURS PRN
Status: ON HOLD | COMMUNITY
End: 2023-10-01 | Stop reason: HOSPADM

## 2023-09-27 RX ORDER — 0.9 % SODIUM CHLORIDE 0.9 %
1000 INTRAVENOUS SOLUTION INTRAVENOUS ONCE
Status: COMPLETED | OUTPATIENT
Start: 2023-09-27 | End: 2023-09-27

## 2023-09-27 RX ORDER — FENTANYL CITRATE 0.05 MG/ML
50 INJECTION, SOLUTION INTRAMUSCULAR; INTRAVENOUS EVERY 5 MIN PRN
Status: DISCONTINUED | OUTPATIENT
Start: 2023-09-27 | End: 2023-09-27 | Stop reason: HOSPADM

## 2023-09-27 RX ORDER — MIDODRINE HYDROCHLORIDE 5 MG/1
5 TABLET ORAL
Status: DISCONTINUED | OUTPATIENT
Start: 2023-09-27 | End: 2023-09-28

## 2023-09-27 RX ORDER — ONDANSETRON 2 MG/ML
INJECTION INTRAMUSCULAR; INTRAVENOUS PRN
Status: DISCONTINUED | OUTPATIENT
Start: 2023-09-27 | End: 2023-09-27 | Stop reason: SDUPTHER

## 2023-09-27 RX ORDER — TRAMADOL HYDROCHLORIDE 50 MG/1
50 TABLET ORAL EVERY 12 HOURS PRN
Status: DISCONTINUED | OUTPATIENT
Start: 2023-09-27 | End: 2023-10-05 | Stop reason: HOSPADM

## 2023-09-27 RX ORDER — ROCURONIUM BROMIDE 10 MG/ML
INJECTION, SOLUTION INTRAVENOUS PRN
Status: DISCONTINUED | OUTPATIENT
Start: 2023-09-27 | End: 2023-09-27 | Stop reason: SDUPTHER

## 2023-09-27 RX ORDER — HYDROXYZINE HYDROCHLORIDE 10 MG/1
10 TABLET, FILM COATED ORAL EVERY 8 HOURS PRN
Status: DISCONTINUED | OUTPATIENT
Start: 2023-09-27 | End: 2023-10-05 | Stop reason: HOSPADM

## 2023-09-27 RX ORDER — FENTANYL CITRATE 50 UG/ML
INJECTION, SOLUTION INTRAMUSCULAR; INTRAVENOUS PRN
Status: DISCONTINUED | OUTPATIENT
Start: 2023-09-27 | End: 2023-09-27 | Stop reason: SDUPTHER

## 2023-09-27 RX ORDER — TRAMADOL HYDROCHLORIDE 50 MG/1
100 TABLET ORAL EVERY 12 HOURS PRN
Status: DISCONTINUED | OUTPATIENT
Start: 2023-09-27 | End: 2023-10-05 | Stop reason: HOSPADM

## 2023-09-27 RX ORDER — SODIUM CHLORIDE 9 MG/ML
INJECTION, SOLUTION INTRAVENOUS CONTINUOUS
Status: DISCONTINUED | OUTPATIENT
Start: 2023-09-27 | End: 2023-09-27

## 2023-09-27 RX ORDER — SODIUM CHLORIDE 0.9 % (FLUSH) 0.9 %
5-40 SYRINGE (ML) INJECTION PRN
Status: DISCONTINUED | OUTPATIENT
Start: 2023-09-27 | End: 2023-09-27 | Stop reason: HOSPADM

## 2023-09-27 RX ORDER — PROPOFOL 10 MG/ML
INJECTION, EMULSION INTRAVENOUS PRN
Status: DISCONTINUED | OUTPATIENT
Start: 2023-09-27 | End: 2023-09-27 | Stop reason: SDUPTHER

## 2023-09-27 RX ORDER — ALBUMIN, HUMAN INJ 5% 5 %
SOLUTION INTRAVENOUS PRN
Status: DISCONTINUED | OUTPATIENT
Start: 2023-09-27 | End: 2023-09-27 | Stop reason: SDUPTHER

## 2023-09-27 RX ORDER — ENOXAPARIN SODIUM 100 MG/ML
40 INJECTION SUBCUTANEOUS DAILY
Status: DISCONTINUED | OUTPATIENT
Start: 2023-09-28 | End: 2023-09-28

## 2023-09-27 RX ORDER — ONDANSETRON 2 MG/ML
4 INJECTION INTRAMUSCULAR; INTRAVENOUS
Status: DISCONTINUED | OUTPATIENT
Start: 2023-09-27 | End: 2023-09-27 | Stop reason: HOSPADM

## 2023-09-27 RX ORDER — MAGNESIUM HYDROXIDE 1200 MG/15ML
LIQUID ORAL CONTINUOUS PRN
Status: COMPLETED | OUTPATIENT
Start: 2023-09-27 | End: 2023-09-27

## 2023-09-27 RX ORDER — SODIUM CHLORIDE 9 MG/ML
INJECTION, SOLUTION INTRAVENOUS PRN
Status: DISCONTINUED | OUTPATIENT
Start: 2023-09-27 | End: 2023-09-27 | Stop reason: HOSPADM

## 2023-09-27 RX ADMIN — SODIUM CHLORIDE: 9 INJECTION, SOLUTION INTRAVENOUS at 15:24

## 2023-09-27 RX ADMIN — PROPOFOL 40 MG: 10 INJECTION, EMULSION INTRAVENOUS at 15:29

## 2023-09-27 RX ADMIN — CEFAZOLIN 2000 MG: 2 INJECTION, POWDER, FOR SOLUTION INTRAMUSCULAR; INTRAVENOUS at 15:43

## 2023-09-27 RX ADMIN — SODIUM CHLORIDE: 9 INJECTION, SOLUTION INTRAVENOUS at 18:48

## 2023-09-27 RX ADMIN — SODIUM CHLORIDE 1000 ML: 9 INJECTION, SOLUTION INTRAVENOUS at 23:00

## 2023-09-27 RX ADMIN — PROPOFOL 30 MG: 10 INJECTION, EMULSION INTRAVENOUS at 15:31

## 2023-09-27 RX ADMIN — Medication 6 MG: at 19:56

## 2023-09-27 RX ADMIN — ROCURONIUM BROMIDE 50 MG: 10 INJECTION INTRAVENOUS at 15:31

## 2023-09-27 RX ADMIN — SODIUM CHLORIDE: 9 INJECTION, SOLUTION INTRAVENOUS at 19:54

## 2023-09-27 RX ADMIN — FENTANYL CITRATE 25 MCG: 50 INJECTION INTRAMUSCULAR; INTRAVENOUS at 15:29

## 2023-09-27 RX ADMIN — ONDANSETRON 4 MG: 2 INJECTION INTRAMUSCULAR; INTRAVENOUS at 16:17

## 2023-09-27 RX ADMIN — MIDODRINE HYDROCHLORIDE 10 MG: 10 TABLET ORAL at 19:56

## 2023-09-27 RX ADMIN — ALBUMIN (HUMAN) 12.5 G: 12.5 INJECTION, SOLUTION INTRAVENOUS at 16:06

## 2023-09-27 RX ADMIN — FENTANYL CITRATE 50 MCG: 50 INJECTION INTRAMUSCULAR; INTRAVENOUS at 15:52

## 2023-09-27 RX ADMIN — SUGAMMADEX 200 MG: 100 INJECTION, SOLUTION INTRAVENOUS at 16:56

## 2023-09-27 RX ADMIN — BUSPIRONE HYDROCHLORIDE 10 MG: 10 TABLET ORAL at 19:59

## 2023-09-27 RX ADMIN — SODIUM CHLORIDE: 9 INJECTION, SOLUTION INTRAVENOUS at 22:40

## 2023-09-27 RX ADMIN — MIDODRINE HYDROCHLORIDE 5 MG: 5 TABLET ORAL at 11:19

## 2023-09-27 RX ADMIN — LIDOCAINE HYDROCHLORIDE 80 MG: 20 INJECTION, SOLUTION EPIDURAL; INFILTRATION; INTRACAUDAL; PERINEURAL at 15:29

## 2023-09-27 RX ADMIN — RISPERIDONE 1 MG: 1 TABLET ORAL at 19:59

## 2023-09-27 RX ADMIN — MIDODRINE HYDROCHLORIDE 5 MG: 5 TABLET ORAL at 18:47

## 2023-09-27 RX ADMIN — ACETAMINOPHEN 650 MG: 325 TABLET ORAL at 18:47

## 2023-09-27 RX ADMIN — FENTANYL CITRATE 25 MCG: 50 INJECTION INTRAMUSCULAR; INTRAVENOUS at 15:46

## 2023-09-27 RX ADMIN — SERTRALINE 50 MG: 50 TABLET, FILM COATED ORAL at 19:59

## 2023-09-27 ASSESSMENT — PAIN SCALES - PAIN ASSESSMENT IN ADVANCED DEMENTIA (PAINAD)
CONSOLABILITY: 0
NEGVOCALIZATION: 0
FACIALEXPRESSION: 0
CONSOLABILITY: 0
FACIALEXPRESSION: 0
BODYLANGUAGE: 0
BREATHING: 0
NEGVOCALIZATION: 0
BODYLANGUAGE: 0
TOTALSCORE: 0
TOTALSCORE: 0
BODYLANGUAGE: 0
TOTALSCORE: 0
BREATHING: 0
NEGVOCALIZATION: 0
BREATHING: 0
CONSOLABILITY: 0
FACIALEXPRESSION: 0

## 2023-09-27 ASSESSMENT — PAIN - FUNCTIONAL ASSESSMENT: PAIN_FUNCTIONAL_ASSESSMENT: NONE - DENIES PAIN

## 2023-09-27 ASSESSMENT — PAIN SCALES - GENERAL: PAINLEVEL_OUTOF10: 0

## 2023-09-27 ASSESSMENT — ENCOUNTER SYMPTOMS: SHORTNESS OF BREATH: 0
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
## 2023-09-27 NOTE — ED NOTES
Report called to Simon Hicks for Rm 21 . Questions addressed, report accepted. Pt to be transported when available.       Paola Santos RN  09/26/23 5623

## 2023-09-27 NOTE — PROGRESS NOTES
Lab at bedside. Report called to Amanda HOROWITZ on 3W. All questions answered. Pt to room 3123 via bed when transport available.

## 2023-09-27 NOTE — ANESTHESIA PRE PROCEDURE
Department of Anesthesiology  Preprocedure Note       Name:  Siri Gonzalez   Age:  80 y.o.  :  1935                                          MRN:  [id number]         Date:  2023      Surgeon: Kishor Espinoza):  Lisa Strong MD    Procedure: Procedure(s):  OPEN REDUCTION INTERNAL FIXATION LEFT FEMUR    Medications prior to admission:   Prior to Admission medications    Medication Sig Start Date End Date Taking? Authorizing Provider   dextran 70-hypromellose (TEARS NATURALE) 0.1-0.3 % SOLN opthalmic solution Place 1 drop into both eyes every 4 hours as needed (xerophthalmia)   Yes Historical Provider, MD   traMADol (ULTRAM) 50 MG tablet Take 1 tablet by mouth in the morning and at bedtime. Yes Historical Provider, MD   traMADol (ULTRAM) 50 MG tablet Take 1 tablet by mouth every 6 hours as needed for Pain.    Yes Historical Provider, MD   sertraline (ZOLOFT) 50 MG tablet Take 1 tablet by mouth at bedtime   Yes Historical Provider, MD   aspirin 81 MG EC tablet Take 1 tablet by mouth daily    Historical Provider, MD   busPIRone (BUSPAR) 5 MG tablet Take 2 tablets by mouth 3 times daily    Historical Provider, MD   [facility]) OINT ointment Apply 1 each topically as needed (dryness)    Historical Provider, MD   estradiol (ESTRACE) 0.1 MG/GM vaginal cream Place 1 g vaginally once a week    Historical Provider, MD   furosemide (LASIX) 20 MG tablet Take 2.5 tablets by mouth daily    Historical Provider, MD   loperamide (IMODIUM) 2 MG capsule Take 1 capsule by mouth every 4 hours as needed for Diarrhea    Historical Provider, MD   omeprazole (PRILOSEC) 40 MG delayed release capsule Take 1 capsule by mouth daily    Historical Provider, MD   potassium chloride (KLOR-CON M) 20 MEQ extended release tablet Take 1 tablet by mouth 2 times daily    Historical Provider, MD   risperiDONE (RISPERDAL) 1 MG tablet Take 1 tablet by mouth at bedtime    Historical Provider, MD   nystatin (MYCOSTATIN) [id number]

## 2023-09-27 NOTE — PROGRESS NOTES
Notified Dr. Bandar Rachel of pt's H/H this am of 7.1/20.7. Also notified of pt not having any urine output since arrival to the unit from the ER [facility]. Bladder scan noted to be 179 mL.  states blood will need to be preop if she is having surgery today and if no surgery today, then no need for blood unless Hgb drops below 7. No new orders for urine output. Will pass on to day shift nurse.

## 2023-09-27 NOTE — PROGRESS NOTES
Consent obtained for IM nailing via daughter Sidney Martinez, placed in chart. VS WNL for pt, blood transfusion completed at approx 1340 with no s/s of adverse reactions, pt appears to have tolerated well. Pt has working iv access, daughter going with pt to periop, gown changed and dentures removed, left in room.

## 2023-09-27 NOTE — PROGRESS NOTES
4 Eyes Skin Assessment     NAME:  Jess Brand  YOB: 1935  MEDICAL RECORD NUMBER:  [id number]    The patient is being assessed for  Admission    I agree that at least one RN has performed a thorough Head to Toe Skin Assessment on the patient. ALL assessment sites listed below have been assessed. Areas assessed by both nurses:    Head, Face, Ears, Shoulders, Back, Chest, Arms, Elbows, Hands, Sacrum. Buttock, Coccyx, Ischium, Legs. Feet and Heels, and Under Medical Devices         Does the Patient have a Wound?  No noted wound(s)       Cr Prevention initiated by RN: Yes  Wound Care Orders initiated by RN: No    Pressure Injury (Stage 3,4, Unstageable, DTI, NWPT, and Complex wounds) if present, place Wound referral order by RN under : No    New Ostomies, if present place, Ostomy referral order under : No     Nurse 1 eSignature: Electronically signed by Yari Sullivan RN on 9/27/23 at 2:16 AM EDT    **SHARE this note so that the co-signing nurse can place an eSignature**    Nurse 2 eSignature: Electronically signed by Zuleima Bernabe RN on 9/27/23 at 7:50 AM EDT

## 2023-09-27 NOTE — PROGRESS NOTES
Bedside shift change report given to Amanda (oncoming nurse) by Debo Bartlett (offgoing nurse). Report included the following information Nurse Handoff Report.

## 2023-09-27 NOTE — PROGRESS NOTES
[facility] ALCIRA Scanner Results  Pt was scanned according to 's recommendations. Admission      Site Reading Redness noted? Y/N   Right heel 0.8 Blanchable redness   Left heel  0.5 Blanchable redness   Sacrum 0.7 Blanchable redness       [x]  ALCIRA Delta score < 0.6 indicates normal, healthy tissue at this time. Continue to assess daily and implement routine pressure injury prevention measures using the Cr Order Set. Scan weekly on Wednesday. [x] ALCIRA Delta score > or = to 0.6 indicates at risk tissue. Implement target prevention interventions below and scan daily.     [x] Cr orders reviewed and updated if indicated  [x] Educated patient/family on importance of offloading/repositioning  [x] Patient agreeable to plan for pressure offloading/repositioning    Liquid barrier film wipes, Extra pillows, and Heel boot(s)      [] Nutrition consult made  [x] Nutrition consult not indicated at this time     [x] Sacral foam border in place  [] Sacral foam border not in place, barrier cream applied    [x] Low air loss mattress (or Isoflex blue/orange mattress) ordered/placed   [x] For heels, apply liquid skin barrier twice daily and ensure offloading with pillows or boots     Electronically signed by Samy Valdez RN on 9/27/2023 at 2:23 AM
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
## 2023-09-27 NOTE — PROGRESS NOTES
When applying ice pack to surgical site. New skin tear was seen on LFA. Applied dressing and new LDA in place.

## 2023-09-27 NOTE — PROGRESS NOTES
Pt resting comfortably with eyes closed. VSS on 2L NC. When awakened, pt responds with Yes to all questions, unable to follow commands like wiggling toes or fingers. New surgical dressing clean, dry and intact, lower dressing dry and intact with no new breakthrough drainage.

## 2023-09-27 NOTE — ANESTHESIA POSTPROCEDURE EVALUATION
Department of Anesthesiology  Postprocedure Note    Patient: Moises Sullivan  MRN: [id number]  YOB: 1935  Date of evaluation: 9/27/2023      Procedure Summary     Date: 09/27/23 Room / Location: [address]    Anesthesia Start: 9253 Anesthesia Stop: 8800    Procedure: OPEN REDUCTION INTERNAL FIXATION LEFT FEMUR intramedullary nailing (Left: Leg Upper) Diagnosis:       Closed fracture of left hip, initial encounter ([address])      (Closed fracture of left hip, initial encounter ([address]) Jackson Welch)    Surgeons: Kobe Flores MD Responsible Provider: Kenton Vaughn MD    Anesthesia Type: general ASA Status: 3          Anesthesia Type: No value filed.     Leon Phase I: Leon Score: 8    Leon Phase II:        Anesthesia Post Evaluation    Patient location during evaluation: PACU  Level of consciousness: awake  Airway patency: patent  Nausea & Vomiting: no nausea and no vomiting  Complications: no  Cardiovascular status: hemodynamically stable  Respiratory status: acceptable  Hydration status: stable  Pain management: adequate

## 2023-09-27 NOTE — PROGRESS NOTES
Patient to PACU from OR. Pt awakens easily on arrival. VS stable (see flowsheet) on 2L NC. Per OR RN. Pt has new skin tear on L cheek due to mask during surgery. Dressing in place. Clean, dry and intact. Surgical dressing bloody, OR changed dressing on arrival. Pt unable to follow commands. Pt drowsy.

## 2023-09-27 NOTE — CONSULTS
[address], [address]                                  CONSULTATION    PATIENT NAME: Magy Kennedy                 :        1935  MED REC NO:   [id number]                          ROOM:       0604  ACCOUNT NO:   [de-identified]                           ADMIT DATE: 2023  PROVIDER:     Lisa Suarez MD    CONSULT DATE:  2023    REASON FOR CONSULTATION:  Acute kidney injury. HISTORY OF PRESENTING ILLNESS:  She is an 80-year-old female with past  medical history significant for dementia, anxiety disorder,  hypertension, GERD, chronic kidney disease, brought to ER after having a  fall. The patient was found to have a close comminuted displaced left  intratrochanteric fracture. The patient was admitted for further workup  and management. Orthopedic consults were called for surgical  intervention. Renal consultation has been called for acute kidney  injury. At the time of consultation, the patient was confused, disoriented, and  resting. HPI is limited due to the patient's factor. PAST MEDICAL HISTORY:  1. Chronic kidney disease stage IIIA. 2.  Dementia. 3.  Hypertension. 4.  Anemia of chronic disease. 5.  Anxiety disorder. 6.  Chronic back pain. PAST SURGICAL HISTORY:  Per record, she is status post left foot bunion  surgery. SOCIAL HISTORY:  Lives in a nursing home. Former smoker per records. MEDICATIONS:  Potassium, Tylenol, aspirin, BuSpar, melatonin, Lopressor,  Protonix, and Risperidone. ALLERGIES:  LATEX and OXYCODONE. REVIEW OF SYSTEMS:  Unobtainable. PHYSICAL EXAMINATION:  GENERAL:  She is lying in bed, looks comfortable. VITAL SIGNS:  Blood pressure 96/40, pulse 81, temperature 98. 3. HEENT:  Pupils reactive to light. CHEST:  Decreased breath sounds both bases. CARDIOVASCULAR:  S1 and S2 audible. Regular rate and rhythm. ABDOMEN:  Soft, nontender.   Positive
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
## 2023-09-27 NOTE — PROGRESS NOTES
Pt from PACU at this time, VS WNL For pt pt on 2 L NC due to being drowsy. Pt small old drainage to hip dressing, pedal pulse present, pt able to follow commands. Orders released, switched to specialty bed for skin, dinner ordered for pt and breakfast with families wishes. Pt resting in bed, fall precautions in place, call light within reach.

## 2023-09-27 NOTE — PROGRESS NOTES
Pharmacy Medication Reconciliation Note     List of medications patient is currently taking is complete. Allergies:  Latex, Oxycodone, and Strawberry extract    Source of information:   1. Order summary from [facility]    Notes regarding home medications:   1. Updated med list to match list provided by NH  2.  Most notably added Zoloft and tramadol    Denies any other OTC/herbal meds    Adrian Simpson PharmD  9/27/2023  3:40 AM

## 2023-09-27 NOTE — PROGRESS NOTES
Patient was admitted at 2200 to room 3123 via stretcher. Pt oriented to room, call light, policies and procedures, the menu and ordering. Call light within reach. Bed in lowest position, bed alarm on, and wheels locked. Pt verbalized understanding. No complaints, questions, or concerns at this time.       Electronically signed by Oneyda Pulliam RN on 9/27/2023 at 2:11 AM

## 2023-09-27 NOTE — PLAN OF CARE
Problem: Discharge Planning  Goal: Discharge to home or other facility with appropriate resources  Outcome: Progressing  Flowsheets  Taken 9/27/2023 0228  Discharge to home or other facility with appropriate resources:   Identify barriers to discharge with patient and caregiver   Identify discharge learning needs (meds, wound care, etc)   Arrange for needed discharge resources and transportation as appropriate  Taken 9/26/2023 2232  Discharge to home or other facility with appropriate resources:   Identify barriers to discharge with patient and caregiver   Arrange for needed discharge resources and transportation as appropriate   Identify discharge learning needs (meds, wound care, etc)     Problem: Safety - Adult  Goal: Free from fall injury  Outcome: Progressing  Flowsheets (Taken 9/27/2023 0544)  Free From Fall Injury: Instruct family/caregiver on patient safety     Problem: Pain  Goal: Verbalizes/displays adequate comfort level or baseline comfort level  Outcome: Progressing  Flowsheets (Taken 9/27/2023 0544)  Verbalizes/displays adequate comfort level or baseline comfort level:   Encourage patient to monitor pain and request assistance   Assess pain using appropriate pain scale   Administer analgesics based on type and severity of pain and evaluate response   Implement non-pharmacological measures as appropriate and evaluate response     Problem: Skin/Tissue Integrity  Goal: Absence of new skin breakdown  Description: 1. Monitor for areas of redness and/or skin breakdown  2. Assess vascular access sites hourly  3. Every 4-6 hours minimum:  Change oxygen saturation probe site  4. Every 4-6 hours:  If on nasal continuous positive airway pressure, respiratory therapy assess nares and determine need for appliance change or resting period.   Outcome: Progressing   Electronically signed by Michael Ruiz RN on 9/27/2023 at 5:44 AM

## 2023-09-28 ENCOUNTER — APPOINTMENT (OUTPATIENT)
Dept: GENERAL RADIOLOGY | Age: 88
End: 2023-09-28
Payer: COMMERCIAL

## 2023-09-28 LAB
ALBUMIN SERPL-MCNC: 2.6 G/DL (ref 3.4–5)
ANION GAP SERPL CALCULATED.3IONS-SCNC: 10 MMOL/L (ref 3–16)
BLOOD BANK DISPENSE STATUS: NORMAL
BLOOD BANK PRODUCT CODE: NORMAL
BPU ID: NORMAL
BUN SERPL-MCNC: 57 MG/DL (ref 7–20)
CALCIUM SERPL-MCNC: 6.9 MG/DL (ref 8.3–10.6)
CHLORIDE SERPL-SCNC: 103 MMOL/L (ref 99–110)
CO2 SERPL-SCNC: 23 MMOL/L (ref 21–32)
CREAT SERPL-MCNC: 3.8 MG/DL (ref 0.6–1.2)
DEPRECATED RDW RBC AUTO: 14.2 % (ref 12.4–15.4)
DESCRIPTION BLOOD BANK: NORMAL
FERRITIN SERPL IA-MCNC: 574 NG/ML (ref 15–150)
GFR SERPLBLD CREATININE-BSD FMLA CKD-EPI: 11 ML/MIN/{1.73_M2}
GLUCOSE SERPL-MCNC: 144 MG/DL (ref 70–99)
HCT VFR BLD AUTO: 16 % (ref 36–48)
HCT VFR BLD AUTO: 23 % (ref 36–48)
HCT VFR BLD AUTO: 23.3 % (ref 36–48)
HGB BLD-MCNC: 5.6 G/DL (ref 12–16)
HGB BLD-MCNC: 8 G/DL (ref 12–16)
HGB BLD-MCNC: 8.1 G/DL (ref 12–16)
IRON SATN MFR SERPL: 14 % (ref 15–50)
IRON SERPL-MCNC: 19 UG/DL (ref 37–145)
MCH RBC QN AUTO: 32.2 PG (ref 26–34)
MCHC RBC AUTO-ENTMCNC: 34.9 G/DL (ref 31–36)
MCV RBC AUTO: 92.3 FL (ref 80–100)
PHOSPHATE SERPL-MCNC: 5.3 MG/DL (ref 2.5–4.9)
PLATELET # BLD AUTO: 122 K/UL (ref 135–450)
PMV BLD AUTO: 9 FL (ref 5–10.5)
POTASSIUM SERPL-SCNC: 5.1 MMOL/L (ref 3.5–5.1)
RBC # BLD AUTO: 1.74 M/UL (ref 4–5.2)
SODIUM SERPL-SCNC: 136 MMOL/L (ref 136–145)
TIBC SERPL-MCNC: 139 UG/DL (ref 260–445)
WBC # BLD AUTO: 7.7 K/UL (ref 4–11)

## 2023-09-28 PROCEDURE — 85027 COMPLETE CBC AUTOMATED: CPT

## 2023-09-28 PROCEDURE — 6370000000 HC RX 637 (ALT 250 FOR IP): Performed by: ORTHOPAEDIC SURGERY

## 2023-09-28 PROCEDURE — 83550 IRON BINDING TEST: CPT

## 2023-09-28 PROCEDURE — 80069 RENAL FUNCTION PANEL: CPT

## 2023-09-28 PROCEDURE — 36415 COLL VENOUS BLD VENIPUNCTURE: CPT

## 2023-09-28 PROCEDURE — 82728 ASSAY OF FERRITIN: CPT

## 2023-09-28 PROCEDURE — 36430 TRANSFUSION BLD/BLD COMPNT: CPT

## 2023-09-28 PROCEDURE — 6360000002 HC RX W HCPCS: Performed by: INTERNAL MEDICINE

## 2023-09-28 PROCEDURE — 2580000003 HC RX 258: Performed by: ORTHOPAEDIC SURGERY

## 2023-09-28 PROCEDURE — 2580000003 HC RX 258: Performed by: INTERNAL MEDICINE

## 2023-09-28 PROCEDURE — 9990000010 HC NO CHARGE VISIT

## 2023-09-28 PROCEDURE — 1200000000 HC SEMI PRIVATE

## 2023-09-28 PROCEDURE — 6370000000 HC RX 637 (ALT 250 FOR IP): Performed by: INTERNAL MEDICINE

## 2023-09-28 PROCEDURE — 73610 X-RAY EXAM OF ANKLE: CPT

## 2023-09-28 PROCEDURE — 83540 ASSAY OF IRON: CPT

## 2023-09-28 PROCEDURE — 94760 N-INVAS EAR/PLS OXIMETRY 1: CPT

## 2023-09-28 PROCEDURE — 51798 US URINE CAPACITY MEASURE: CPT

## 2023-09-28 PROCEDURE — 85018 HEMOGLOBIN: CPT

## 2023-09-28 PROCEDURE — 6360000002 HC RX W HCPCS: Performed by: ORTHOPAEDIC SURGERY

## 2023-09-28 PROCEDURE — 85014 HEMATOCRIT: CPT

## 2023-09-28 RX ORDER — HEPARIN SODIUM 5000 [USP'U]/ML
5000 INJECTION, SOLUTION INTRAVENOUS; SUBCUTANEOUS EVERY 8 HOURS SCHEDULED
Status: DISCONTINUED | OUTPATIENT
Start: 2023-09-29 | End: 2023-10-05 | Stop reason: HOSPADM

## 2023-09-28 RX ORDER — CASTOR OIL AND BALSAM, PERU 788; 87 MG/G; MG/G
OINTMENT TOPICAL 2 TIMES DAILY
Status: DISCONTINUED | OUTPATIENT
Start: 2023-09-28 | End: 2023-10-05 | Stop reason: HOSPADM

## 2023-09-28 RX ORDER — MIDODRINE HYDROCHLORIDE 10 MG/1
10 TABLET ORAL
Status: DISCONTINUED | OUTPATIENT
Start: 2023-09-28 | End: 2023-10-01

## 2023-09-28 RX ORDER — SODIUM CHLORIDE 9 MG/ML
INJECTION, SOLUTION INTRAVENOUS PRN
Status: DISCONTINUED | OUTPATIENT
Start: 2023-09-28 | End: 2023-10-05 | Stop reason: HOSPADM

## 2023-09-28 RX ORDER — FUROSEMIDE 10 MG/ML
20 INJECTION INTRAMUSCULAR; INTRAVENOUS ONCE
Status: COMPLETED | OUTPATIENT
Start: 2023-09-28 | End: 2023-09-28

## 2023-09-28 RX ADMIN — FUROSEMIDE 20 MG: 10 INJECTION, SOLUTION INTRAMUSCULAR; INTRAVENOUS at 13:23

## 2023-09-28 RX ADMIN — ACETAMINOPHEN 650 MG: 325 TABLET ORAL at 23:52

## 2023-09-28 RX ADMIN — CEFAZOLIN 2000 MG: 2 INJECTION, POWDER, FOR SOLUTION INTRAMUSCULAR; INTRAVENOUS at 09:18

## 2023-09-28 RX ADMIN — Medication 6 MG: at 20:04

## 2023-09-28 RX ADMIN — BUSPIRONE HYDROCHLORIDE 10 MG: 10 TABLET ORAL at 13:21

## 2023-09-28 RX ADMIN — MIDODRINE HYDROCHLORIDE 10 MG: 10 TABLET ORAL at 16:40

## 2023-09-28 RX ADMIN — SODIUM CHLORIDE, PRESERVATIVE FREE 10 ML: 5 INJECTION INTRAVENOUS at 20:05

## 2023-09-28 RX ADMIN — SODIUM CHLORIDE: 9 INJECTION, SOLUTION INTRAVENOUS at 20:49

## 2023-09-28 RX ADMIN — ACETAMINOPHEN 650 MG: 325 TABLET ORAL at 13:22

## 2023-09-28 RX ADMIN — MIDODRINE HYDROCHLORIDE 5 MG: 5 TABLET ORAL at 11:05

## 2023-09-28 RX ADMIN — ACETAMINOPHEN 650 MG: 325 TABLET ORAL at 16:40

## 2023-09-28 RX ADMIN — SERTRALINE 50 MG: 50 TABLET, FILM COATED ORAL at 20:05

## 2023-09-28 RX ADMIN — PANTOPRAZOLE SODIUM 40 MG: 40 TABLET, DELAYED RELEASE ORAL at 05:22

## 2023-09-28 RX ADMIN — ACETAMINOPHEN 650 MG: 325 TABLET ORAL at 00:24

## 2023-09-28 RX ADMIN — ACETAMINOPHEN 650 MG: 325 TABLET ORAL at 05:22

## 2023-09-28 RX ADMIN — CASTOR OIL AND BALSAM, PERU: 788; 87 OINTMENT TOPICAL at 23:54

## 2023-09-28 RX ADMIN — CEFAZOLIN 2000 MG: 2 INJECTION, POWDER, FOR SOLUTION INTRAMUSCULAR; INTRAVENOUS at 00:22

## 2023-09-28 RX ADMIN — TRAMADOL HYDROCHLORIDE 100 MG: 50 TABLET ORAL at 10:22

## 2023-09-28 RX ADMIN — RISPERIDONE 1 MG: 1 TABLET ORAL at 20:05

## 2023-09-28 RX ADMIN — BUSPIRONE HYDROCHLORIDE 10 MG: 10 TABLET ORAL at 20:05

## 2023-09-28 ASSESSMENT — PAIN - FUNCTIONAL ASSESSMENT: PAIN_FUNCTIONAL_ASSESSMENT: PREVENTS OR INTERFERES SOME ACTIVE ACTIVITIES AND ADLS

## 2023-09-28 ASSESSMENT — PAIN SCALES - GENERAL
PAINLEVEL_OUTOF10: 0
PAINLEVEL_OUTOF10: 0
PAINLEVEL_OUTOF10: 10
PAINLEVEL_OUTOF10: 0
PAINLEVEL_OUTOF10: 0

## 2023-09-28 ASSESSMENT — PAIN SCALES - PAIN ASSESSMENT IN ADVANCED DEMENTIA (PAINAD)
TOTALSCORE: 0
BODYLANGUAGE: 0
FACIALEXPRESSION: 0
BREATHING: 0
NEGVOCALIZATION: 0
TOTALSCORE: 0
BODYLANGUAGE: 0
NEGVOCALIZATION: 0
CONSOLABILITY: 0
FACIALEXPRESSION: 0
CONSOLABILITY: 0
BREATHING: 0

## 2023-09-28 ASSESSMENT — PAIN DESCRIPTION - DESCRIPTORS: DESCRIPTORS: ACHING;SHARP

## 2023-09-28 ASSESSMENT — PAIN DESCRIPTION - ORIENTATION: ORIENTATION: LEFT

## 2023-09-28 ASSESSMENT — PAIN DESCRIPTION - LOCATION: LOCATION: HIP

## 2023-09-28 NOTE — PROGRESS NOTES
Physical Therapy    Status Note  Keena Paredes  [id number]  840    Therapy order noted and chart reviewed, post repair of femur fracture. Patient to receive unit of PRBCs for Hg 5.6. Will follow and see as patient able to participate.     Electronically signed by Harpal Barrett, [address] [place] [address] [phone])  on 9/28/2023 at 8:46 AM

## 2023-09-28 NOTE — PROGRESS NOTES
Patient continues to be turned Q2 but laying on L side to promote pressure on wound that is bleeding. Dressings saturated - will change per order. Wound care contacted for advice regarding prevention of skin breakdown, will notify MD and possible obtain new order.      Electronically signed by Osiel Brown RN on 9/28/2023 at 4:10 PM Drysol Counseling:  I discussed with the patient the risks of drysol/aluminum chloride including but not limited to skin rash, itching, irritation, burning.

## 2023-09-28 NOTE — PROGRESS NOTES
Occupational Therapy    09/28/23    Cheko Brand  1935  [id number]    OT orders noted. Patient chart reviewed. Pt with critically low Hemoglobin and transfusion ordered. Will hold therapy eval at this time and re-attempt this afternoon if appropriate. Electronically signed by Marylene Hylan, OTD, OTR/L on 9/28/2023 at 7:51 AM      [phone] Update  Spoke with Shefali Monroe RN regarding pt's status. RN requesting hold therapy the rest of this date d/t various factors- see note at 91 21 06 from Shefali Monroe. Will re-attempt tomorrow if appropriate.     Electronically signed by Marylene Hylan, OTD, OTR/L on 9/28/2023 at 1:06 PM
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
## 2023-09-28 NOTE — PROGRESS NOTES
Patient infusing 1st unit of PRBC this morning when this RN started the shift (see flowsheets) - BP this morning was stable at 137/62 so morning midodrine was held and MD was updated. At 0915 patient BP was still stable at 124/59. Second unit of PRBC was started around 1030 (see flowsheets) - BP was 93/70 - RN was with patient for first 15 minutes and no signs of reaction was noted. BP after 15 minutes was 80/38 - MD Updated and lunch time midodrine was given. BP at 1100 was 88/47 and at 1115 BP was 108/52. Patient a little more alert at this time. Family at bedside and was updated on POC. Ortho NP Karen was also updated on patient status. Surgical site dressing is bloody but has not broken through dressing completely. Patient laying on left side with pillow under inc site to apply constant pressure on surgical site. Patient has not urinated yet this shift, bladder scan was done and showed 70ml of urine in bladder. During bladder scan it appears the hematoma is spreading in the upper L thigh and madeline area. NP Karne was updated about this. Nephro saw patient and added new orders (see orders) will be started as soon as 2unit of PRBC is completed. Message to MD [facility] asking for phone call to update on patient status. Family still at bedside and is continued to be updated - all questions and concerns at this time has been answered. Will continue with POC.      Electronically signed by Anoop Whatley RN on 9/28/2023 at 12:16 PM

## 2023-09-28 NOTE — CARE COORDINATION
Current Nursing Home Information:  Patient admitted from:  [facility]    Call to Renaldo [place] [facility], at [phone] who confirmed the patient is: [address], no Precert required for return. Patient Covid vaccination status:    Internal Administration   First Dose COVID-19, PFIZER PURPLE top, DILUTE for use, (age 15 y+), 30mcg/0.3mL  12/21/2020   Second Dose COVID-19, PFIZER PURPLE top, DILUTE for use, (age 15 y+), 30mcg/0.3mL   01/11/2021       Last COVID Lab POC Creatinine (mg/dL)   Date Value   09/13/2021 1.3 (H)     POC Glucose (mg/dl)   Date Value   09/27/2023 166 (H)     Sample Type (no units)   Date Value   09/13/2021 GABRIEL            Covid Test requirement for return: No Rapid/PCR Covid test needed before return    Met with daughter Marietta Gilliam who confirmed plans to return to [facility] where patient has been a long term care resident on the memory unit since 2017. Discussed hospice services as daughter states dialysis was mentioned but patient would not want that.

## 2023-09-28 NOTE — PROGRESS NOTES
Both L hip dressings were changed, during changing both site were no longer bleeding. New ABD with Tegaderm applied to upper L leg and patient tolerated well. Silver dressing applied to lower L Thigh - no complications. All staples in both site are in place, new dressings are clean dry and intact. Will continue Q2 turns and new treatment added with Venelex. Electronically signed by Briana Alexis RN on 9/28/2023 at 5:04 PM    L lower thigh dressing was changed at around 1700 - at this time there is no drainage at the site. Patient upper L thigh dressing was also changed at around 1700 - bloody drainage continues but has significantly decreased throughout the day. NP from ortho and hospital ist were updated on the status of dressings, and the increase of swelling in L thigh. Patient midodrine was increased by nephrology. Patient oral intake has improved and able to take PO medication crushed in applesauce at this time. Patient BP since second unit of PRBC has been much more stable (see flowsheets). H+H after 2 unit of PRBC was 8.0 - MD Katie Silva was updated.      Electronically signed by Briana Alexis RN on 9/28/2023 at 7:56 PM

## 2023-09-28 NOTE — PROGRESS NOTES
Garcia was placed with 2RNs - no complications. 225ml of yellow urine returned.      Electronically signed by Xiomara Leo RN on 9/28/2023 at 1:27 PM

## 2023-09-28 NOTE — PROGRESS NOTES
Pt In bed resting quietly. Pt vitals were abnormal MD made aware. Pt denies having any pain at the moment. Safety measures are in place call light in reach. Will continue to monitor.  Electronically signed by Jared Graham RN on 9/27/2023 at 9:05 PM

## 2023-09-28 NOTE — PLAN OF CARE
Problem: Discharge Planning  Goal: Discharge to home or other facility with appropriate resources  Outcome: Progressing  Flowsheets (Taken 9/27/2023 2051)  Discharge to home or other facility with appropriate resources: Identify barriers to discharge with patient and caregiver     Problem: Safety - Adult  Goal: Free from fall injury  Outcome: Progressing  Flowsheets (Taken 9/27/2023 0544 by Laureano Fuentes RN)  Free From Fall Injury: Instruct family/caregiver on patient safety     Problem: Pain  Goal: Verbalizes/displays adequate comfort level or baseline comfort level  Outcome: Progressing  Flowsheets (Taken 9/27/2023 0544 by Laureano Fuentes RN)  Verbalizes/displays adequate comfort level or baseline comfort level:   Encourage patient to monitor pain and request assistance   Assess pain using appropriate pain scale   Administer analgesics based on type and severity of pain and evaluate response   Implement non-pharmacological measures as appropriate and evaluate response     Problem: Skin/Tissue Integrity  Goal: Absence of new skin breakdown  Description: 1. Monitor for areas of redness and/or skin breakdown  2. Assess vascular access sites hourly  3. Every 4-6 hours minimum:  Change oxygen saturation probe site  4. Every 4-6 hours:  If on nasal continuous positive airway pressure, respiratory therapy assess nares and determine need for appliance change or resting period.   Outcome: Progressing     Problem: ABCDS Injury Assessment  Goal: Absence of physical injury  Outcome: Progressing  Flowsheets (Taken 9/27/2023 2105)  Absence of Physical Injury: Implement safety measures based on patient assessment

## 2023-09-28 NOTE — PROGRESS NOTES
Notified by Bryan Roberts RN pt with low BP but he held Midodrine this AM for stable BP. He will issue the Midodrine now. PRBC in room to infuse. Pt is awake. RECORDS RECEIVED FROM: Internal   DATE RECEIVED: 1.11.23   NOTES (Gather within 2 years) STATUS DETAILS   OFFICE NOTE from referring provider   Internal 11.25.22 Dr. Lemus, [facility]    OFFICE NOTE from other specialist     DISCHARGE SUMMARY from hospital     DISCHARGE REPORT from the ER     LABS (any labs) Internal    MEDICATION LIST Internal    IMAGING  (NEED IMAGES AND REPORTS)     Osteomyelitis: Foot imaging      Liver Abscess: Abdominal imaging     Other (anything related to diagnoses

## 2023-09-28 NOTE — PROGRESS NOTES
This RN in to assist primary RN with straight cath. RN observed large amount of sanguinous drainage on bed pad. Patient had ORIF with Dr. Irven Siemens 9/27. Dressing to left hip copiously saturated, as well as bed pad underneath patient. Patient repositioned so this RN could view dressing better, proximal dressing falling off. Removed dressing and applied 8x4 guaze x3 and abd pad x1, covered with transparent dressing. Distal dressing with increased drainage per primary RN, but showed no signs of leaking through.

## 2023-09-29 ENCOUNTER — APPOINTMENT (OUTPATIENT)
Dept: GENERAL RADIOLOGY | Age: 88
End: 2023-09-29
Payer: COMMERCIAL

## 2023-09-29 LAB
ALBUMIN SERPL-MCNC: 2.4 G/DL (ref 3.4–5)
ANION GAP SERPL CALCULATED.3IONS-SCNC: 9 MMOL/L (ref 3–16)
BLOOD BANK DISPENSE STATUS: NORMAL
BLOOD BANK PRODUCT CODE: NORMAL
BPU ID: NORMAL
BUN SERPL-MCNC: 56 MG/DL (ref 7–20)
CA-I BLD-SCNC: 1 MMOL/L (ref 1.12–1.32)
CALCIUM SERPL-MCNC: 7.1 MG/DL (ref 8.3–10.6)
CHLORIDE SERPL-SCNC: 105 MMOL/L (ref 99–110)
CO2 SERPL-SCNC: 24 MMOL/L (ref 21–32)
CREAT SERPL-MCNC: 3.1 MG/DL (ref 0.6–1.2)
DEPRECATED RDW RBC AUTO: 15.2 % (ref 12.4–15.4)
DESCRIPTION BLOOD BANK: NORMAL
GFR SERPLBLD CREATININE-BSD FMLA CKD-EPI: 14 ML/MIN/{1.73_M2}
GLUCOSE SERPL-MCNC: 103 MG/DL (ref 70–99)
HCT VFR BLD AUTO: 18.8 % (ref 36–48)
HCT VFR BLD AUTO: 19 % (ref 36–48)
HCT VFR BLD AUTO: 21.4 % (ref 36–48)
HGB BLD-MCNC: 6.6 G/DL (ref 12–16)
HGB BLD-MCNC: 6.6 G/DL (ref 12–16)
HGB BLD-MCNC: 7.2 G/DL (ref 12–16)
MCH RBC QN AUTO: 30.4 PG (ref 26–34)
MCHC RBC AUTO-ENTMCNC: 33.8 G/DL (ref 31–36)
MCV RBC AUTO: 89.9 FL (ref 80–100)
PH BLDV: 7.3 [PH] (ref 7.35–7.45)
PHOSPHATE SERPL-MCNC: 4.1 MG/DL (ref 2.5–4.9)
PLATELET # BLD AUTO: 120 K/UL (ref 135–450)
PMV BLD AUTO: 8.3 FL (ref 5–10.5)
POTASSIUM SERPL-SCNC: 4.3 MMOL/L (ref 3.5–5.1)
RBC # BLD AUTO: 2.38 M/UL (ref 4–5.2)
SODIUM SERPL-SCNC: 138 MMOL/L (ref 136–145)
WBC # BLD AUTO: 7.8 K/UL (ref 4–11)

## 2023-09-29 PROCEDURE — 6360000002 HC RX W HCPCS: Performed by: INTERNAL MEDICINE

## 2023-09-29 PROCEDURE — 2580000003 HC RX 258: Performed by: INTERNAL MEDICINE

## 2023-09-29 PROCEDURE — 1200000000 HC SEMI PRIVATE

## 2023-09-29 PROCEDURE — 2580000003 HC RX 258: Performed by: ORTHOPAEDIC SURGERY

## 2023-09-29 PROCEDURE — 85014 HEMATOCRIT: CPT

## 2023-09-29 PROCEDURE — 85018 HEMOGLOBIN: CPT

## 2023-09-29 PROCEDURE — 82330 ASSAY OF CALCIUM: CPT

## 2023-09-29 PROCEDURE — 85027 COMPLETE CBC AUTOMATED: CPT

## 2023-09-29 PROCEDURE — 6370000000 HC RX 637 (ALT 250 FOR IP): Performed by: INTERNAL MEDICINE

## 2023-09-29 PROCEDURE — 6370000000 HC RX 637 (ALT 250 FOR IP): Performed by: ORTHOPAEDIC SURGERY

## 2023-09-29 PROCEDURE — 97530 THERAPEUTIC ACTIVITIES: CPT

## 2023-09-29 PROCEDURE — 6360000002 HC RX W HCPCS: Performed by: NURSE PRACTITIONER

## 2023-09-29 PROCEDURE — 36430 TRANSFUSION BLD/BLD COMPNT: CPT

## 2023-09-29 PROCEDURE — 2700000000 HC OXYGEN THERAPY PER DAY

## 2023-09-29 PROCEDURE — 80069 RENAL FUNCTION PANEL: CPT

## 2023-09-29 PROCEDURE — 94761 N-INVAS EAR/PLS OXIMETRY MLT: CPT

## 2023-09-29 PROCEDURE — 72040 X-RAY EXAM NECK SPINE 2-3 VW: CPT

## 2023-09-29 PROCEDURE — 97535 SELF CARE MNGMENT TRAINING: CPT

## 2023-09-29 PROCEDURE — 94760 N-INVAS EAR/PLS OXIMETRY 1: CPT

## 2023-09-29 PROCEDURE — 36415 COLL VENOUS BLD VENIPUNCTURE: CPT

## 2023-09-29 PROCEDURE — 97167 OT EVAL HIGH COMPLEX 60 MIN: CPT

## 2023-09-29 PROCEDURE — 97162 PT EVAL MOD COMPLEX 30 MIN: CPT

## 2023-09-29 RX ORDER — SODIUM CHLORIDE 9 MG/ML
INJECTION, SOLUTION INTRAVENOUS PRN
Status: DISCONTINUED | OUTPATIENT
Start: 2023-09-29 | End: 2023-10-05 | Stop reason: HOSPADM

## 2023-09-29 RX ADMIN — HEPARIN SODIUM 5000 UNITS: 5000 INJECTION INTRAVENOUS; SUBCUTANEOUS at 05:37

## 2023-09-29 RX ADMIN — SODIUM CHLORIDE, PRESERVATIVE FREE 10 ML: 5 INJECTION INTRAVENOUS at 08:32

## 2023-09-29 RX ADMIN — TRAMADOL HYDROCHLORIDE 100 MG: 50 TABLET ORAL at 10:10

## 2023-09-29 RX ADMIN — PANTOPRAZOLE SODIUM 40 MG: 40 TABLET, DELAYED RELEASE ORAL at 05:37

## 2023-09-29 RX ADMIN — MIDODRINE HYDROCHLORIDE 10 MG: 10 TABLET ORAL at 11:29

## 2023-09-29 RX ADMIN — SODIUM CHLORIDE: 9 INJECTION, SOLUTION INTRAVENOUS at 10:10

## 2023-09-29 RX ADMIN — CASTOR OIL AND BALSAM, PERU: 788; 87 OINTMENT TOPICAL at 08:32

## 2023-09-29 RX ADMIN — MIDODRINE HYDROCHLORIDE 10 MG: 10 TABLET ORAL at 08:31

## 2023-09-29 RX ADMIN — SODIUM CHLORIDE, PRESERVATIVE FREE 10 ML: 5 INJECTION INTRAVENOUS at 20:12

## 2023-09-29 RX ADMIN — Medication 6 MG: at 20:08

## 2023-09-29 RX ADMIN — BUSPIRONE HYDROCHLORIDE 10 MG: 10 TABLET ORAL at 08:32

## 2023-09-29 RX ADMIN — MIDODRINE HYDROCHLORIDE 10 MG: 10 TABLET ORAL at 16:15

## 2023-09-29 RX ADMIN — HEPARIN SODIUM 5000 UNITS: 5000 INJECTION INTRAVENOUS; SUBCUTANEOUS at 20:08

## 2023-09-29 RX ADMIN — CASTOR OIL AND BALSAM, PERU: 788; 87 OINTMENT TOPICAL at 20:10

## 2023-09-29 RX ADMIN — CALCIUM GLUCONATE 2000 MG: 20 INJECTION, SOLUTION INTRAVENOUS at 18:14

## 2023-09-29 RX ADMIN — ACETAMINOPHEN 650 MG: 325 TABLET ORAL at 11:29

## 2023-09-29 RX ADMIN — SODIUM CHLORIDE, PRESERVATIVE FREE 10 ML: 5 INJECTION INTRAVENOUS at 08:34

## 2023-09-29 RX ADMIN — RISPERIDONE 1 MG: 1 TABLET ORAL at 20:09

## 2023-09-29 RX ADMIN — ACETAMINOPHEN 650 MG: 325 TABLET ORAL at 16:15

## 2023-09-29 RX ADMIN — BUSPIRONE HYDROCHLORIDE 10 MG: 10 TABLET ORAL at 20:09

## 2023-09-29 RX ADMIN — ACETAMINOPHEN 650 MG: 325 TABLET ORAL at 05:37

## 2023-09-29 RX ADMIN — HEPARIN SODIUM 5000 UNITS: 5000 INJECTION INTRAVENOUS; SUBCUTANEOUS at 16:14

## 2023-09-29 RX ADMIN — BUSPIRONE HYDROCHLORIDE 10 MG: 10 TABLET ORAL at 16:15

## 2023-09-29 RX ADMIN — SERTRALINE 50 MG: 50 TABLET, FILM COATED ORAL at 20:09

## 2023-09-29 RX ADMIN — SODIUM CHLORIDE, PRESERVATIVE FREE 10 ML: 5 INJECTION INTRAVENOUS at 20:10

## 2023-09-29 ASSESSMENT — PAIN SCALES - PAIN ASSESSMENT IN ADVANCED DEMENTIA (PAINAD)
FACIALEXPRESSION: 0
NEGVOCALIZATION: 0
BODYLANGUAGE: 0
TOTALSCORE: 0
BODYLANGUAGE: 0
TOTALSCORE: 0
CONSOLABILITY: 0
BREATHING: 0
TOTALSCORE: 0
FACIALEXPRESSION: 0
NEGVOCALIZATION: 0
BREATHING: 0
CONSOLABILITY: 0
BREATHING: 0
TOTALSCORE: 0
BREATHING: 0
FACIALEXPRESSION: 0
NEGVOCALIZATION: 0
FACIALEXPRESSION: 0
NEGVOCALIZATION: 0
BODYLANGUAGE: 0
CONSOLABILITY: 0
CONSOLABILITY: 0
BODYLANGUAGE: 0

## 2023-09-29 ASSESSMENT — PAIN DESCRIPTION - ONSET: ONSET: ON-GOING

## 2023-09-29 ASSESSMENT — PAIN DESCRIPTION - LOCATION: LOCATION: LEG

## 2023-09-29 ASSESSMENT — PAIN DESCRIPTION - ORIENTATION: ORIENTATION: LEFT

## 2023-09-29 ASSESSMENT — PAIN - FUNCTIONAL ASSESSMENT: PAIN_FUNCTIONAL_ASSESSMENT: PREVENTS OR INTERFERES SOME ACTIVE ACTIVITIES AND ADLS

## 2023-09-29 ASSESSMENT — PAIN SCALES - GENERAL
PAINLEVEL_OUTOF10: 8
PAINLEVEL_OUTOF10: 0
PAINLEVEL_OUTOF10: 0

## 2023-09-29 ASSESSMENT — PAIN DESCRIPTION - FREQUENCY: FREQUENCY: CONTINUOUS

## 2023-09-29 ASSESSMENT — PAIN DESCRIPTION - PAIN TYPE: TYPE: SURGICAL PAIN

## 2023-09-29 ASSESSMENT — PAIN SCALES - WONG BAKER: WONGBAKER_NUMERICALRESPONSE: 0

## 2023-09-29 ASSESSMENT — PAIN DESCRIPTION - DESCRIPTORS: DESCRIPTORS: ACHING
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
## 2023-09-29 NOTE — PROGRESS NOTES
Karen NP notified for new numbness and tingling in both upper extremities. Patient able to follow commands and squeeze hands. Cervical spine xray ordered.    Electronically signed by Claudia Mark RN on 9/29/2023 at 4:49 PM

## 2023-09-29 NOTE — PROGRESS NOTES
Occupational Therapy  Facility/Department: [facility]  Occupational Therapy Initial Assessment    Name: Danny Schuster  : 1935  MRN: [id number]  Date of Service: 2023    Discharge Recommendations:  3-5 sessions per week, Patient would benefit from continued therapy after discharge        Danny Schuster scored a  on the AM-PAC ADL Inpatient form. Current research shows that an AM-PAC score of 17 or less is typically not associated with a discharge to the patient's home setting. Based on the patient's AM-PAC score and their current ADL deficits, it is recommended that the patient have 3-5 sessions per week of Occupational Therapy at d/c to increase the patient's independence. Please see assessment section for further patient specific details. If patient discharges prior to next session this note will serve as a discharge summary. Please see below for the latest assessment towards goals. Patient Diagnosis(es): The primary encounter diagnosis was Closed displaced intertrochanteric fracture of left femur, initial encounter ([address]). Diagnoses of ZAC (acute kidney injury) ([address]) and Bruising were also pertinent to this visit. Past Medical History:  has a past medical history of Allergic rhinitis, Anxiety, generalized, Arthritis, Chronic dermatitis of hands, Dementia ([address]), Depression, Full dentures, Hypertension, essential, benign, Impetigo, Intracervical pessary, Lumbar stenosis, Plantar fasciitis, Right-sided low back pain without sciatica, Thrush, Tobacco use disorder, and Uterus prolapse. Past Surgical History:  has a past surgical history that includes Bunionectomy (Left, 2019); Bunionectomy (Left, 3/11/2019); and Femur fracture surgery (Left, 2023). Treatment Diagnosis: impaired ADL/fxl mobility    Assessment   Performance deficits / Impairments: Decreased functional mobility ; Decreased safe awareness;Decreased balance;Decreased ADL status; Decreased Recommendations: Balance training, Functional mobility training, Endurance training, Pain management, Safety education & training, Positioning, Patient/Caregiver education & training, Self-Care / ADL, Home management training, Modalities     Restrictions  Restrictions/Precautions  Restrictions/Precautions: Weight Bearing, Fall Risk  Lower Extremity Weight Bearing Restrictions  Left Lower Extremity Weight Bearing: Toe Touch Weight Bearing  Position Activity Restriction  Other position/activity restrictions: \"Touch down with minimal weight bearing on operative leg just for balance. \"   Per ortho order 9-27-23    Subjective   General  Chart Reviewed: Yes  Patient assessed for rehabilitation services?: Yes  Additional Pertinent Hx: 81 yo female admitted for a fall with L intertrochanteric fx s/p 9/27 L femoral nailing now TTWB. Post Op complicated by critically low H&H requiring transfusions. PMH: Dementia, Lumbar stenosis, HTN, Depression, L humerus fx  Family / Caregiver Present: Yes (dtr at 2nd half of session)  Referring Practitioner: Ania Crabtree MD  Diagnosis: Fall  Subjective  Subjective: Pt resting in bed upon arrival, sleeping. Pt awakes to voice, falls asleep, however, awakes again and maintains alertness and participation. Pt reporting RLE pain with movement- unable to numerically rate. Orthopedic NP present throughout  General Comment  Comments: RN ok to see       Social/Functional History  Social/Functional History  Type of Home:  (Memory Unit at [facility])  Home Layout: One level  Home Access: Level entry  Has the patient had two or more falls in the past year or any fall with injury in the past year?:  (fall brought here)  Additional Comments: dtr reported assist ADL and IADL; was ambulating without AD in April 2023 per chart review. Uncertain of status prior to current admission. Objective           Observation/Palpation  Observation: LLE significant swelling in entire upper thigh.

## 2023-09-29 NOTE — PLAN OF CARE
Problem: Discharge Planning  Goal: Discharge to home or other facility with appropriate resources  9/28/2023 2258 by Angelia Ramsey RN  Outcome: Progressing  Flowsheets (Taken 9/27/2023 2051)  Discharge to home or other facility with appropriate resources: Identify barriers to discharge with patient and caregiver  9/28/2023 1357 by Rj Nicole RN  Outcome: Progressing     Problem: Safety - Adult  Goal: Free from fall injury  9/28/2023 2258 by Angelia Ramsey RN  Outcome: Progressing  Flowsheets (Taken 9/27/2023 0544 by Magali Quiros RN)  Free From Fall Injury: Instruct family/caregiver on patient safety  9/28/2023 1357 by Rj Nicole RN  Outcome: Progressing     Problem: Pain  Goal: Verbalizes/displays adequate comfort level or baseline comfort level  9/28/2023 2258 by Angelia Ramsey RN  Outcome: Progressing  Flowsheets (Taken 9/27/2023 0544 by Magali Quiros RN)  Verbalizes/displays adequate comfort level or baseline comfort level:   Encourage patient to monitor pain and request assistance   Assess pain using appropriate pain scale   Administer analgesics based on type and severity of pain and evaluate response   Implement non-pharmacological measures as appropriate and evaluate response  9/28/2023 1357 by Rj Nicole RN  Outcome: Progressing     Problem: Skin/Tissue Integrity  Goal: Absence of new skin breakdown  Description: 1. Monitor for areas of redness and/or skin breakdown  2. Assess vascular access sites hourly  3. Every 4-6 hours minimum:  Change oxygen saturation probe site  4. Every 4-6 hours:  If on nasal continuous positive airway pressure, respiratory therapy assess nares and determine need for appliance change or resting period.   9/28/2023 2258 by Angelia Ramsey RN  Outcome: Progressing  9/28/2023 1357 by Rj Nicole RN  Outcome: Progressing     Problem: ABCDS Injury Assessment  Goal: Absence of physical injury  9/28/2023 2258 by Angelia Ramsey RN  Outcome:

## 2023-09-29 NOTE — PROGRESS NOTES
Physical Therapy    Facility/Department: [address] [facility]THOPEDICS  Physical Therapy Initial Assessment    Name: Salvador Scott  : 1935  MRN: [id number]  Date of Service: 2023    Discharge Recommendations:  Continue to assess pending progress, Patient would benefit from continued therapy after discharge (3-5)          Patient Diagnosis(es): The primary encounter diagnosis was Closed displaced intertrochanteric fracture of left femur, initial encounter ([address]). Diagnoses of ZAC (acute kidney injury) ([address]) and Bruising were also pertinent to this visit. Past Medical History:  has a past medical history of Allergic rhinitis, Anxiety, generalized, Arthritis, Chronic dermatitis of hands, Dementia ([address]), Depression, Full dentures, Hypertension, essential, benign, Impetigo, Intracervical pessary, Lumbar stenosis, Plantar fasciitis, Right-sided low back pain without sciatica, Thrush, Tobacco use disorder, and Uterus prolapse. Past Surgical History:  has a past surgical history that includes Bunionectomy (Left, 2019); Bunionectomy (Left, 3/11/2019); and Femur fracture surgery (Left, 2023). Assessment   Body Structures, Functions, Activity Limitations Requiring Skilled Therapeutic Intervention: Decreased functional mobility ; Decreased ADL status; Decreased safe awareness;Decreased cognition;Decreased endurance  Assessment: Status - sesion: Bed Mobility Max-Dependent of 2. Patient tolerates EOB sitting x ~6 minutes with initial Min, progressing to SBA. Patient indicates high pain (likely at L hip although patient does not localize) during all mobility. Patient awake throughout session, although does request return to bed. Will continue to see patient as co treat with OT, and progress to tolerance. Do not believe patient will be able to progress to ambulation due to ortho restriction of L LE TTWBng.  Will progress toward WC mobility as tolerated.  She francisco benefit from ongoing skilled therapy of precautions;Decreased long term memory;Decreased recall of biographical Information  Safety Judgement: Decreased awareness of need for safety;Decreased awareness of need for assistance  Problem Solving: Decreased awareness of errors;Assistance required to generate solutions;Assistance required to identify errors made;Assistance required to implement solutions;Assistance required to correct errors made  Insights: Not aware of deficits  Initiation: Requires cues for all  Sequencing: Requires cues for all  Cognition Comment: hx Dementia. maintains alertness and following simple commands     Objective   Observation/Palpation  Observation: LLE significant swelling in entire upper thigh. lateral dressing saturated with fluid (not leaking)- Orthopedic NP present and assisted with changing dressing. LLE inner thigh bruising. BUE swelling and bruising at forearms (R worse)  AROM RLE (degrees)  RLE General AROM: hip/knee 90/90 at EOB sitting. Ankle DF to neutral  AROM LLE (degrees)  LLE General AROM: hip/knee 90/90 at EOB sitting. Ankle DF to neutral  Strength RLE  Comment: unable to assess due to pain, fatigue, dementia, and rigors of bed mobility  Strength LLE  Comment: unable to assess due to pain, fatigue, dementia, and rigors of bed mobility  Bed mobility  Rolling to Left: Maximum assistance;2 Person assistance (3 x for positioning and changing linens- once on side unable to maintain side lying hold- requires Total A)  Rolling to Right: Maximum assistance;2 Person assistance (3 x for positioning and changing linens- once on side unable to maintain side lying hold- requires Total A)  Supine to Sit: 2 Person assistance;Maximum assistance (HOB elevated.)  Sit to Supine: 2 Person assistance;Maximum assistance  Scooting: Dependent/Total  Bed Mobility Comments: Patient rolled multiple times to allow ortho NP to change dressing, for bed linen change, and for repositioning on R side.   Balance  Posture: Fair (trunk flexion @

## 2023-09-29 NOTE — PROGRESS NOTES
Spoke with patient's daughter Aimee Thompson to schedule a time to meet. Aimee Thompson requested tomorrow around 21 [phone] due to her sister arriving here from [place] around that time. Hospital liaison will update following meeting.     [address] [place] [address],[address] Liaison   [phone]

## 2023-09-29 NOTE — CARE COORDINATION
09/29/23 1443   IMM Letter   IMM Letter given to Patient/Family/Significant other/Guardian/POA/by: to patient and daughter Morgan Lisa present in room  hs   IMM Letter date given: 09/29/23   IMM Letter time given: 8700     Education provided to patient. Patient reported no questions and verbalized understanding. Patient made aware that he/she has 4 hours prior to discharging from hospital to decide if he/she wishes to pursue the Medicare appeal process.

## 2023-09-29 NOTE — CARE COORDINATION
Spoke with patient's daughter, Jackie Huertas ([phone]), regarding hospice order and provided her with hospice list. She is agreeable to [facility]. Explained that [address] contracts with [facility] but per Zachery Garcia at the facility patient/family can choose hospice they prefer. Zachery Garcia at [facility] reported that if patient returns skilled, patient will need an [employer] precert. However, once the precert is initiated (while patient is still in the hospital) [facility] can accept the patient back without having to wait for determination as patient is long term care resident. If patient returns long term care with hospice, precert is not needed. Zachery Garcia is aware referral was made to [facility] per family choice. Zachery Garcia at [facility] is the contact ([phone]). Referral made to Tyia at [facility]; facesheet faxed.      Electronically signed by Gene Marti, MSW, LISW, Case Management on 9/29/2023 at 2:56 PM  [place] [phone]

## 2023-09-29 NOTE — PROGRESS NOTES
[facility] ALCIRA Scanner Results  Pt was scanned according to 's recommendations. Daily      Site Reading Redness noted? Y/N   Right heel 0.5 y   Left heel  0.5 y   Sacrum 1.0 y       []  ALCIRA Delta score < 0.6 indicates normal, healthy tissue at this time. Continue to assess daily and implement routine pressure injury prevention measures using the Cr Order Set. Scan weekly on Wednesday. [x] ALCIRA Delta score > or = to 0.6 indicates at risk tissue. Implement target prevention interventions below and scan daily.     [] Cr orders reviewed and updated if indicated  [x] Educated patient/family on importance of offloading/repositioning  [x] Patient agreeable to plan for pressure offloading/repositioning    Positioning wedge, Extra pillows, and Heel boot(s)      [] Nutrition consult made  [x] Nutrition consult not indicated at this time     [] Sacral foam border in place  [x] Sacral foam border not in place, barrier cream applied    [x] Low air loss mattress (or Isoflex blue/orange mattress) ordered/placed   [x] For heels, apply liquid skin barrier twice daily and ensure offloading with pillows or boots  Electronically signed by Romell Ganser, RN on 9/29/2023 at 10:31 AM

## 2023-09-29 NOTE — CARE COORDINATION
Pt seen with RN for ALCIRA scan of sacral area; 1.0. Pt has blanchable erythema. Had been turning and using sacral border. Started venelex yesterday. Would continue venelex and no sacral border. Bilat heels intact, using heel boots, skin barrier to bilat heels. Pt is on low air loss mattress. Limiting time on back.  Aida Yu, MSN, RN, [facility]
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
## 2023-09-29 NOTE — PROGRESS NOTES
Patient up in bed and Alert to self. Orientation is consistent with baseline. Patient denies ay pain and is not showing any indicators of pain. Morning medications administered crushed in apple sauce with no complications. Head to toe assessment complete with changes documented. New drainage noted on incision dressing. This RN will change dressing if dressing becomes saturated. Dressing is intact. Pulses remain 1+ bilaterally In BLE. SCDS in place. Garcia in place and draining. Fall precautions in place.   Electronically signed by Suellen Patel RN on 9/29/2023 at 9:09 AM

## 2023-09-29 NOTE — PLAN OF CARE
Problem: Discharge Planning  Goal: Discharge to home or other facility with appropriate resources  9/29/2023 1148 by Katie Castellon RN  Flowsheets (Taken 9/27/2023 2051 by Chery Bravo RN)  Discharge to home or other facility with appropriate resources: Identify barriers to discharge with patient and caregiver  9/29/2023 1148 by Ktaie Castellon RN  Outcome: Progressing  9/28/2023 2258 by Chery Bravo RN  Outcome: Progressing  Flowsheets (Taken 9/27/2023 2051)  Discharge to home or other facility with appropriate resources: Identify barriers to discharge with patient and caregiver     Problem: Safety - Adult  Goal: Free from fall injury  9/29/2023 1148 by Katie Castellon RN  Flowsheets (Taken 9/27/2023 0544 by Ana Bermudez RN)  Free From Fall Injury: Instruct family/caregiver on patient safety  9/29/2023 1148 by Katie Castellon RN  Outcome: Progressing  9/28/2023 2258 by Chery Bravo RN  Outcome: Progressing  Flowsheets (Taken 9/27/2023 0544 by Ana Bermudez RN)  Free From Fall Injury: Instruct family/caregiver on patient safety     Problem: Pain  Goal: Verbalizes/displays adequate comfort level or baseline comfort level  9/29/2023 1148 by Katie Castellon RN  Flowsheets (Taken 9/27/2023 0544 by Ana Bermudez RN)  Verbalizes/displays adequate comfort level or baseline comfort level:   Encourage patient to monitor pain and request assistance   Assess pain using appropriate pain scale   Administer analgesics based on type and severity of pain and evaluate response   Implement non-pharmacological measures as appropriate and evaluate response  9/29/2023 1148 by Katie Castellon RN  Outcome: Progressing  9/28/2023 2258 by Chery Bravo RN  Outcome: Progressing  Flowsheets (Taken 9/27/2023 0544 by Ana Bermudez RN)  Verbalizes/displays adequate comfort level or baseline comfort level:   Encourage patient to monitor pain and request assistance   Assess pain using appropriate pain scale   Administer analgesics based on type and severity of pain and evaluate response   Implement non-pharmacological measures as appropriate and evaluate response     Problem: Skin/Tissue Integrity  Goal: Absence of new skin breakdown  Description: 1. Monitor for areas of redness and/or skin breakdown  2. Assess vascular access sites hourly  3. Every 4-6 hours minimum:  Change oxygen saturation probe site  4. Every 4-6 hours:  If on nasal continuous positive airway pressure, respiratory therapy assess nares and determine need for appliance change or resting period.   9/29/2023 1148 by Julio César Pizano RN  Outcome: Progressing  9/28/2023 2258 by Keith Martínez RN  Outcome: Progressing     Problem: ABCDS Injury Assessment  Goal: Absence of physical injury  9/29/2023 1148 by Julio César Pizano RN  Flowsheets (Taken 9/27/2023 2105 by Keith Martínez RN)  Absence of Physical Injury: Implement safety measures based on patient assessment  9/29/2023 1148 by Julio César Pizano RN  Outcome: Progressing  9/28/2023 2258 by Keith Martínez RN  Outcome: Progressing  Flowsheets (Taken 9/27/2023 2105)  Absence of Physical Injury: Implement safety measures based on patient assessment   Electronically signed by Julio César Pizano RN on 9/29/2023 at 11:49 AM

## 2023-09-30 LAB
ALBUMIN SERPL-MCNC: 2.4 G/DL (ref 3.4–5)
ANION GAP SERPL CALCULATED.3IONS-SCNC: 7 MMOL/L (ref 3–16)
BUN SERPL-MCNC: 44 MG/DL (ref 7–20)
CALCIUM SERPL-MCNC: 7.8 MG/DL (ref 8.3–10.6)
CHLORIDE SERPL-SCNC: 107 MMOL/L (ref 99–110)
CO2 SERPL-SCNC: 25 MMOL/L (ref 21–32)
CREAT SERPL-MCNC: 2.1 MG/DL (ref 0.6–1.2)
DEPRECATED RDW RBC AUTO: 15.2 % (ref 12.4–15.4)
GFR SERPLBLD CREATININE-BSD FMLA CKD-EPI: 22 ML/MIN/{1.73_M2}
GLUCOSE SERPL-MCNC: 94 MG/DL (ref 70–99)
HCT VFR BLD AUTO: 23.2 % (ref 36–48)
HGB BLD-MCNC: 8.1 G/DL (ref 12–16)
MCH RBC QN AUTO: 31.2 PG (ref 26–34)
MCHC RBC AUTO-ENTMCNC: 35 G/DL (ref 31–36)
MCV RBC AUTO: 89 FL (ref 80–100)
PHOSPHATE SERPL-MCNC: 2.8 MG/DL (ref 2.5–4.9)
PLATELET # BLD AUTO: 139 K/UL (ref 135–450)
PMV BLD AUTO: 8.4 FL (ref 5–10.5)
POTASSIUM SERPL-SCNC: 4.1 MMOL/L (ref 3.5–5.1)
RBC # BLD AUTO: 2.61 M/UL (ref 4–5.2)
SODIUM SERPL-SCNC: 139 MMOL/L (ref 136–145)
WBC # BLD AUTO: 6.5 K/UL (ref 4–11)

## 2023-09-30 PROCEDURE — 6370000000 HC RX 637 (ALT 250 FOR IP): Performed by: ORTHOPAEDIC SURGERY

## 2023-09-30 PROCEDURE — 86701 HIV-1ANTIBODY: CPT

## 2023-09-30 PROCEDURE — 2580000003 HC RX 258

## 2023-09-30 PROCEDURE — 6370000000 HC RX 637 (ALT 250 FOR IP): Performed by: INTERNAL MEDICINE

## 2023-09-30 PROCEDURE — 2580000003 HC RX 258: Performed by: INTERNAL MEDICINE

## 2023-09-30 PROCEDURE — 85027 COMPLETE CBC AUTOMATED: CPT

## 2023-09-30 PROCEDURE — 87591 N.GONORRHOEAE DNA AMP PROB: CPT

## 2023-09-30 PROCEDURE — 86702 HIV-2 ANTIBODY: CPT

## 2023-09-30 PROCEDURE — 80069 RENAL FUNCTION PANEL: CPT

## 2023-09-30 PROCEDURE — 2580000003 HC RX 258: Performed by: ORTHOPAEDIC SURGERY

## 2023-09-30 PROCEDURE — 36415 COLL VENOUS BLD VENIPUNCTURE: CPT

## 2023-09-30 PROCEDURE — 87491 CHLMYD TRACH DNA AMP PROBE: CPT

## 2023-09-30 PROCEDURE — 1200000000 HC SEMI PRIVATE

## 2023-09-30 PROCEDURE — 94761 N-INVAS EAR/PLS OXIMETRY MLT: CPT

## 2023-09-30 PROCEDURE — 6360000002 HC RX W HCPCS: Performed by: INTERNAL MEDICINE

## 2023-09-30 PROCEDURE — 86780 TREPONEMA PALLIDUM: CPT

## 2023-09-30 PROCEDURE — 2500000003 HC RX 250 WO HCPCS: Performed by: INTERNAL MEDICINE

## 2023-09-30 PROCEDURE — 2700000000 HC OXYGEN THERAPY PER DAY

## 2023-09-30 PROCEDURE — 6360000002 HC RX W HCPCS: Performed by: NURSE PRACTITIONER

## 2023-09-30 PROCEDURE — 87390 HIV-1 AG IA: CPT

## 2023-09-30 RX ORDER — WATER 1000 ML/1000ML
INJECTION, SOLUTION INTRAVENOUS
Status: COMPLETED
Start: 2023-09-30 | End: 2023-09-30

## 2023-09-30 RX ORDER — CEFTRIAXONE 1 G/1
500 INJECTION, POWDER, FOR SOLUTION INTRAMUSCULAR; INTRAVENOUS ONCE
Status: COMPLETED | OUTPATIENT
Start: 2023-09-30 | End: 2023-09-30

## 2023-09-30 RX ADMIN — ACETAMINOPHEN 650 MG: 325 TABLET ORAL at 18:56

## 2023-09-30 RX ADMIN — BUSPIRONE HYDROCHLORIDE 10 MG: 10 TABLET ORAL at 13:50

## 2023-09-30 RX ADMIN — HEPARIN SODIUM 5000 UNITS: 5000 INJECTION INTRAVENOUS; SUBCUTANEOUS at 05:36

## 2023-09-30 RX ADMIN — MIDODRINE HYDROCHLORIDE 10 MG: 10 TABLET ORAL at 18:56

## 2023-09-30 RX ADMIN — CASTOR OIL AND BALSAM, PERU: 788; 87 OINTMENT TOPICAL at 11:26

## 2023-09-30 RX ADMIN — SERTRALINE 50 MG: 50 TABLET, FILM COATED ORAL at 20:28

## 2023-09-30 RX ADMIN — MIDODRINE HYDROCHLORIDE 10 MG: 10 TABLET ORAL at 11:25

## 2023-09-30 RX ADMIN — RISPERIDONE 1 MG: 1 TABLET ORAL at 20:28

## 2023-09-30 RX ADMIN — BUSPIRONE HYDROCHLORIDE 10 MG: 10 TABLET ORAL at 11:26

## 2023-09-30 RX ADMIN — CEFTRIAXONE 500 MG: 1 INJECTION, POWDER, FOR SOLUTION INTRAMUSCULAR; INTRAVENOUS at 20:03

## 2023-09-30 RX ADMIN — BUSPIRONE HYDROCHLORIDE 10 MG: 10 TABLET ORAL at 20:28

## 2023-09-30 RX ADMIN — Medication 6 MG: at 20:29

## 2023-09-30 RX ADMIN — SODIUM CHLORIDE, PRESERVATIVE FREE 10 ML: 5 INJECTION INTRAVENOUS at 11:26

## 2023-09-30 RX ADMIN — ACETAMINOPHEN 650 MG: 325 TABLET ORAL at 00:27

## 2023-09-30 RX ADMIN — SODIUM CHLORIDE, PRESERVATIVE FREE 10 ML: 5 INJECTION INTRAVENOUS at 11:27

## 2023-09-30 RX ADMIN — ACETAMINOPHEN 650 MG: 325 TABLET ORAL at 11:26

## 2023-09-30 RX ADMIN — PANTOPRAZOLE SODIUM 40 MG: 40 TABLET, DELAYED RELEASE ORAL at 05:36

## 2023-09-30 RX ADMIN — WATER 10 ML: 1 INJECTION INTRAMUSCULAR; INTRAVENOUS; SUBCUTANEOUS at 20:28

## 2023-09-30 RX ADMIN — MIDODRINE HYDROCHLORIDE 10 MG: 10 TABLET ORAL at 13:50

## 2023-09-30 RX ADMIN — TRAMADOL HYDROCHLORIDE 50 MG: 50 TABLET ORAL at 05:40

## 2023-09-30 RX ADMIN — ACETAMINOPHEN 650 MG: 325 TABLET ORAL at 05:36

## 2023-09-30 RX ADMIN — DOXYCYCLINE 100 MG: 100 INJECTION, POWDER, LYOPHILIZED, FOR SOLUTION INTRAVENOUS at 18:55

## 2023-09-30 RX ADMIN — WATER 10 ML: 1 INJECTION INTRAMUSCULAR; INTRAVENOUS; SUBCUTANEOUS at 20:35

## 2023-09-30 ASSESSMENT — PAIN SCALES - WONG BAKER: WONGBAKER_NUMERICALRESPONSE: 0

## 2023-09-30 ASSESSMENT — PAIN SCALES - GENERAL
PAINLEVEL_OUTOF10: 3
PAINLEVEL_OUTOF10: 0
PAINLEVEL_OUTOF10: 5
PAINLEVEL_OUTOF10: 3
PAINLEVEL_OUTOF10: 0
PAINLEVEL_OUTOF10: 0

## 2023-09-30 NOTE — CARE COORDINATION
Chart review completed by this writer. We are in the process of calling [facility] nurse per family's request. Notes will be updated momentarily. Respectfully submitted,    Kirsty RAHMAN, [facility]   [phone]    Electronically signed by JOSIAH Joshi, LSW on 9/30/2023 at 4:53 PM
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
## 2023-09-30 NOTE — PROGRESS NOTES
[facility] ALCIRA Scanner Results  Pt was scanned according to 's recommendations. Daily      Site Reading Redness noted? Y/N   Right heel 0.2 y   Left heel  0.4 y   Sacrum 1.1 y       []  ALCIRA Delta score < 0.6 indicates normal, healthy tissue at this time. Continue to assess daily and implement routine pressure injury prevention measures using the Cr Order Set. Scan weekly on Wednesday. [x] ALCIRA Delta score > or = to 0.6 indicates at risk tissue. Implement target prevention interventions below and scan daily.     [x] Cr orders reviewed and updated if indicated  [x] Educated patient/family on importance of offloading/repositioning  [x] Patient agreeable to plan for pressure offloading/repositioning    Positioning wedge, Extra pillows, Heel boot(s), and Pre-inflated chair cushion      [] Nutrition consult made  [x] Nutrition consult not indicated at this time     [] Sacral foam border in place  [x] Sacral foam border not in place, barrier cream applied    [x] Low air loss mattress (or Isoflex blue/orange mattress) ordered/placed   [x] For heels, apply liquid skin barrier twice daily and ensure offloading with pillows or boots

## 2023-09-30 NOTE — PLAN OF CARE
Problem: Discharge Planning  Goal: Discharge to home or other facility with appropriate resources  9/30/2023 1957 by Stephany Augustin RN  Outcome: Progressing  Flowsheets (Taken 9/30/2023 1300)  Discharge to home or other facility with appropriate resources: Identify barriers to discharge with patient and caregiver  9/30/2023 0657 by David Cordova RN  Outcome: Progressing  Flowsheets (Taken 9/29/2023 2000)  Discharge to home or other facility with appropriate resources: Identify barriers to discharge with patient and caregiver     Problem: Safety - Adult  Goal: Free from fall injury  9/30/2023 1957 by Stephany Augustin RN  Outcome: Progressing  Flowsheets (Taken 9/30/2023 1956)  Free From Fall Injury: Instruct family/caregiver on patient safety  9/30/2023 0657 by David Cordova RN  Outcome: Progressing     Problem: Pain  Goal: Verbalizes/displays adequate comfort level or baseline comfort level  9/30/2023 1957 by Stephany Augustin RN  Outcome: Progressing  9/30/2023 0657 by David Cordova RN  Outcome: Progressing     Problem: Skin/Tissue Integrity  Goal: Absence of new skin breakdown  Description: 1. Monitor for areas of redness and/or skin breakdown  2. Assess vascular access sites hourly  3. Every 4-6 hours minimum:  Change oxygen saturation probe site  4. Every 4-6 hours:  If on nasal continuous positive airway pressure, respiratory therapy assess nares and determine need for appliance change or resting period.   9/30/2023 1957 by Stephany Augustin RN  Outcome: Progressing  9/30/2023 0657 by David Cordova RN  Outcome: Progressing     Problem: ABCDS Injury Assessment  Goal: Absence of physical injury  9/30/2023 1957 by Stephany Augustin RN  Outcome: Progressing  Flowsheets (Taken 9/30/2023 1956)  Absence of Physical Injury: Implement safety measures based on patient assessment  9/30/2023 0657 by David Cordova RN  Outcome: Progressing

## 2023-09-30 NOTE — PROGRESS NOTES
Pt BUE are covered in ecchymosis. Pt has bled from subq injections onto gown and is a bleed risk. Notified MD and asked MD about heparin administration.

## 2023-09-30 NOTE — PROGRESS NOTES
Per Dr. Safia Humphries, ok to hold heparin injection for today per previous note by this RN if SCD's are used. SCD's and heel boot in places, heels off of bed and linen and bed pad change performed with PCA. Pt and family are in room. Education provided regarding SCD's and DVT prevention. Family verbalized understanding. No additional questions or concerns at this time.  Electronically signed by Oneyda Phelan RN on 9/30/2023 at 4:06 PM

## 2023-09-30 NOTE — CARE COORDINATION
RUBA spoke with daughter Gustabo Oswald per primary 's request and advised that we just called [facility] for an exam with her permission. She will get another call from [facility] after they arrive to explain the procedure and provide additional consent before things become invasive. We will call again when the patient is ready for discharge. Respectfully submitted,    Kirsty Aparicio [facility]   [phone]    Electronically signed by JOSIAH Desir, FINA on 9/30/2023 at 4:55 PM
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
## 2023-09-30 NOTE — PROGRESS NOTES
Hospice [facility]    Met with pt's daughter Colt Conner to discuss hospice services. Shayy to discuss it with her sister. Colt Conner also states she would like to see if pt's kidney function improves over the weekend before deciding on hospice care. Hospice to follow up tomorrow with Shayy.     Thank you,    Tony Michelle RN  [phone]
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
## 2023-09-30 NOTE — PLAN OF CARE
Problem: Discharge Planning  Goal: Discharge to home or other facility with appropriate resources  Outcome: Progressing  Flowsheets (Taken 9/29/2023 2000)  Discharge to home or other facility with appropriate resources: Identify barriers to discharge with patient and caregiver     Problem: Safety - Adult  Goal: Free from fall injury  Outcome: Progressing     Problem: Pain  Goal: Verbalizes/displays adequate comfort level or baseline comfort level  Outcome: Progressing     Problem: Skin/Tissue Integrity  Goal: Absence of new skin breakdown  Description: 1. Monitor for areas of redness and/or skin breakdown  2. Assess vascular access sites hourly  3. Every 4-6 hours minimum:  Change oxygen saturation probe site  4. Every 4-6 hours:  If on nasal continuous positive airway pressure, respiratory therapy assess nares and determine need for appliance change or resting period.   Outcome: Progressing     Problem: ABCDS Injury Assessment  Goal: Absence of physical injury  Outcome: Progressing

## 2023-09-30 NOTE — PROGRESS NOTES
AGRATIO 1.6 09/26/2023 06:30 PM    LABGLOM 22 09/30/2023 06:00 AM    GLUCOSE 94 09/30/2023 06:00 AM    PROT 5.4 09/26/2023 06:30 PM    CALCIUM 7.8 09/30/2023 06:00 AM    BILITOT 0.5 09/26/2023 06:30 PM    ALKPHOS 66 09/26/2023 06:30 PM    AST 24 09/26/2023 06:30 PM    ALT 18 09/26/2023 06:30 PM      Hepatic Function Panel:   Lab Results   Component Value Date/Time    ALKPHOS 66 09/26/2023 06:30 PM    ALT 18 09/26/2023 06:30 PM    AST 24 09/26/2023 06:30 PM    PROT 5.4 09/26/2023 06:30 PM    BILITOT 0.5 09/26/2023 06:30 PM    BILIDIR <0.2 06/19/2017 05:23 PM    IBILI see below 06/19/2017 05:23 PM      Phosphorus:   Lab Results   Component Value Date/Time    PHOS 2.8 09/30/2023 06:00 AM       ASSESSMENT/PLAN:  ZAC most likely ATN from hypotension  - renal  function better .   - Pt's family is very clear about do not want to pursue dialysis  - continue IVF until tomorrow    Hyperkalemia Sec ZAC/Supp .improved  . Left IT Hip fx, comminuted plan   - per ortho . Hypotension on proamatine     Anemia Hb better  s/p tx . hospice being considered. Family was present. I updated them that the patient's renal function was better. I believe it was her daughter who wanted to know about the proamatine and blood transfusions. I explained to them that she is on them and received them. But, she wanted to know if she needed them and what would happen if we stopped them. I explained that the only way to know would be to stop them, and I asked them if they wanted me to stop them. I attempted to reframe the issues as to the larger question of hospice, which would focus on comfort and allow things to take her natural course, and the details would fall into place and be consistent with the larger question. And, I thought hospice was appropriate.   The daughter wanted to know how I thought she would respond to therapy and if she would walk again, and I referred back to the dementia and the likelihood that she was not going to cooperate and work with therapy, so it unlikely she was going to walk again. The granddaughter (maybe) accused me of \"being vague\" and said that I didn't provide any details and they wanted to know how her kidney function was going to be \"1 month from now and 3 months from now\". I explained that I already informed them that her renal function was better today with the current treatment regimen and that there is literally no way for me to tell them what her renal function was going to be in the future, but again, in many ways, they have to answer the overarching question of how to approach the patient's care. I reassured and confirmed with them that I was not making any changes to her treatment regimen, and they will have further discussion with Dr. Adriane Gonzalez.

## 2023-10-01 LAB
HIV 1+2 AB+HIV1 P24 AG SERPL QL IA: NORMAL
HIV 2 AB SERPL QL IA: NORMAL
HIV1 AB SERPL QL IA: NORMAL
HIV1 P24 AG SERPL QL IA: NORMAL
REAGIN+T PALLIDUM IGG+IGM SERPL-IMP: NORMAL

## 2023-10-01 PROCEDURE — 2580000003 HC RX 258: Performed by: INTERNAL MEDICINE

## 2023-10-01 PROCEDURE — 6370000000 HC RX 637 (ALT 250 FOR IP): Performed by: INTERNAL MEDICINE

## 2023-10-01 PROCEDURE — 6370000000 HC RX 637 (ALT 250 FOR IP): Performed by: ORTHOPAEDIC SURGERY

## 2023-10-01 PROCEDURE — APPNB45 APP NON BILLABLE 31-45 MINUTES

## 2023-10-01 PROCEDURE — 2580000003 HC RX 258: Performed by: ORTHOPAEDIC SURGERY

## 2023-10-01 PROCEDURE — 1200000000 HC SEMI PRIVATE

## 2023-10-01 PROCEDURE — 94760 N-INVAS EAR/PLS OXIMETRY 1: CPT

## 2023-10-01 PROCEDURE — 6360000002 HC RX W HCPCS: Performed by: ORTHOPAEDIC SURGERY

## 2023-10-01 PROCEDURE — 2500000003 HC RX 250 WO HCPCS: Performed by: INTERNAL MEDICINE

## 2023-10-01 RX ORDER — MIDODRINE HYDROCHLORIDE 5 MG/1
5 TABLET ORAL
Status: DISCONTINUED | OUTPATIENT
Start: 2023-10-01 | End: 2023-10-02

## 2023-10-01 RX ORDER — ASPIRIN 81 MG/1
81 TABLET ORAL 2 TIMES DAILY
Qty: 60 TABLET | Refills: 0 | Status: SHIPPED | OUTPATIENT
Start: 2023-10-01 | End: 2023-10-31

## 2023-10-01 RX ORDER — ACETAMINOPHEN 325 MG/1
650 TABLET ORAL EVERY 6 HOURS PRN
Qty: 120 TABLET | Refills: 3 | Status: SHIPPED | OUTPATIENT
Start: 2023-10-01

## 2023-10-01 RX ORDER — TRAMADOL HYDROCHLORIDE 50 MG/1
50 TABLET ORAL EVERY 6 HOURS PRN
Qty: 20 TABLET | Refills: 0 | Status: SHIPPED | OUTPATIENT
Start: 2023-10-01 | End: 2023-10-06

## 2023-10-01 RX ADMIN — BUSPIRONE HYDROCHLORIDE 10 MG: 10 TABLET ORAL at 09:29

## 2023-10-01 RX ADMIN — CASTOR OIL AND BALSAM, PERU: 788; 87 OINTMENT TOPICAL at 09:30

## 2023-10-01 RX ADMIN — Medication 6 MG: at 20:11

## 2023-10-01 RX ADMIN — MIDODRINE HYDROCHLORIDE 10 MG: 10 TABLET ORAL at 13:13

## 2023-10-01 RX ADMIN — SERTRALINE 50 MG: 50 TABLET, FILM COATED ORAL at 20:11

## 2023-10-01 RX ADMIN — SODIUM CHLORIDE, PRESERVATIVE FREE 10 ML: 5 INJECTION INTRAVENOUS at 09:29

## 2023-10-01 RX ADMIN — CASTOR OIL AND BALSAM, PERU: 788; 87 OINTMENT TOPICAL at 20:11

## 2023-10-01 RX ADMIN — ACETAMINOPHEN 650 MG: 325 TABLET ORAL at 23:42

## 2023-10-01 RX ADMIN — ACETAMINOPHEN 650 MG: 325 TABLET ORAL at 00:22

## 2023-10-01 RX ADMIN — ONDANSETRON 4 MG: 2 INJECTION INTRAMUSCULAR; INTRAVENOUS at 11:21

## 2023-10-01 RX ADMIN — DOXYCYCLINE 100 MG: 100 INJECTION, POWDER, LYOPHILIZED, FOR SOLUTION INTRAVENOUS at 18:39

## 2023-10-01 RX ADMIN — DOXYCYCLINE 100 MG: 100 INJECTION, POWDER, LYOPHILIZED, FOR SOLUTION INTRAVENOUS at 04:52

## 2023-10-01 RX ADMIN — BUSPIRONE HYDROCHLORIDE 10 MG: 10 TABLET ORAL at 20:11

## 2023-10-01 RX ADMIN — RISPERIDONE 1 MG: 1 TABLET ORAL at 20:11

## 2023-10-01 RX ADMIN — PANTOPRAZOLE SODIUM 40 MG: 40 TABLET, DELAYED RELEASE ORAL at 06:20

## 2023-10-01 RX ADMIN — BUSPIRONE HYDROCHLORIDE 10 MG: 10 TABLET ORAL at 13:13

## 2023-10-01 RX ADMIN — ACETAMINOPHEN 650 MG: 325 TABLET ORAL at 18:37

## 2023-10-01 RX ADMIN — CASTOR OIL AND BALSAM, PERU: 788; 87 OINTMENT TOPICAL at 00:23

## 2023-10-01 RX ADMIN — ACETAMINOPHEN 650 MG: 325 TABLET ORAL at 10:04

## 2023-10-01 RX ADMIN — ACETAMINOPHEN 650 MG: 325 TABLET ORAL at 04:51

## 2023-10-01 RX ADMIN — MIDODRINE HYDROCHLORIDE 5 MG: 5 TABLET ORAL at 18:37

## 2023-10-01 ASSESSMENT — PAIN SCALES - GENERAL
PAINLEVEL_OUTOF10: 0

## 2023-10-01 ASSESSMENT — PAIN SCALES - WONG BAKER
WONGBAKER_NUMERICALRESPONSE: 0

## 2023-10-01 NOTE — PLAN OF CARE
Problem: Discharge Planning  Goal: Discharge to home or other facility with appropriate resources  Outcome: Progressing  Flowsheets  Taken 10/1/2023 1000  Discharge to home or other facility with appropriate resources: Identify barriers to discharge with patient and caregiver  Taken 10/1/2023 0800  Discharge to home or other facility with appropriate resources:   Identify barriers to discharge with patient and caregiver   Arrange for needed discharge resources and transportation as appropriate     Problem: Safety - Adult  Goal: Free from fall injury  Outcome: Progressing  Flowsheets (Taken 10/1/2023 1457)  Free From Fall Injury: Instruct family/caregiver on patient safety     Problem: Pain  Goal: Verbalizes/displays adequate comfort level or baseline comfort level  Outcome: Progressing     Problem: Skin/Tissue Integrity  Goal: Absence of new skin breakdown  Description: 1. Monitor for areas of redness and/or skin breakdown  2. Assess vascular access sites hourly  3. Every 4-6 hours minimum:  Change oxygen saturation probe site  4. Every 4-6 hours:  If on nasal continuous positive airway pressure, respiratory therapy assess nares and determine need for appliance change or resting period.   Outcome: Progressing     Problem: ABCDS Injury Assessment  Goal: Absence of physical injury  Outcome: Progressing  Flowsheets (Taken 10/1/2023 2617)  Absence of Physical Injury: Implement safety measures based on patient assessment

## 2023-10-01 NOTE — PROGRESS NOTES
Additional dressing change performed at 1300. 2 new ABD pads applied to proximal hip incision Wrapped with ACE wrap for compression. RN spoke with daughter about possible vaginal exam and educated the daughter on the invasiveness of the procedure. Daughter notified RN she will get with sister (michael) and discuss this.    Electronically signed by Donn Perez RN on 10/1/2023 at 1:40 PM

## 2023-10-01 NOTE — PROGRESS NOTES
Pt is in bed resting, VSS this AM. Bed alarm is on, fall precautions in place. Heel boot applied to RLE and pillow is in place to float L heel. SCDs on. Pt denies pain at this time. No additional questions or concerns.  Electronically signed by Marcella Guallpa RN on 10/1/2023 at 8:37 AM
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
## 2023-10-01 NOTE — PROGRESS NOTES
L hip proximal Dressing was completely saturated with serosanguinous drainage at dressing change this morning at 0950. Night shift RN notified this RN that the last dressing change was just before shift change. MD notified.

## 2023-10-01 NOTE — PROGRESS NOTES
[facility] ALCIRA Scanner Results  Pt was scanned according to 's recommendations. Daily      Site Reading Redness noted? Y/N   Right heel 0.3 y   Left heel  0.4 y   Sacrum 1.0 y       []  ALCIRA Delta score < 0.6 indicates normal, healthy tissue at this time. Continue to assess daily and implement routine pressure injury prevention measures using the Cr Order Set. Scan weekly on Wednesday. [x] ALCIRA [place] score > or = to 0.6 indicates at risk tissue. Implement target prevention interventions below and scan daily.     [x] Cr orders reviewed and updated if indicated  [x] Educated patient/family on importance of offloading/repositioning  [x] Patient agreeable to plan for pressure offloading/repositioning    Positioning wedge, Extra pillows, and Heel boot(s)      [] Nutrition consult made  [x] Nutrition consult not indicated at this time     [] Sacral foam border in place  [x] Sacral foam border not in place, barrier cream applied    [x] Low air loss mattress (or Isoflex blue/orange mattress) ordered/placed   [x] For heels, apply liquid skin barrier twice daily and ensure offloading with pillows or boots

## 2023-10-01 NOTE — PROGRESS NOTES
[facility] [address] with pt's daughter Buffy Junior who states she is still undecided on hospice care vs skilled therapy. Buffy Junior waiting to discuss PT/OT options with ortho physician. Hospice to continue to follow daily.     Thank you,    Hu Michelle, RN  [phone]

## 2023-10-02 LAB
ANION GAP SERPL CALCULATED.3IONS-SCNC: 9 MMOL/L (ref 3–16)
BASOPHILS # BLD: 0 K/UL (ref 0–0.2)
BASOPHILS NFR BLD: 0 %
BUN SERPL-MCNC: 24 MG/DL (ref 7–20)
CALCIUM SERPL-MCNC: 8.1 MG/DL (ref 8.3–10.6)
CHLORIDE SERPL-SCNC: 108 MMOL/L (ref 99–110)
CO2 SERPL-SCNC: 26 MMOL/L (ref 21–32)
CREAT SERPL-MCNC: 1.1 MG/DL (ref 0.6–1.2)
DEPRECATED RDW RBC AUTO: 15 % (ref 12.4–15.4)
EOSINOPHIL # BLD: 0.3 K/UL (ref 0–0.6)
EOSINOPHIL NFR BLD: 4 %
GFR SERPLBLD CREATININE-BSD FMLA CKD-EPI: 48 ML/MIN/{1.73_M2}
GLUCOSE SERPL-MCNC: 100 MG/DL (ref 70–99)
HCT VFR BLD AUTO: 24.9 % (ref 36–48)
HGB BLD-MCNC: 8.4 G/DL (ref 12–16)
LYMPHOCYTES # BLD: 0.7 K/UL (ref 1–5.1)
LYMPHOCYTES NFR BLD: 10 %
MCH RBC QN AUTO: 30.7 PG (ref 26–34)
MCHC RBC AUTO-ENTMCNC: 33.8 G/DL (ref 31–36)
MCV RBC AUTO: 90.9 FL (ref 80–100)
METAMYELOCYTES NFR BLD MANUAL: 1 %
MONOCYTES # BLD: 0.5 K/UL (ref 0–1.3)
MONOCYTES NFR BLD: 7 %
NEUTROPHILS # BLD: 5.3 K/UL (ref 1.7–7.7)
NEUTROPHILS NFR BLD: 77 %
NEUTS BAND NFR BLD MANUAL: 1 % (ref 0–7)
PLATELET # BLD AUTO: 202 K/UL (ref 135–450)
PLATELET BLD QL SMEAR: ADEQUATE
PMV BLD AUTO: 7.7 FL (ref 5–10.5)
POTASSIUM SERPL-SCNC: 4.1 MMOL/L (ref 3.5–5.1)
RBC # BLD AUTO: 2.74 M/UL (ref 4–5.2)
RBC MORPH BLD: NORMAL
REASON FOR REJECTION: NORMAL
REJECTED TEST: NORMAL
SLIDE REVIEW: ABNORMAL
SODIUM SERPL-SCNC: 143 MMOL/L (ref 136–145)
WBC # BLD AUTO: 6.7 K/UL (ref 4–11)

## 2023-10-02 PROCEDURE — 36415 COLL VENOUS BLD VENIPUNCTURE: CPT

## 2023-10-02 PROCEDURE — 85025 COMPLETE CBC W/AUTO DIFF WBC: CPT

## 2023-10-02 PROCEDURE — 6370000000 HC RX 637 (ALT 250 FOR IP): Performed by: INTERNAL MEDICINE

## 2023-10-02 PROCEDURE — 6370000000 HC RX 637 (ALT 250 FOR IP): Performed by: ORTHOPAEDIC SURGERY

## 2023-10-02 PROCEDURE — 80048 BASIC METABOLIC PNL TOTAL CA: CPT

## 2023-10-02 PROCEDURE — 99222 1ST HOSP IP/OBS MODERATE 55: CPT | Performed by: OBSTETRICS & GYNECOLOGY

## 2023-10-02 PROCEDURE — 2580000003 HC RX 258: Performed by: INTERNAL MEDICINE

## 2023-10-02 PROCEDURE — 1200000000 HC SEMI PRIVATE

## 2023-10-02 PROCEDURE — 87491 CHLMYD TRACH DNA AMP PROBE: CPT

## 2023-10-02 PROCEDURE — 2500000003 HC RX 250 WO HCPCS: Performed by: INTERNAL MEDICINE

## 2023-10-02 PROCEDURE — 6360000002 HC RX W HCPCS: Performed by: NURSE PRACTITIONER

## 2023-10-02 PROCEDURE — 87591 N.GONORRHOEAE DNA AMP PROB: CPT

## 2023-10-02 RX ADMIN — TRAMADOL HYDROCHLORIDE 100 MG: 50 TABLET ORAL at 08:39

## 2023-10-02 RX ADMIN — ACETAMINOPHEN 650 MG: 325 TABLET ORAL at 18:28

## 2023-10-02 RX ADMIN — PANTOPRAZOLE SODIUM 40 MG: 40 TABLET, DELAYED RELEASE ORAL at 05:29

## 2023-10-02 RX ADMIN — BUSPIRONE HYDROCHLORIDE 10 MG: 10 TABLET ORAL at 14:24

## 2023-10-02 RX ADMIN — ACETAMINOPHEN 650 MG: 325 TABLET ORAL at 14:24

## 2023-10-02 RX ADMIN — Medication 6 MG: at 20:10

## 2023-10-02 RX ADMIN — CASTOR OIL AND BALSAM, PERU: 788; 87 OINTMENT TOPICAL at 20:10

## 2023-10-02 RX ADMIN — SERTRALINE 50 MG: 50 TABLET, FILM COATED ORAL at 20:10

## 2023-10-02 RX ADMIN — HEPARIN SODIUM 5000 UNITS: 5000 INJECTION INTRAVENOUS; SUBCUTANEOUS at 14:24

## 2023-10-02 RX ADMIN — BUSPIRONE HYDROCHLORIDE 10 MG: 10 TABLET ORAL at 08:40

## 2023-10-02 RX ADMIN — DOXYCYCLINE 100 MG: 100 INJECTION, POWDER, LYOPHILIZED, FOR SOLUTION INTRAVENOUS at 18:29

## 2023-10-02 RX ADMIN — CASTOR OIL AND BALSAM, PERU: 788; 87 OINTMENT TOPICAL at 08:39

## 2023-10-02 RX ADMIN — RISPERIDONE 1 MG: 1 TABLET ORAL at 20:10

## 2023-10-02 RX ADMIN — BUSPIRONE HYDROCHLORIDE 10 MG: 10 TABLET ORAL at 20:10

## 2023-10-02 RX ADMIN — DOXYCYCLINE 100 MG: 100 INJECTION, POWDER, LYOPHILIZED, FOR SOLUTION INTRAVENOUS at 05:29

## 2023-10-02 RX ADMIN — ACETAMINOPHEN 650 MG: 325 TABLET ORAL at 05:29

## 2023-10-02 RX ADMIN — MIDODRINE HYDROCHLORIDE 5 MG: 5 TABLET ORAL at 08:40

## 2023-10-02 ASSESSMENT — PAIN SCALES - GENERAL
PAINLEVEL_OUTOF10: 7
PAINLEVEL_OUTOF10: 0
PAINLEVEL_OUTOF10: 3
PAINLEVEL_OUTOF10: 0
PAINLEVEL_OUTOF10: 0
PAINLEVEL_OUTOF10: 7

## 2023-10-02 ASSESSMENT — PAIN SCALES - WONG BAKER
WONGBAKER_NUMERICALRESPONSE: 0
WONGBAKER_NUMERICALRESPONSE: 0

## 2023-10-02 NOTE — PROGRESS NOTES
Spoke with Dr. Bobbi Morales about vaginal cultures that are ordered and need to sent ? Dr. Bobbi Morales states that pt. Being treated with IV antibiotics as if pt. Was exposed to these diseases and any vaginal cultures that would be obtained now would be negative. He states he will see pt. Today. Regular RN's are not able to obtain vaginal cultures because it is not in their scope of practice.
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
## 2023-10-02 NOTE — PROGRESS NOTES
CLINICAL PHARMACY NOTE: MEDS TO BEDS    Retail pharmacy has been notified that the patient is either going to a SNF, ARU, or other inpatient facility. All prescriptions sent to the retail pharmacy for this patient will be kept on profile unless told otherwise.     10/02/23 8:48 AM
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
## 2023-10-02 NOTE — PROGRESS NOTES
Pt is awake her breakfast tray was set-up for her and she is feeding herself. Left hip dressing is clean, dry and intact.
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
## 2023-10-03 LAB
ACANTHOCYTES BLD QL SMEAR: ABNORMAL
ALBUMIN SERPL-MCNC: 2.8 G/DL (ref 3.4–5)
ANION GAP SERPL CALCULATED.3IONS-SCNC: 6 MMOL/L (ref 3–16)
ANISOCYTOSIS BLD QL SMEAR: ABNORMAL
BASOPHILS # BLD: 0 K/UL (ref 0–0.2)
BASOPHILS NFR BLD: 0 %
BUN SERPL-MCNC: 25 MG/DL (ref 7–20)
C TRACH DNA UR QL NAA+PROBE: NEGATIVE
C TRACH DNA UR QL NAA+PROBE: NEGATIVE
CALCIUM SERPL-MCNC: 8 MG/DL (ref 8.3–10.6)
CHLORIDE SERPL-SCNC: 106 MMOL/L (ref 99–110)
CO2 SERPL-SCNC: 27 MMOL/L (ref 21–32)
CREAT SERPL-MCNC: 1 MG/DL (ref 0.6–1.2)
DEPRECATED RDW RBC AUTO: 15.3 % (ref 12.4–15.4)
EOSINOPHIL # BLD: 0.1 K/UL (ref 0–0.6)
EOSINOPHIL NFR BLD: 2 %
GFR SERPLBLD CREATININE-BSD FMLA CKD-EPI: 54 ML/MIN/{1.73_M2}
GLUCOSE SERPL-MCNC: 103 MG/DL (ref 70–99)
HCT VFR BLD AUTO: 24.8 % (ref 36–48)
HGB BLD-MCNC: 8.4 G/DL (ref 12–16)
HYPOCHROMIA BLD QL SMEAR: ABNORMAL
LYMPHOCYTES # BLD: 1.4 K/UL (ref 1–5.1)
LYMPHOCYTES NFR BLD: 21 %
MCH RBC QN AUTO: 31 PG (ref 26–34)
MCHC RBC AUTO-ENTMCNC: 33.9 G/DL (ref 31–36)
MCV RBC AUTO: 91.4 FL (ref 80–100)
METAMYELOCYTES NFR BLD MANUAL: 1 %
MONOCYTES # BLD: 0.6 K/UL (ref 0–1.3)
MONOCYTES NFR BLD: 9 %
N GONORRHOEA DNA UR QL NAA+PROBE: NEGATIVE
N GONORRHOEA DNA UR QL NAA+PROBE: NEGATIVE
NEUTROPHILS # BLD: 4.5 K/UL (ref 1.7–7.7)
NEUTROPHILS NFR BLD: 66 %
NEUTS BAND NFR BLD MANUAL: 1 % (ref 0–7)
PHOSPHATE SERPL-MCNC: 2.2 MG/DL (ref 2.5–4.9)
PLATELET # BLD AUTO: 201 K/UL (ref 135–450)
PLATELET BLD QL SMEAR: ADEQUATE
PMV BLD AUTO: 7.6 FL (ref 5–10.5)
POIKILOCYTOSIS BLD QL SMEAR: ABNORMAL
POTASSIUM SERPL-SCNC: 4 MMOL/L (ref 3.5–5.1)
POTASSIUM SERPL-SCNC: ABNORMAL MMOL/L (ref 3.5–5.1)
RBC # BLD AUTO: 2.71 M/UL (ref 4–5.2)
SLIDE REVIEW: ABNORMAL
SODIUM SERPL-SCNC: 139 MMOL/L (ref 136–145)
WBC # BLD AUTO: 6.6 K/UL (ref 4–11)

## 2023-10-03 PROCEDURE — 97110 THERAPEUTIC EXERCISES: CPT

## 2023-10-03 PROCEDURE — 94760 N-INVAS EAR/PLS OXIMETRY 1: CPT

## 2023-10-03 PROCEDURE — 6370000000 HC RX 637 (ALT 250 FOR IP): Performed by: ORTHOPAEDIC SURGERY

## 2023-10-03 PROCEDURE — 2580000003 HC RX 258: Performed by: INTERNAL MEDICINE

## 2023-10-03 PROCEDURE — 2500000003 HC RX 250 WO HCPCS: Performed by: INTERNAL MEDICINE

## 2023-10-03 PROCEDURE — 2580000003 HC RX 258: Performed by: ORTHOPAEDIC SURGERY

## 2023-10-03 PROCEDURE — 36415 COLL VENOUS BLD VENIPUNCTURE: CPT

## 2023-10-03 PROCEDURE — 1200000000 HC SEMI PRIVATE

## 2023-10-03 PROCEDURE — 85025 COMPLETE CBC W/AUTO DIFF WBC: CPT

## 2023-10-03 PROCEDURE — 80069 RENAL FUNCTION PANEL: CPT

## 2023-10-03 PROCEDURE — 97530 THERAPEUTIC ACTIVITIES: CPT

## 2023-10-03 PROCEDURE — 6360000002 HC RX W HCPCS: Performed by: ORTHOPAEDIC SURGERY

## 2023-10-03 RX ADMIN — DOXYCYCLINE 100 MG: 100 INJECTION, POWDER, LYOPHILIZED, FOR SOLUTION INTRAVENOUS at 18:46

## 2023-10-03 RX ADMIN — ACETAMINOPHEN 650 MG: 325 TABLET ORAL at 18:41

## 2023-10-03 RX ADMIN — ONDANSETRON 4 MG: 2 INJECTION INTRAMUSCULAR; INTRAVENOUS at 10:00

## 2023-10-03 RX ADMIN — TRAMADOL HYDROCHLORIDE 100 MG: 50 TABLET ORAL at 05:05

## 2023-10-03 RX ADMIN — SODIUM CHLORIDE, PRESERVATIVE FREE 10 ML: 5 INJECTION INTRAVENOUS at 20:55

## 2023-10-03 RX ADMIN — CASTOR OIL AND BALSAM, PERU: 788; 87 OINTMENT TOPICAL at 20:55

## 2023-10-03 RX ADMIN — ACETAMINOPHEN 650 MG: 325 TABLET ORAL at 05:00

## 2023-10-03 RX ADMIN — BUSPIRONE HYDROCHLORIDE 10 MG: 10 TABLET ORAL at 20:55

## 2023-10-03 RX ADMIN — ACETAMINOPHEN 650 MG: 325 TABLET ORAL at 00:18

## 2023-10-03 RX ADMIN — CASTOR OIL AND BALSAM, PERU: 788; 87 OINTMENT TOPICAL at 10:00

## 2023-10-03 RX ADMIN — ACETAMINOPHEN 650 MG: 325 TABLET ORAL at 13:42

## 2023-10-03 RX ADMIN — BUSPIRONE HYDROCHLORIDE 10 MG: 10 TABLET ORAL at 10:00

## 2023-10-03 RX ADMIN — SERTRALINE 50 MG: 50 TABLET, FILM COATED ORAL at 20:55

## 2023-10-03 RX ADMIN — BUSPIRONE HYDROCHLORIDE 10 MG: 10 TABLET ORAL at 13:42

## 2023-10-03 RX ADMIN — DOXYCYCLINE 100 MG: 100 INJECTION, POWDER, LYOPHILIZED, FOR SOLUTION INTRAVENOUS at 04:59

## 2023-10-03 RX ADMIN — PANTOPRAZOLE SODIUM 40 MG: 40 TABLET, DELAYED RELEASE ORAL at 05:00

## 2023-10-03 RX ADMIN — RISPERIDONE 1 MG: 1 TABLET ORAL at 20:55

## 2023-10-03 RX ADMIN — Medication 6 MG: at 20:54

## 2023-10-03 ASSESSMENT — PAIN SCALES - PAIN ASSESSMENT IN ADVANCED DEMENTIA (PAINAD)
BODYLANGUAGE: 0
BREATHING: 0
BREATHING: 0
NEGVOCALIZATION: 0
BREATHING: 0
NEGVOCALIZATION: 0
TOTALSCORE: 0
CONSOLABILITY: 0
NEGVOCALIZATION: 0
FACIALEXPRESSION: 0
CONSOLABILITY: 0
TOTALSCORE: 0
FACIALEXPRESSION: 0
TOTALSCORE: 0
FACIALEXPRESSION: 0
CONSOLABILITY: 0
BODYLANGUAGE: 0
BODYLANGUAGE: 0

## 2023-10-03 ASSESSMENT — PAIN DESCRIPTION - DESCRIPTORS
DESCRIPTORS: ACHING
DESCRIPTORS: ACHING

## 2023-10-03 ASSESSMENT — PAIN SCALES - GENERAL
PAINLEVEL_OUTOF10: 4
PAINLEVEL_OUTOF10: 7
PAINLEVEL_OUTOF10: 2
PAINLEVEL_OUTOF10: 2

## 2023-10-03 ASSESSMENT — PAIN SCALES - WONG BAKER
WONGBAKER_NUMERICALRESPONSE: 0

## 2023-10-03 ASSESSMENT — PAIN DESCRIPTION - LOCATION
LOCATION: BACK
LOCATION: LEG

## 2023-10-03 ASSESSMENT — PAIN DESCRIPTION - ORIENTATION: ORIENTATION: LEFT

## 2023-10-03 NOTE — PLAN OF CARE
Problem: Discharge Planning  Goal: Discharge to home or other facility with appropriate resources  Outcome: Progressing  Flowsheets (Taken 10/2/2023 1956)  Discharge to home or other facility with appropriate resources:   Identify barriers to discharge with patient and caregiver   Arrange for needed discharge resources and transportation as appropriate   Identify discharge learning needs (meds, wound care, etc)   Arrange for interpreters to assist at discharge as needed   Refer to discharge planning if patient needs post-hospital services based on physician order or complex needs related to functional status, cognitive ability or social support system

## 2023-10-03 NOTE — PROGRESS NOTES
Occupational Therapy  Facility/Department: [address] ORTHOPEDICS  Occupational Therapy Treatment Note    Name: Pedro Ramsey  : 1935  MRN: [id number]  Date of Service: 10/3/2023    Discharge Recommendations:  3-5 sessions per week, Patient would benefit from continued therapy after discharge        Pedro Ramsey scored a 9/24 on the AM-PAC ADL Inpatient form. Current research shows that an AM-PAC score of 17 or less is typically not associated with a discharge to the patient's home setting. Based on the patient's AM-PAC score and their current ADL deficits, it is recommended that the patient have 3-5 sessions per week of Occupational Therapy at d/c to increase the patient's independence. Please see assessment section for further patient specific details. If patient discharges prior to next session this note will serve as a discharge summary. Please see below for the latest assessment towards goals. Patient Diagnosis(es): The primary encounter diagnosis was Closed displaced intertrochanteric fracture of left femur, initial encounter ([address]). Diagnoses of ZAC (acute kidney injury) ([address]) and Bruising were also pertinent to this visit. Past Medical History:  has a past medical history of Allergic rhinitis, Anxiety, generalized, Arthritis, Chronic dermatitis of hands, Dementia ([address]), Depression, Full dentures, Hypertension, essential, benign, Impetigo, Intracervical pessary, Lumbar stenosis, Plantar fasciitis, Right-sided low back pain without sciatica, Thrush, Tobacco use disorder, and Uterus prolapse. Past Surgical History:  has a past surgical history that includes Bunionectomy (Left, 2019); Bunionectomy (Left, 3/11/2019); and Femur fracture surgery (Left, 2023). Treatment Diagnosis: impaired ADL/fxl mobility    Assessment   Performance deficits / Impairments: Decreased functional mobility ; Decreased safe awareness;Decreased balance;Decreased ADL status; Decreased cognition;Decreased coordination;Decreased ROM; Decreased endurance;Decreased high-level IADLs;Decreased strength  Assessment: 79 yo female admitted for a fall with L intertrochanteric fx s/p 9/27 L femoral nailing now TTWB. Post Op complicated by critically low H&H requiring transfusions. PMH: Dementia, Lumbar stenosis, HTN, Depression, L humerus fx. PTA, pt lived in [address] [place] [address] with ADL and IADL assist, however, IND with fxl mobility with RW. Today, pt functioning well below this baseline limited by pain, decreased strength, decreased cognition, and TTWB. Pt requiring simple one steps commands with increased time for tasks. Pt required Max A x2 for fei rolling to place linh lift pads and Total A for EOB>recliner via linh lift. Pt requiring PROM stretcher BUE shoulders, d/t significant weakness with attempting AROM. ABle to complete BUE elbow flexion/extension exercises with increased time and fatigue. Cont to anticipate Mod A feeding/grooming, Max A UB bathing/dressing and Total A LB ADL and toileting based on balance, endurance, cognition, strength and PLOF. Cont acute OT to address above deficits. At this time, pt is below baseline and would benefit from further skilled OT at low frequency, pending progress and ability to manage TTWB status. Treatment Diagnosis: impaired ADL/fxl mobility  Prognosis: Poor; Fair  REQUIRES OT FOLLOW-UP: Yes  Activity Tolerance  Activity Tolerance: Patient limited by fatigue;Patient limited by pain;Treatment limited secondary to decreased cognition  Activity Tolerance Comments: alert throughout, however, drowsiness        Plan   Occupational Therapy Plan  Times Per Week: 2-3  Current Treatment Recommendations: Balance training, Functional mobility training, Endurance training, Pain management, Safety education & training, Positioning, Patient/Caregiver education & training, Self-Care / ADL, Home management training, Modalities

## 2023-10-03 NOTE — PROGRESS NOTES
Bedside introduction completed. Patient alert to self and resting comfortably. Call light in reach and patient reoriented on use.

## 2023-10-03 NOTE — PLAN OF CARE
Problem: Safety - Adult  Goal: Free from fall injury  10/3/2023 1206 by Sultana Rodarte, RN  Outcome: Progressing  10/2/2023 2306 by Nichelle Cr RN  Outcome: Progressing  Flowsheets (Taken 10/2/2023 1125 by Huyen Main, RN)  Free From Fall Injury:   Instruct family/caregiver on patient safety   Based on caregiver fall risk screen, instruct family/caregiver to ask for assistance with transferring infant if caregiver noted to have fall risk factors     Problem: Pain  Goal: Verbalizes/displays adequate comfort level or baseline comfort level  Outcome: Progressing
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
## 2023-10-03 NOTE — PROGRESS NOTES
Complete bed change, both dressing changed, campbell care , patient complain of back pain, medicated with tramadol in addition to scheduled tylenol.

## 2023-10-03 NOTE — PROGRESS NOTES
[employer] message sent to Dr. Lorraine Carver to inquire about patients heparin dose due soon, as dose was held last night and this AM.  Order received to hold medication
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
## 2023-10-03 NOTE — PROGRESS NOTES
Patient remains confused, asking frequently to go home, failed attempt to re orient,  forgetful. Garcia in place, dressing on thigh an hip with some old drainage, will change dressing once it is 75% soaked. Shreya Morillo

## 2023-10-03 NOTE — PROGRESS NOTES
Physical Therapy  Facility/Department: [facility]  Daily Treatment Note  Co Treat with OT    This note serves as patient discharge summary if pt discharges prior to next PT visit      Name: Hafsa Found  : 1935  MRN: [id number]  Date of Service: 10/3/2023    Discharge Recommendations:  Continue to assess pending progress, Patient would benefit from continued therapy after discharge (3-5)          Patient Diagnosis(es): The primary encounter diagnosis was Closed displaced intertrochanteric fracture of left femur, initial encounter ([address]). Diagnoses of ZAC (acute kidney injury) ([address]) and Bruising were also pertinent to this visit. Past Medical History:  has a past medical history of Allergic rhinitis, Anxiety, generalized, Arthritis, Chronic dermatitis of hands, Dementia ([address]), Depression, Full dentures, Hypertension, essential, benign, Impetigo, Intracervical pessary, Lumbar stenosis, Plantar fasciitis, Right-sided low back pain without sciatica, Thrush, Tobacco use disorder, and Uterus prolapse. Past Surgical History:  has a past surgical history that includes Bunionectomy (Left, 2019); Bunionectomy (Left, 3/11/2019); and Femur fracture surgery (Left, 2023). Assessment   Body Structures, Functions, Activity Limitations Requiring Skilled Therapeutic Intervention: Decreased functional mobility ; Decreased ADL status; Decreased safe awareness;Decreased cognition;Decreased endurance  Assessment: Status 10-3-23 sesion: Bed Mobility Max-Dependent of 2. Transferred from bed to [facility] chair via [employer]. Patient performs AAROM  B LEs with Fair tolerance/participation. Falling into light sleep at rest in [facility] chair. Also reports lowback pain. Will continue to see patient as co treat with OT, and progress to tolerance. Do not believe patient will be able to progress to ambulation due to ortho restriction of L LE TTWBng.  Will progress toward WC mobility as tolerated.  She francisco benefit from

## 2023-10-03 NOTE — PROGRESS NOTES
Bedside report performed with Johanny Lei RN. Patient boosted up in bed and turned for comfort. Patient currently clean of stool and campbell intact. /64, all other VSS. Patient oriented to self only. Bed in locked and lowest position with alarm in place. Call light and side table within reach.     Matt Murrieta RN

## 2023-10-03 NOTE — PROGRESS NOTES
Comprehensive Nutrition Assessment    Type and Reason for Visit:  Initial, RD Nutrition Re-Screen/LOS    Nutrition Recommendations/Plan:   Regular diet  Encourage Ensure in between meals, can take with medications  Magic cup bid, can also offer with medications, have as dessert  Will monitor nutritional adequacy, nutrition-related labs, weights, BMs, and clinical progress      Malnutrition Assessment:  Malnutrition Status: At risk for malnutrition (Comment) (10/03/23 1501)    Context:          Nutrition Assessment:    Nutrition risk triggered due to weight loss and decreased appetite. Currently on a regular diet with Ensure bid. Patient arrived from SNF with UTI. Also with dementia and depression. Also with left hip fx, ORIF on 9/27/23. Po intake variable % meals. Patient unable to answer questions at time of visit. RD encouraged patient to let go of campbell catheter. RD informed nurse as well. At risk for nutrition decline. RD ordered Magic cups bid due to parital intake of Ensure noted on visit. Will monitor nutrition progress and goals of care. Nutrition Related Findings:    BUN 25 this am; last BM on 10/1 Wound Type: Surgical Incision       Current Nutrition Intake & Therapies:    Average Meal Intake: 1-25%  Average Supplements Intake: 51-75%  ADULT DIET; Regular  ADULT ORAL NUTRITION SUPPLEMENT; Breakfast, Dinner; Standard High Calorie/High Protein Oral Supplement  ADULT ORAL NUTRITION SUPPLEMENT; Lunch, Dinner; Frozen Oral Supplement    Anthropometric Measures:  Height: 5' 1\" (154.9 cm)  Ideal Body Weight (IBW): 105 lbs (48 kg)       Current Body Weight: 177 lb 4 oz (80.4 kg),   IBW. Weight Source: Bed Scale  Current BMI (kg/m2): 33.5                          BMI Categories: Obese Class 1 (BMI 30.0-34. 9)    Estimated Daily Nutrient Needs:  Energy Requirements Based On: Kcal/kg  Weight Used for Energy Requirements: Current  Energy (kcal/day): 5415-1945 (22-25 kcal/80.4 kg)  Weight Used for Protein Requirements: Current  Protein (g/day): 80-96 (1-1.2 g/80.4 kg)  Method Used for Fluid Requirements: 1 ml/kcal  Fluid (ml/day):      Nutrition Diagnosis:   Increased nutrient needs related to increase demand for energy/nutrients as evidenced by  (advanced age, extended hospital stay)    Nutrition Interventions:   Food and/or Nutrient Delivery: Continue Current Diet, Start Oral Nutrition Supplement, Continue Oral Nutrition Supplement     Coordination of Nutrition Care: Continue to monitor while inpatient       Goals:     Goals: PO intake 75% or greater       Nutrition Monitoring and Evaluation:      Food/Nutrient Intake Outcomes: Food and Nutrient Intake, Supplement Intake  Physical Signs/Symptoms Outcomes: Nutrition Focused Physical Findings, Biochemical Data    Discharge Planning:    Continue Oral Nutrition Supplement, Continue current diet     ROXY, [address] [place] [address],   Contact: Office: [phone];  [address] [place] [address] Road: [id number]

## 2023-10-03 NOTE — PLAN OF CARE
Problem: Safety - Adult  Goal: Free from fall injury  Outcome: Progressing  Flowsheets (Taken 10/2/2023 1125 by Lui Sen RN)  Free From Fall Injury:   Instruct family/caregiver on patient safety   Based on caregiver fall risk screen, instruct family/caregiver to ask for assistance with transferring infant if caregiver noted to have fall risk factors

## 2023-10-04 LAB
ALBUMIN SERPL-MCNC: 2.6 G/DL (ref 3.4–5)
ANION GAP SERPL CALCULATED.3IONS-SCNC: 6 MMOL/L (ref 3–16)
ANISOCYTOSIS BLD QL SMEAR: ABNORMAL
BASOPHILS # BLD: 0 K/UL (ref 0–0.2)
BASOPHILS NFR BLD: 0 %
BUN SERPL-MCNC: 25 MG/DL (ref 7–20)
CALCIUM SERPL-MCNC: 7.7 MG/DL (ref 8.3–10.6)
CHLORIDE SERPL-SCNC: 106 MMOL/L (ref 99–110)
CO2 SERPL-SCNC: 27 MMOL/L (ref 21–32)
CREAT SERPL-MCNC: 1 MG/DL (ref 0.6–1.2)
DEPRECATED RDW RBC AUTO: 15.6 % (ref 12.4–15.4)
EOSINOPHIL # BLD: 0.2 K/UL (ref 0–0.6)
EOSINOPHIL NFR BLD: 3 %
GFR SERPLBLD CREATININE-BSD FMLA CKD-EPI: 54 ML/MIN/{1.73_M2}
GLUCOSE SERPL-MCNC: 100 MG/DL (ref 70–99)
HCT VFR BLD AUTO: 25.7 % (ref 36–48)
HGB BLD-MCNC: 8.6 G/DL (ref 12–16)
LYMPHOCYTES # BLD: 1 K/UL (ref 1–5.1)
LYMPHOCYTES NFR BLD: 15 %
MCH RBC QN AUTO: 30.5 PG (ref 26–34)
MCHC RBC AUTO-ENTMCNC: 33.4 G/DL (ref 31–36)
MCV RBC AUTO: 91.2 FL (ref 80–100)
METAMYELOCYTES NFR BLD MANUAL: 1 %
MONOCYTES # BLD: 0.5 K/UL (ref 0–1.3)
MONOCYTES NFR BLD: 7 %
NEUTROPHILS # BLD: 5.1 K/UL (ref 1.7–7.7)
NEUTROPHILS NFR BLD: 74 %
NT-PROBNP SERPL-MCNC: 1330 PG/ML (ref 0–449)
PHOSPHATE SERPL-MCNC: 2.6 MG/DL (ref 2.5–4.9)
PLATELET # BLD AUTO: 242 K/UL (ref 135–450)
PMV BLD AUTO: 7.6 FL (ref 5–10.5)
POLYCHROMASIA BLD QL SMEAR: ABNORMAL
POTASSIUM SERPL-SCNC: 4.2 MMOL/L (ref 3.5–5.1)
POTASSIUM SERPL-SCNC: 4.2 MMOL/L (ref 3.5–5.1)
RBC # BLD AUTO: 2.82 M/UL (ref 4–5.2)
SODIUM SERPL-SCNC: 139 MMOL/L (ref 136–145)
TOXIC GRANULES BLD QL SMEAR: PRESENT
WBC # BLD AUTO: 6.8 K/UL (ref 4–11)

## 2023-10-04 PROCEDURE — 36415 COLL VENOUS BLD VENIPUNCTURE: CPT

## 2023-10-04 PROCEDURE — 6370000000 HC RX 637 (ALT 250 FOR IP): Performed by: FAMILY MEDICINE

## 2023-10-04 PROCEDURE — 2580000003 HC RX 258: Performed by: ORTHOPAEDIC SURGERY

## 2023-10-04 PROCEDURE — 1200000000 HC SEMI PRIVATE

## 2023-10-04 PROCEDURE — 6370000000 HC RX 637 (ALT 250 FOR IP): Performed by: ORTHOPAEDIC SURGERY

## 2023-10-04 PROCEDURE — 85025 COMPLETE CBC W/AUTO DIFF WBC: CPT

## 2023-10-04 PROCEDURE — 80069 RENAL FUNCTION PANEL: CPT

## 2023-10-04 PROCEDURE — 2500000003 HC RX 250 WO HCPCS: Performed by: INTERNAL MEDICINE

## 2023-10-04 PROCEDURE — 2580000003 HC RX 258: Performed by: INTERNAL MEDICINE

## 2023-10-04 PROCEDURE — 83880 ASSAY OF NATRIURETIC PEPTIDE: CPT

## 2023-10-04 RX ORDER — DOXYCYCLINE HYCLATE 100 MG
100 TABLET ORAL 2 TIMES DAILY
Qty: 8 TABLET | Refills: 0 | Status: SHIPPED | OUTPATIENT
Start: 2023-10-04 | End: 2023-10-08

## 2023-10-04 RX ORDER — METRONIDAZOLE 500 MG/1
500 TABLET ORAL EVERY 8 HOURS SCHEDULED
Qty: 21 TABLET | Refills: 0 | Status: SHIPPED | OUTPATIENT
Start: 2023-10-04 | End: 2023-10-11

## 2023-10-04 RX ORDER — METRONIDAZOLE 500 MG/1
500 TABLET ORAL EVERY 8 HOURS SCHEDULED
Status: DISCONTINUED | OUTPATIENT
Start: 2023-10-04 | End: 2023-10-05 | Stop reason: HOSPADM

## 2023-10-04 RX ADMIN — CASTOR OIL AND BALSAM, PERU: 788; 87 OINTMENT TOPICAL at 09:17

## 2023-10-04 RX ADMIN — TRAMADOL HYDROCHLORIDE 100 MG: 50 TABLET ORAL at 18:01

## 2023-10-04 RX ADMIN — BUSPIRONE HYDROCHLORIDE 10 MG: 10 TABLET ORAL at 09:17

## 2023-10-04 RX ADMIN — Medication 6 MG: at 22:17

## 2023-10-04 RX ADMIN — ACETAMINOPHEN 650 MG: 325 TABLET ORAL at 05:29

## 2023-10-04 RX ADMIN — DOXYCYCLINE 100 MG: 100 INJECTION, POWDER, LYOPHILIZED, FOR SOLUTION INTRAVENOUS at 05:31

## 2023-10-04 RX ADMIN — METRONIDAZOLE 500 MG: 500 TABLET ORAL at 22:09

## 2023-10-04 RX ADMIN — ACETAMINOPHEN 650 MG: 325 TABLET ORAL at 18:01

## 2023-10-04 RX ADMIN — BUSPIRONE HYDROCHLORIDE 10 MG: 10 TABLET ORAL at 22:09

## 2023-10-04 RX ADMIN — SERTRALINE 50 MG: 50 TABLET, FILM COATED ORAL at 22:09

## 2023-10-04 RX ADMIN — DOXYCYCLINE 100 MG: 100 INJECTION, POWDER, LYOPHILIZED, FOR SOLUTION INTRAVENOUS at 18:05

## 2023-10-04 RX ADMIN — SODIUM CHLORIDE, PRESERVATIVE FREE 10 ML: 5 INJECTION INTRAVENOUS at 09:20

## 2023-10-04 RX ADMIN — RISPERIDONE 1 MG: 1 TABLET ORAL at 22:09

## 2023-10-04 RX ADMIN — ACETAMINOPHEN 650 MG: 325 TABLET ORAL at 12:34

## 2023-10-04 RX ADMIN — BUSPIRONE HYDROCHLORIDE 10 MG: 10 TABLET ORAL at 14:43

## 2023-10-04 RX ADMIN — METRONIDAZOLE 500 MG: 500 TABLET ORAL at 14:43

## 2023-10-04 ASSESSMENT — PAIN SCALES - PAIN ASSESSMENT IN ADVANCED DEMENTIA (PAINAD)
CONSOLABILITY: 0
FACIALEXPRESSION: 0
CONSOLABILITY: 0
TOTALSCORE: 0
TOTALSCORE: 0
CONSOLABILITY: 0
NEGVOCALIZATION: 0
BODYLANGUAGE: 0
BREATHING: 0
TOTALSCORE: 0
BREATHING: 0
FACIALEXPRESSION: 0
NEGVOCALIZATION: 0
BREATHING: 0
NEGVOCALIZATION: 0
FACIALEXPRESSION: 0

## 2023-10-04 ASSESSMENT — PAIN SCALES - GENERAL: PAINLEVEL_OUTOF10: 6

## 2023-10-04 NOTE — CARE COORDINATION
DISCHARGE SUMMARY     DATE OF DISCHARGE: 10/5/23    DISCHARGE DESTINATION: SNF    F[facility]ILITY: [facility]  PHONE NUMBER: Maxi NUMBER: [phone]    INSURANCE PRECERT OBTAINED: YES    KAY/NERY COMPLETED: NA    COVID RESULT: not required      TRANSPORTATION:     Company Name:  [employer] [facility] [employer] ID #  [place] up Time: 10am Th 10-5      Phone Number: [phone]      COMMENTS: Daughter aware and in agreement with plan.

## 2023-10-04 NOTE — CARE COORDINATION
10/04/23 1547   IMM Letter   IMM Letter given to Patient/Family/Significant other/Guardian/POA/by: Sergio   IMM Letter date given: 10/04/23   IMM Letter time given: 1802

## 2023-10-04 NOTE — CARE COORDINATION
Pt seen for follow-up. Sacral area is pink, blanches. Using venelex. Left heel has blanchable erythema. Using skin prep and heel boots. Cont current tx.  Kellie Anderson, MSN, RN, Abel Smith

## 2023-10-04 NOTE — PROGRESS NOTES
CLINICAL PHARMACY NOTE: MEDS TO BEDS    Retail pharmacy has been notified that the patient is either going to a SNF, ARU, or other inpatient facility. All prescriptions sent to the retail pharmacy for this patient will be kept on profile unless told otherwise.     10/04/23 3:06 PM

## 2023-10-04 NOTE — PROGRESS NOTES
[facility] ALCIRA Scanner Results  Pt was scanned according to 's recommendations. Daily      Site Reading Redness noted? Y/N   Right heel 0.1 n   Left heel  0.3 y   Sacrum 1.7 n       []  ALCIRA Delta score < 0.6 indicates normal, healthy tissue at this time. Continue to assess daily and implement routine pressure injury prevention measures using the Cr Order Set. Scan weekly on Wednesday. [x] ALCIRA Delta score > or = to 0.6 indicates at risk tissue. Implement target prevention interventions below and scan daily.     [x] Cr orders reviewed and updated if indicated  [x] Educated patient/family on importance of offloading/repositioning  [x] Patient agreeable to plan for pressure offloading/repositioning    Liquid barrier film wipes, Positioning wedge, Extra pillows, and Heel boot(s)      [] Nutrition consult made  [x] Nutrition consult not indicated at this time     [] Sacral foam border in place  [x] Sacral foam border not in place, barrier cream applied    [x] Low air loss mattress (or Isoflex blue/orange mattress) ordered/placed   [x] For heels, apply liquid skin barrier twice daily and ensure offloading with pillows or boots

## 2023-10-04 NOTE — CARE COORDINATION
PAYOR CARE COORDINATOR INFORMATION:    COMPANY:  [facility]  NAME[facility]ani [facility]:  [facility], .  Administrative Assist, Case Management  [phone]  Electronically signed by Roz Baca on 10/4/2023 at 7:06 AM

## 2023-10-05 VITALS
WEIGHT: 177.25 LBS | DIASTOLIC BLOOD PRESSURE: 55 MMHG | RESPIRATION RATE: 16 BRPM | HEART RATE: 60 BPM | OXYGEN SATURATION: 97 % | TEMPERATURE: 98.1 F | BODY MASS INDEX: 33.47 KG/M2 | SYSTOLIC BLOOD PRESSURE: 120 MMHG | HEIGHT: 61 IN

## 2023-10-05 LAB
ANION GAP SERPL CALCULATED.3IONS-SCNC: 6 MMOL/L (ref 3–16)
BASOPHILS # BLD: 0 K/UL (ref 0–0.2)
BASOPHILS NFR BLD: 0.3 %
BUN SERPL-MCNC: 28 MG/DL (ref 7–20)
CALCIUM SERPL-MCNC: 8.4 MG/DL (ref 8.3–10.6)
CHLORIDE SERPL-SCNC: 109 MMOL/L (ref 99–110)
CO2 SERPL-SCNC: 28 MMOL/L (ref 21–32)
CREAT SERPL-MCNC: 1 MG/DL (ref 0.6–1.2)
DEPRECATED RDW RBC AUTO: 15.7 % (ref 12.4–15.4)
EOSINOPHIL # BLD: 0.3 K/UL (ref 0–0.6)
EOSINOPHIL NFR BLD: 3.8 %
GFR SERPLBLD CREATININE-BSD FMLA CKD-EPI: 54 ML/MIN/{1.73_M2}
GLUCOSE SERPL-MCNC: 98 MG/DL (ref 70–99)
HCT VFR BLD AUTO: 26.9 % (ref 36–48)
HGB BLD-MCNC: 8.9 G/DL (ref 12–16)
LYMPHOCYTES # BLD: 1.4 K/UL (ref 1–5.1)
LYMPHOCYTES NFR BLD: 15.7 %
MCH RBC QN AUTO: 30.6 PG (ref 26–34)
MCHC RBC AUTO-ENTMCNC: 33.2 G/DL (ref 31–36)
MCV RBC AUTO: 92.2 FL (ref 80–100)
MONOCYTES # BLD: 0.9 K/UL (ref 0–1.3)
MONOCYTES NFR BLD: 10.4 %
NEUTROPHILS # BLD: 6.2 K/UL (ref 1.7–7.7)
NEUTROPHILS NFR BLD: 69.8 %
PLATELET # BLD AUTO: 264 K/UL (ref 135–450)
PMV BLD AUTO: 7.6 FL (ref 5–10.5)
POTASSIUM SERPL-SCNC: 4.2 MMOL/L (ref 3.5–5.1)
RBC # BLD AUTO: 2.92 M/UL (ref 4–5.2)
SODIUM SERPL-SCNC: 143 MMOL/L (ref 136–145)
WBC # BLD AUTO: 8.9 K/UL (ref 4–11)

## 2023-10-05 PROCEDURE — 6370000000 HC RX 637 (ALT 250 FOR IP): Performed by: ORTHOPAEDIC SURGERY

## 2023-10-05 PROCEDURE — 85025 COMPLETE CBC W/AUTO DIFF WBC: CPT

## 2023-10-05 PROCEDURE — 80048 BASIC METABOLIC PNL TOTAL CA: CPT

## 2023-10-05 PROCEDURE — 36415 COLL VENOUS BLD VENIPUNCTURE: CPT

## 2023-10-05 PROCEDURE — 2500000003 HC RX 250 WO HCPCS: Performed by: INTERNAL MEDICINE

## 2023-10-05 PROCEDURE — 2580000003 HC RX 258: Performed by: INTERNAL MEDICINE

## 2023-10-05 PROCEDURE — 6370000000 HC RX 637 (ALT 250 FOR IP): Performed by: FAMILY MEDICINE

## 2023-10-05 PROCEDURE — 2580000003 HC RX 258: Performed by: ORTHOPAEDIC SURGERY

## 2023-10-05 RX ADMIN — SODIUM CHLORIDE, PRESERVATIVE FREE 10 ML: 5 INJECTION INTRAVENOUS at 09:09

## 2023-10-05 RX ADMIN — TRAMADOL HYDROCHLORIDE 100 MG: 50 TABLET ORAL at 09:09

## 2023-10-05 RX ADMIN — DOXYCYCLINE 100 MG: 100 INJECTION, POWDER, LYOPHILIZED, FOR SOLUTION INTRAVENOUS at 06:52

## 2023-10-05 RX ADMIN — METRONIDAZOLE 500 MG: 500 TABLET ORAL at 06:43

## 2023-10-05 RX ADMIN — ACETAMINOPHEN 650 MG: 325 TABLET ORAL at 01:13

## 2023-10-05 RX ADMIN — CASTOR OIL AND BALSAM, PERU: 788; 87 OINTMENT TOPICAL at 01:16

## 2023-10-05 RX ADMIN — PANTOPRAZOLE SODIUM 40 MG: 40 TABLET, DELAYED RELEASE ORAL at 06:43

## 2023-10-05 RX ADMIN — BUSPIRONE HYDROCHLORIDE 10 MG: 10 TABLET ORAL at 09:09

## 2023-10-05 RX ADMIN — ACETAMINOPHEN 650 MG: 325 TABLET ORAL at 06:43

## 2023-10-05 ASSESSMENT — PAIN SCALES - GENERAL
PAINLEVEL_OUTOF10: 0
PAINLEVEL_OUTOF10: 8
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
## 2023-10-05 NOTE — PROGRESS NOTES
Patient resting quietly throughout the night. No complaints of pain. IV clean dry and intact. Tolerated pills well, crushed in apple sauce. Dressing changed once throughout the night. Call light within reach. Able to make needs known.      Electronically signed by Michael Ruiz RN on 10/5/2023 at 7:58 AM

## 2023-10-05 NOTE — PROGRESS NOTES
for DC planning, ECF planned per daughter.    Medical mgmt per hospitalist      Deo Carey, MESFIN - CNP  10/5/2023  9:57 AM

## 2023-10-10 NOTE — DISCHARGE SUMMARY
results for input(s): \"AST\", \"ALT\", \"ALB\", \"BILITOT\", \"ALKPHOS\" in the last 72 hours. Lipids: No results found for: \"CHOL\", \"HDL\", \"TRIG\"  Hemoglobin A1C: No results found for: \"LABA1C\"  TSH:   Lab Results   Component Value Date/Time    TSH 2.28 07/31/2017 01:51 PM     Troponin: No results found for: \"TROPONINT\"  Lactic Acid: No results for input(s): \"LACTA\" in the last 72 hours. BNP: No results for input(s): \"PROBNP\" in the last 72 hours.   UA:  Lab Results   Component Value Date/Time    NITRU Negative 04/18/2023 10:54 AM    COLORU Yellow 04/18/2023 10:54 AM    PHUR 6.0 04/18/2023 10:54 AM    WBCUA 8 12/22/2020 10:00 PM    RBCUA 1 12/22/2020 10:00 PM    BACTERIA 1+ 12/22/2020 10:00 PM    CLARITYU Clear 04/18/2023 10:54 AM    SPECGRAV 1.015 04/18/2023 10:54 AM    LEUKOCYTESUR Negative 04/18/2023 10:54 AM    UROBILINOGEN 0.2 04/18/2023 10:54 AM    BILIRUBINUR Negative 04/18/2023 10:54 AM    BILIRUBINUR neg 06/19/2017 05:33 PM    BLOODU Negative 04/18/2023 10:54 AM    GLUCOSEU Negative 04/18/2023 10:54 AM    KETUA Negative 04/18/2023 10:54 AM     Urine Cultures:   Lab Results   Component Value Date/Time    LABURIN No growth at 18-36 hours 08/31/2017 12:07 PM     Blood Cultures: No results found for: \"BC\"  No results found for: \"BLOODCULT2\"  Organism: No results found for: \"ORG\"    Time Spent Discharging patient 45 minutes    Electronically signed by Samantha Smith MD on 10/10/2023 at 1:06 AM

## 2023-10-19 ENCOUNTER — OFFICE VISIT (OUTPATIENT)
Dept: ORTHOPEDIC SURGERY | Age: 88
End: 2023-10-19

## 2023-10-19 VITALS — WEIGHT: 177 LBS | BODY MASS INDEX: 33.42 KG/M2 | HEIGHT: 61 IN

## 2023-10-19 DIAGNOSIS — S72.142D CLOSED INTERTROCHANTERIC FRACTURE, LEFT, WITH ROUTINE HEALING, SUBSEQUENT ENCOUNTER: Primary | ICD-10-CM

## 2023-10-19 DIAGNOSIS — Z98.890 STATUS POST OPEN REDUCTION AND INTERNAL FIXATION (ORIF) OF FRACTURE: ICD-10-CM

## 2023-10-19 DIAGNOSIS — Z87.81 STATUS POST OPEN REDUCTION AND INTERNAL FIXATION (ORIF) OF FRACTURE: ICD-10-CM

## 2023-10-20 ENCOUNTER — TELEPHONE (OUTPATIENT)
Dept: ORTHOPEDIC SURGERY | Age: 88
End: 2023-10-20

## 2023-10-20 NOTE — TELEPHONE ENCOUNTER
[facility] Name: [address] [place] [address] Ave Name: Rosalind Jimenez Number: FAX# [phone]  Request or Information: OFFICE NOTE FROM 10/19 VISIT

## 2023-10-29 ENCOUNTER — HOSPITAL ENCOUNTER (EMERGENCY)
Age: 88
Discharge: HOME | End: 2023-10-30
Payer: COMMERCIAL

## 2023-10-29 DIAGNOSIS — S42.201A CLOSED FRACTURE OF PROXIMAL END OF RIGHT HUMERUS, INITIAL ENCOUNTER: Primary | ICD-10-CM
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
## 2023-10-29 PROCEDURE — 99284 EMERGENCY DEPT VISIT MOD MDM: CPT

## 2023-10-29 RX ORDER — MORPHINE SULFATE 4 MG/ML
4 INJECTION, SOLUTION INTRAMUSCULAR; INTRAVENOUS ONCE
Status: COMPLETED | OUTPATIENT
Start: 2023-10-30 | End: 2023-10-30

## 2023-10-29 RX ORDER — ONDANSETRON 2 MG/ML
4 INJECTION INTRAMUSCULAR; INTRAVENOUS ONCE
Status: COMPLETED | OUTPATIENT
Start: 2023-10-30 | End: 2023-10-30

## 2023-10-29 ASSESSMENT — PAIN SCALES - GENERAL: PAINLEVEL_OUTOF10: 10

## 2023-10-29 ASSESSMENT — PAIN - FUNCTIONAL ASSESSMENT: PAIN_FUNCTIONAL_ASSESSMENT: 0-10

## 2023-10-29 ASSESSMENT — PAIN DESCRIPTION - LOCATION: LOCATION: BACK;SHOULDER

## 2023-10-29 ASSESSMENT — PAIN DESCRIPTION - ORIENTATION: ORIENTATION: RIGHT

## 2023-10-29 ASSESSMENT — LIFESTYLE VARIABLES
HOW OFTEN DO YOU HAVE A DRINK CONTAINING ALCOHOL: MONTHLY OR LESS
HOW MANY STANDARD DRINKS CONTAINING ALCOHOL DO YOU HAVE ON A TYPICAL DAY: 1 OR 2

## 2023-10-30 ENCOUNTER — APPOINTMENT (OUTPATIENT)
Dept: GENERAL RADIOLOGY | Age: 88
End: 2023-10-30
Payer: COMMERCIAL

## 2023-10-30 VITALS
TEMPERATURE: 97.5 F | HEART RATE: 68 BPM | OXYGEN SATURATION: 99 % | HEIGHT: 61 IN | SYSTOLIC BLOOD PRESSURE: 128 MMHG | BODY MASS INDEX: 31.22 KG/M2 | DIASTOLIC BLOOD PRESSURE: 82 MMHG | WEIGHT: 165.34 LBS | RESPIRATION RATE: 14 BRPM

## 2023-10-30 LAB
ANION GAP SERPL CALCULATED.3IONS-SCNC: 10 MMOL/L (ref 3–16)
BASOPHILS # BLD: 0 K/UL (ref 0–0.2)
BASOPHILS NFR BLD: 0.3 %
BUN SERPL-MCNC: 21 MG/DL (ref 7–20)
CALCIUM SERPL-MCNC: 8.5 MG/DL (ref 8.3–10.6)
CHLORIDE SERPL-SCNC: 100 MMOL/L (ref 99–110)
CO2 SERPL-SCNC: 27 MMOL/L (ref 21–32)
CREAT SERPL-MCNC: 1.4 MG/DL (ref 0.6–1.2)
DEPRECATED RDW RBC AUTO: 16.2 % (ref 12.4–15.4)
EOSINOPHIL # BLD: 0.2 K/UL (ref 0–0.6)
EOSINOPHIL NFR BLD: 1.8 %
GFR SERPLBLD CREATININE-BSD FMLA CKD-EPI: 36 ML/MIN/{1.73_M2}
GLUCOSE SERPL-MCNC: 103 MG/DL (ref 70–99)
HCT VFR BLD AUTO: 33.5 % (ref 36–48)
HGB BLD-MCNC: 11 G/DL (ref 12–16)
LYMPHOCYTES # BLD: 1.7 K/UL (ref 1–5.1)
LYMPHOCYTES NFR BLD: 13.5 %
MCH RBC QN AUTO: 30.8 PG (ref 26–34)
MCHC RBC AUTO-ENTMCNC: 32.8 G/DL (ref 31–36)
MCV RBC AUTO: 94 FL (ref 80–100)
MONOCYTES # BLD: 1.5 K/UL (ref 0–1.3)
MONOCYTES NFR BLD: 11.9 %
NEUTROPHILS # BLD: 9.3 K/UL (ref 1.7–7.7)
NEUTROPHILS NFR BLD: 72.5 %
PLATELET # BLD AUTO: 183 K/UL (ref 135–450)
PMV BLD AUTO: 8.1 FL (ref 5–10.5)
POTASSIUM SERPL-SCNC: 4.3 MMOL/L (ref 3.5–5.1)
RBC # BLD AUTO: 3.57 M/UL (ref 4–5.2)
SODIUM SERPL-SCNC: 137 MMOL/L (ref 136–145)
WBC # BLD AUTO: 12.8 K/UL (ref 4–11)

## 2023-10-30 PROCEDURE — 85025 COMPLETE CBC W/AUTO DIFF WBC: CPT

## 2023-10-30 PROCEDURE — 6360000002 HC RX W HCPCS: Performed by: PHYSICIAN ASSISTANT

## 2023-10-30 PROCEDURE — 6370000000 HC RX 637 (ALT 250 FOR IP): Performed by: PHYSICIAN ASSISTANT

## 2023-10-30 PROCEDURE — 96374 THER/PROPH/DIAG INJ IV PUSH: CPT

## 2023-10-30 PROCEDURE — 73030 X-RAY EXAM OF SHOULDER: CPT

## 2023-10-30 PROCEDURE — 73080 X-RAY EXAM OF ELBOW: CPT

## 2023-10-30 PROCEDURE — 80048 BASIC METABOLIC PNL TOTAL CA: CPT

## 2023-10-30 PROCEDURE — 36415 COLL VENOUS BLD VENIPUNCTURE: CPT

## 2023-10-30 PROCEDURE — 96375 TX/PRO/DX INJ NEW DRUG ADDON: CPT

## 2023-10-30 PROCEDURE — 73502 X-RAY EXAM HIP UNI 2-3 VIEWS: CPT

## 2023-10-30 RX ORDER — HYDROCODONE BITARTRATE AND ACETAMINOPHEN 5; 325 MG/1; MG/1
1 TABLET ORAL EVERY 6 HOURS PRN
Qty: 15 TABLET | Refills: 0 | Status: SHIPPED | OUTPATIENT
Start: 2023-10-30 | End: 2023-11-03

## 2023-10-30 RX ORDER — HYDROCODONE BITARTRATE AND ACETAMINOPHEN 5; 325 MG/1; MG/1
1 TABLET ORAL ONCE
Status: COMPLETED | OUTPATIENT
Start: 2023-10-30 | End: 2023-10-30

## 2023-10-30 RX ADMIN — ONDANSETRON 4 MG: 2 INJECTION INTRAMUSCULAR; INTRAVENOUS at 00:15

## 2023-10-30 RX ADMIN — MORPHINE SULFATE 4 MG: 4 INJECTION, SOLUTION INTRAMUSCULAR; INTRAVENOUS at 00:17

## 2023-10-30 RX ADMIN — HYDROCODONE BITARTRATE AND ACETAMINOPHEN 1 TABLET: 5; 325 TABLET ORAL at 02:18

## 2023-10-30 ASSESSMENT — PAIN SCALES - GENERAL
PAINLEVEL_OUTOF10: 5
PAINLEVEL_OUTOF10: 10
PAINLEVEL_OUTOF10: 10

## 2023-10-30 ASSESSMENT — PAIN - FUNCTIONAL ASSESSMENT: PAIN_FUNCTIONAL_ASSESSMENT: 0-10
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
## 2023-10-30 NOTE — ED NOTES
Report given to Kayla López RN. SBAR discussed. All questions answered. RN verbalized understanding.       Jairo Ann RN  10/30/23 4117

## 2023-10-30 NOTE — ED NOTES
Applied sling to pt right arm. Tolerated well. Pt resting comfortably at bedside. No additional needs at this time.       Colt Wood RN  10/30/23 2013

## 2023-10-30 NOTE — DISCHARGE INSTRUCTIONS
Generally keep the immobilizer on and in place for the right upper extremity, use medication as written, and call orthopedics tomorrow to arrange close outpatient follow-up and further care and treatment. Return to the ER for any emergency worsening or concern.

## 2023-10-30 NOTE — ED NOTES
D/C: Order noted for d/c. Pt confirmed d/c paperwork 1 paperscript handed to Charla Baldwin w/ CMT have correct name. Discharge and education instructions reviewed with patient. Teach-back successful. Pt verbalized understanding and signed d/c papers. Pt denied questions at this time. No acute distress noted. Patient instructed to follow-up as noted - return to emergency department if symptoms worsen. Patient verbalized understanding. Discharged per EDMD with discharge instructions. Pt discharged w/ CMT. Patient stable upon departure. Thanked patient for choosing [facility] for care. Provider aware of patient pain at time of discharge.        Josephine Orona RN  10/30/23 0926

## 2023-10-30 NOTE — ED NOTES
Report given to [facility] KYLIE BAZZI. RN updated on pt discharge and eta of transport. All questions answered.       Vannessa Mcginnis RN  10/30/23 9579

## 2023-10-30 NOTE — ED NOTES
Handoff report received from Alessandra Collins; all questions and concerns answered; patient alert & oriented; in bed; VSS; respirations even and no distress noted at this time.       Harrison Montelongo, RN  10/30/23 1310 24 Rufus Arevalo RN  10/30/23 7857

## 2023-10-30 NOTE — ED NOTES
Patient's brief was changed d/t being solid; need warm blanket applied; patient rest comfortable and has no new needs at this time; patient's call light is w/n reach     Angela Sanchez RN  10/30/23 2607

## 2023-10-30 NOTE — ED PROVIDER NOTES
Generally keep the immobilizer on and in place for the right upper extremity, use medication as written, and call orthopedics tomorrow to arrange close outpatient follow-up and further care and treatment. Return to the ER for any emergency worsening or concern. The patient tolerated their visit well. I evaluated the patient. The physician was available for consultation as needed. The patient and / or the family were informed of the results of any tests, a time was given to answer questions, a plan was proposed and they agreed with plan. I am the Primary Clinician of Record. CLINICAL IMPRESSION:  1. Closed fracture of proximal end of right humerus, initial encounter    2. Accidental fall, initial encounter        DISPOSITION Decision To Discharge 10/30/2023 01:44:32 AM      PATIENT REFERRED TO:  Irma Vasquez MD  [facility]  [phone]    Call in 1 day        DISCHARGE MEDICATIONS:  New Prescriptions    HYDROCODONE-ACETAMINOPHEN (NORCO) 5-325 MG PER TABLET    Take 1 tablet by mouth every 6 hours as needed for Pain for up to 4 days. Intended supply: 3 days.  Take lowest dose possible to manage pain Max Daily Amount: 4 tablets       DISCONTINUED MEDICATIONS:  Discontinued Medications    No medications on file              (Please note the MDM and HPI sections of this note were completed with a voice recognition program.  Efforts were made to edit the dictations but occasionally words are mis-transcribed.)    Electronically signed, Serena Polanco PA-C,           [facility], [address], [place]  10/30/23 8925

## 2023-10-30 NOTE — ED NOTES
CMT at bedside getting report from [address] [place] [address], [address] [place] [address]  10/30/23 0768

## 2023-11-02 ENCOUNTER — OFFICE VISIT (OUTPATIENT)
Dept: ORTHOPEDIC SURGERY | Age: 88
End: 2023-11-02
Payer: COMMERCIAL

## 2023-11-02 VITALS — HEIGHT: 61 IN | BODY MASS INDEX: 31.24 KG/M2

## 2023-11-02 DIAGNOSIS — S72.142D CLOSED INTERTROCHANTERIC FRACTURE, LEFT, WITH ROUTINE HEALING, SUBSEQUENT ENCOUNTER: ICD-10-CM

## 2023-11-02 DIAGNOSIS — S42.201A CLOSED FRACTURE OF PROXIMAL END OF RIGHT HUMERUS, UNSPECIFIED FRACTURE MORPHOLOGY, INITIAL ENCOUNTER: Primary | ICD-10-CM

## 2023-11-02 PROCEDURE — 99214 OFFICE O/P EST MOD 30 MIN: CPT | Performed by: ORTHOPAEDIC SURGERY

## 2023-11-02 PROCEDURE — 1123F ACP DISCUSS/DSCN MKR DOCD: CPT | Performed by: ORTHOPAEDIC SURGERY

## 2023-11-02 PROCEDURE — 23600 CLTX PROX HUMRL FX W/O MNPJ: CPT | Performed by: ORTHOPAEDIC SURGERY

## 2023-11-09 ENCOUNTER — APPOINTMENT (OUTPATIENT)
Dept: GENERAL RADIOLOGY | Age: 88
End: 2023-11-09
Payer: COMMERCIAL

## 2023-11-09 ENCOUNTER — HOSPITAL ENCOUNTER (EMERGENCY)
Age: 88
Discharge: HOME | End: 2023-11-09
Payer: COMMERCIAL

## 2023-11-09 VITALS
SYSTOLIC BLOOD PRESSURE: 136 MMHG | TEMPERATURE: 98.2 F | HEART RATE: 87 BPM | RESPIRATION RATE: 15 BRPM | DIASTOLIC BLOOD PRESSURE: 59 MMHG | OXYGEN SATURATION: 94 %

## 2023-11-09 DIAGNOSIS — S70.00XA CONTUSION OF HIP, UNSPECIFIED LATERALITY, INITIAL ENCOUNTER: ICD-10-CM
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
## 2023-11-09 DIAGNOSIS — S42.291A CLOSED FRACTURE OF HEAD OF RIGHT HUMERUS, INITIAL ENCOUNTER: ICD-10-CM

## 2023-11-09 PROCEDURE — 73502 X-RAY EXAM HIP UNI 2-3 VIEWS: CPT

## 2023-11-09 PROCEDURE — 73060 X-RAY EXAM OF HUMERUS: CPT

## 2023-11-09 PROCEDURE — 99283 EMERGENCY DEPT VISIT LOW MDM: CPT

## 2023-11-09 ASSESSMENT — PAIN SCALES - PAIN ASSESSMENT IN ADVANCED DEMENTIA (PAINAD)
FACIALEXPRESSION: 1
NEGVOCALIZATION: 1
CONSOLABILITY: 2
BODYLANGUAGE: 0
TOTALSCORE: 4
BREATHING: 0

## 2023-11-09 ASSESSMENT — PAIN - FUNCTIONAL ASSESSMENT: PAIN_FUNCTIONAL_ASSESSMENT: PAIN ASSESSMENT IN ADVANCED DEMENTIA (PAINAD)

## 2023-11-09 ASSESSMENT — LIFESTYLE VARIABLES: HOW OFTEN DO YOU HAVE A DRINK CONTAINING ALCOHOL: NEVER

## 2023-11-10 ENCOUNTER — TELEPHONE (OUTPATIENT)
Dept: ORTHOPEDIC SURGERY | Age: 88
End: 2023-11-10

## 2023-11-10 NOTE — TELEPHONE ENCOUNTER
Did speak with patient's daughter regarding ED referral. Patient has appt 12/5 with Dr. Dyan Dorman, if anything is needed sooner will call.

## 2023-11-10 NOTE — ED NOTES
Discharge and education instructions reviewed. Patient verbalized understanding, teach-back successful. Patient denied questions at this time. No acute distress noted. Patient instructed to follow-up as noted - return to emergency department if symptoms worsen. Patient verbalized understanding. Discharged per EDMD with discharge instructions.         Medford Rubinstein, RN  11/09/23 0975

## 2023-12-05 ENCOUNTER — OFFICE VISIT (OUTPATIENT)
Dept: ORTHOPEDIC SURGERY | Age: 88
End: 2023-12-05

## 2023-12-05 VITALS — WEIGHT: 170 LBS | HEIGHT: 61 IN | BODY MASS INDEX: 32.1 KG/M2

## 2023-12-05 DIAGNOSIS — S72.142D CLOSED INTERTROCHANTERIC FRACTURE, LEFT, WITH ROUTINE HEALING, SUBSEQUENT ENCOUNTER: Primary | ICD-10-CM

## 2023-12-05 DIAGNOSIS — S42.201A CLOSED FRACTURE OF PROXIMAL END OF RIGHT HUMERUS, UNSPECIFIED FRACTURE MORPHOLOGY, INITIAL ENCOUNTER: ICD-10-CM

## 2023-12-05 PROCEDURE — 99024 POSTOP FOLLOW-UP VISIT: CPT | Performed by: ORTHOPAEDIC SURGERY

## 2024-01-10 ENCOUNTER — OFFICE VISIT (OUTPATIENT)
Dept: ORTHOPEDIC SURGERY | Age: 89
End: 2024-01-10
Payer: COMMERCIAL

## 2024-01-10 VITALS — HEIGHT: 61 IN | WEIGHT: 170 LBS | BODY MASS INDEX: 32.1 KG/M2

## 2024-01-10 DIAGNOSIS — S42.201A CLOSED FRACTURE OF PROXIMAL END OF RIGHT HUMERUS, UNSPECIFIED FRACTURE MORPHOLOGY, INITIAL ENCOUNTER: ICD-10-CM

## 2024-01-10 DIAGNOSIS — Z98.890 STATUS POST OPEN REDUCTION AND INTERNAL FIXATION (ORIF) OF FRACTURE: Primary | ICD-10-CM

## 2024-01-10 DIAGNOSIS — Z87.81 STATUS POST OPEN REDUCTION AND INTERNAL FIXATION (ORIF) OF FRACTURE: Primary | ICD-10-CM

## 2024-01-10 PROCEDURE — 99213 OFFICE O/P EST LOW 20 MIN: CPT | Performed by: ORTHOPAEDIC SURGERY

## 2024-01-10 PROCEDURE — 1123F ACP DISCUSS/DSCN MKR DOCD: CPT | Performed by: ORTHOPAEDIC SURGERY

## 2024-01-10 NOTE — PROGRESS NOTES
Jess Brand  [id number]  January 10, 2024    Chief Complaint   Patient presents with    Follow-up      S/P Left hip intramedullary nailing. DOS  9/27/23.  Rt proximal humerus Fx 10/30/23       History: The patient is an 88-year-old female who is here for evaluation of her left hip and her right shoulder.  The patient does have profound dementia and is with her daughter. She underwent a left hip trochanteric femoral nailing on 9/27.  She is now approximately 2-1/2 months post right proximal humerus fracture.  She is having minimal to no pain in the right shoulder.  She is having no pain in the left hip.        The patient's  past medical history, medications, allergies,  family history, social history, and have been reviewed, and dated and are recorded in the chart.  Pertinent items are noted in HPI.  Review of systems reviewed from Pertinent History Form dated on 10/19 and available in the patient's chart under the Media tab.     Vitals:  Ht 1.549 m (5' 1\")   Wt 77.1 kg (170 lb)   BMI 32.12 kg/m²     Physical: On examination today, the patient is pleasantly confused.  The patient's incision is clean and dry.  There is no sign of infection.  The small blister has healed. No sign of infection.  There is no evidence of DVT.  She is neurovascularly intact distally.  She has no pain with light rotation of the hip.  Examination of the right shoulder reveals mild swelling.  She has minimal tenderness to palpation over the proximal humerus.  She is able to lightly range the right elbow, wrist and hand.  Examination of the skin reveals no lesions or ulcerations.  She is neurovascularly intact distally.  We were able to lightly abduct the right shoulder without much difficulty.  We forward flex the right shoulder to approximately 140 degrees without much pain. there is no evidence of pseudo motion at the fracture site.    X-rays: AP pelvis and 2 views of the left hip obtained in the office today were extensively

## 2024-01-11 ENCOUNTER — TELEPHONE (OUTPATIENT)
Dept: ORTHOPEDIC SURGERY | Age: 89
End: 2024-01-11

## 2024-01-11 NOTE — TELEPHONE ENCOUNTER
General Question     Subject: NO PAPERWORK FROM [facility] - PLEASE FAX OVER TODAY   Patient and /or Facility Request: [facility]       1)    PLEASE FAX 01/10/24 OV NOTES/AVS TO     FAX  [phone] ATTEN [facility] MED RECORDS    2)       WHEN DOES Pt NEED TO F/U WITH DR PANCHAL?

## 2025-02-21 ENCOUNTER — APPOINTMENT (OUTPATIENT)
Age: 89
DRG: 640 | End: 2025-02-21
Attending: STUDENT IN AN ORGANIZED HEALTH CARE EDUCATION/TRAINING PROGRAM
Payer: COMMERCIAL

## 2025-02-21 ENCOUNTER — APPOINTMENT (OUTPATIENT)
Dept: GENERAL RADIOLOGY | Age: 89
DRG: 640 | End: 2025-02-21
Payer: COMMERCIAL

## 2025-02-21 ENCOUNTER — APPOINTMENT (OUTPATIENT)
Dept: CT IMAGING | Age: 89
DRG: 640 | End: 2025-02-21
Payer: COMMERCIAL

## 2025-02-21 ENCOUNTER — HOSPITAL ENCOUNTER (INPATIENT)
Age: 89
LOS: 4 days | Discharge: NURSING, REHAB OR LONG-TERM CARE | DRG: 640 | End: 2025-02-25
Attending: STUDENT IN AN ORGANIZED HEALTH CARE EDUCATION/TRAINING PROGRAM
Payer: COMMERCIAL

## 2025-02-21 DIAGNOSIS — I95.9 HYPOTENSION, UNSPECIFIED HYPOTENSION TYPE: ICD-10-CM

## 2025-02-21 DIAGNOSIS — I50.810 RIGHT-SIDED CONGESTIVE HEART FAILURE, UNSPECIFIED HF CHRONICITY (HCC): ICD-10-CM

## 2025-02-21 DIAGNOSIS — N17.9 AKI (ACUTE KIDNEY INJURY): ICD-10-CM

## 2025-02-21 DIAGNOSIS — Z71.89 GOALS OF CARE, COUNSELING/DISCUSSION: ICD-10-CM

## 2025-02-21 DIAGNOSIS — R09.89 PULMONARY CONGESTION: ICD-10-CM

## 2025-02-21 DIAGNOSIS — R09.02 HYPOXIA: Primary | ICD-10-CM

## 2025-02-21 DIAGNOSIS — K80.20 GALLSTONES: ICD-10-CM

## 2025-02-21 PROBLEM — R57.9 SHOCK (HCC): Status: ACTIVE | Noted: 2025-02-21

## 2025-02-21 LAB
ALBUMIN SERPL-MCNC: 3.1 G/DL (ref 3.4–5)
ALBUMIN/GLOB SERPL: 1 {RATIO} (ref 1.1–2.2)
ALP SERPL-CCNC: 125 U/L (ref 40–129)
ALT SERPL-CCNC: 16 U/L (ref 10–40)
ANION GAP SERPL CALCULATED.3IONS-SCNC: 10 MMOL/L (ref 3–16)
ANION GAP SERPL CALCULATED.3IONS-SCNC: 10 MMOL/L (ref 3–16)
AST SERPL-CCNC: 22 U/L (ref 15–37)
BASE EXCESS BLDV CALC-SCNC: 1.7 MMOL/L (ref -3–3)
BASOPHILS # BLD: 0 K/UL (ref 0–0.2)
BASOPHILS # BLD: 0 K/UL (ref 0–0.2)
BASOPHILS NFR BLD: 0.5 %
BASOPHILS NFR BLD: 0.7 %
BILIRUB SERPL-MCNC: 0.4 MG/DL (ref 0–1)
BILIRUB UR QL STRIP.AUTO: NEGATIVE
BUN SERPL-MCNC: 33 MG/DL (ref 7–20)
BUN SERPL-MCNC: 34 MG/DL (ref 7–20)
CALCIUM SERPL-MCNC: 9 MG/DL (ref 8.3–10.6)
CALCIUM SERPL-MCNC: 9.2 MG/DL (ref 8.3–10.6)
CHLORIDE SERPL-SCNC: 100 MMOL/L (ref 99–110)
CHLORIDE SERPL-SCNC: 101 MMOL/L (ref 99–110)
CLARITY UR: CLEAR
CO2 BLDV-SCNC: 61 MMOL/L
CO2 SERPL-SCNC: 33 MMOL/L (ref 21–32)
CO2 SERPL-SCNC: 33 MMOL/L (ref 21–32)
COHGB MFR BLDV: 2.7 % (ref 0–1.5)
COLOR UR: YELLOW
CREAT SERPL-MCNC: 1.7 MG/DL (ref 0.6–1.2)
CREAT SERPL-MCNC: 2 MG/DL (ref 0.6–1.2)
DEPRECATED RDW RBC AUTO: 14.9 % (ref 12.4–15.4)
DEPRECATED RDW RBC AUTO: 15.1 % (ref 12.4–15.4)
ECHO AO ASC DIAM: 3.4 CM
ECHO AO ASCENDING AORTA INDEX: 1.88 CM/M2
ECHO AO ROOT DIAM: 3 CM
ECHO AO ROOT INDEX: 1.66 CM/M2
ECHO AV AREA PEAK VELOCITY: 2.3 CM2
ECHO AV AREA VTI: 2 CM2
ECHO AV AREA/BSA PEAK VELOCITY: 1.3 CM2/M2
ECHO AV AREA/BSA VTI: 1.1 CM2/M2
ECHO AV CUSP MM: 1.7 CM
ECHO AV MEAN GRADIENT: 8 MMHG
ECHO AV MEAN VELOCITY: 1.4 M/S
ECHO AV PEAK GRADIENT: 12 MMHG
ECHO AV PEAK VELOCITY: 1.7 M/S
ECHO AV VELOCITY RATIO: 0.88
ECHO AV VTI: 46 CM
ECHO BSA: 1.87 M2
ECHO EST RA PRESSURE: 3 MMHG
ECHO IVC PROX: 1.4 CM
ECHO LA AREA 2C: 11.7 CM2
ECHO LA AREA 4C: 13.9 CM2
ECHO LA DIAMETER INDEX: 1.93 CM/M2
ECHO LA DIAMETER: 3.5 CM
ECHO LA MAJOR AXIS: 4.5 CM
ECHO LA MINOR AXIS: 4.3 CM
ECHO LA TO AORTIC ROOT RATIO: 1.17
ECHO LA VOL BP: 30 ML (ref 22–52)
ECHO LA VOL MOD A2C: 26 ML (ref 22–52)
ECHO LA VOL MOD A4C: 35 ML (ref 22–52)
ECHO LA VOL/BSA BIPLANE: 17 ML/M2 (ref 16–34)
ECHO LA VOLUME INDEX MOD A2C: 14 ML/M2 (ref 16–34)
ECHO LA VOLUME INDEX MOD A4C: 19 ML/M2 (ref 16–34)
ECHO LV E' LATERAL VELOCITY: 7.51 CM/S
ECHO LV E' SEPTAL VELOCITY: 6.2 CM/S
ECHO LV EDV A2C: 56 ML
ECHO LV EDV A4C: 86 ML
ECHO LV EDV INDEX A4C: 48 ML/M2
ECHO LV EDV NDEX A2C: 31 ML/M2
ECHO LV EF PHYSICIAN: 70 %
ECHO LV EJECTION FRACTION A2C: 69 %
ECHO LV EJECTION FRACTION A4C: 76 %
ECHO LV EJECTION FRACTION BIPLANE: 73 % (ref 55–100)
ECHO LV ESV A2C: 18 ML
ECHO LV ESV A4C: 21 ML
ECHO LV ESV INDEX A2C: 10 ML/M2
ECHO LV ESV INDEX A4C: 12 ML/M2
ECHO LV FRACTIONAL SHORTENING: 29 % (ref 28–44)
ECHO LV INTERNAL DIMENSION DIASTOLE INDEX: 2.65 CM/M2
ECHO LV INTERNAL DIMENSION DIASTOLIC: 4.8 CM (ref 3.9–5.3)
ECHO LV INTERNAL DIMENSION SYSTOLIC INDEX: 1.88 CM/M2
ECHO LV INTERNAL DIMENSION SYSTOLIC: 3.4 CM
ECHO LV IVSD: 0.9 CM (ref 0.6–0.9)
ECHO LV MASS 2D: 147.8 G (ref 67–162)
ECHO LV MASS INDEX 2D: 81.6 G/M2 (ref 43–95)
ECHO LV POSTERIOR WALL DIASTOLIC: 0.9 CM (ref 0.6–0.9)
ECHO LV RELATIVE WALL THICKNESS RATIO: 0.38
ECHO LVOT AREA: 2.5 CM2
ECHO LVOT AV VTI INDEX: 0.79
ECHO LVOT DIAM: 1.8 CM
ECHO LVOT MEAN GRADIENT: 6 MMHG
ECHO LVOT PEAK GRADIENT: 9 MMHG
ECHO LVOT PEAK VELOCITY: 1.5 M/S
ECHO LVOT STROKE VOLUME INDEX: 51.1 ML/M2
ECHO LVOT SV: 92.6 ML
ECHO LVOT VTI: 36.4 CM
ECHO MV A VELOCITY: 0.78 M/S
ECHO MV AREA VTI: 2.4 CM2
ECHO MV E DECELERATION TIME (DT): 290 MS
ECHO MV E VELOCITY: 0.87 M/S
ECHO MV E/A RATIO: 1.12
ECHO MV E/E' LATERAL: 11.58
ECHO MV E/E' RATIO (AVERAGED): 12.81
ECHO MV E/E' SEPTAL: 14.03
ECHO MV LVOT VTI INDEX: 1.07
ECHO MV MAX VELOCITY: 1.1 M/S
ECHO MV MEAN GRADIENT: 2 MMHG
ECHO MV MEAN VELOCITY: 0.7 M/S
ECHO MV PEAK GRADIENT: 5 MMHG
ECHO MV REGURGITANT PEAK GRADIENT: 88 MMHG
ECHO MV REGURGITANT PEAK VELOCITY: 4.7 M/S
ECHO MV VTI: 39 CM
ECHO PV MAX VELOCITY: 1.1 M/S
ECHO PV MEAN GRADIENT: 3 MMHG
ECHO PV MEAN VELOCITY: 0.9 M/S
ECHO PV PEAK GRADIENT: 5 MMHG
ECHO PV VTI: 30.6 CM
ECHO RA AREA 4C: 13.6 CM2
ECHO RA END SYSTOLIC VOLUME APICAL 4 CHAMBER INDEX BSA: 16 ML/M2
ECHO RA VOLUME: 29 ML
ECHO RIGHT VENTRICULAR SYSTOLIC PRESSURE (RVSP): 37 MMHG
ECHO RV BASAL DIMENSION: 3 CM
ECHO RV FREE WALL PEAK S': 13.2 CM/S
ECHO RV LONGITUDINAL DIMENSION: 6.9 CM
ECHO RV MID DIMENSION: 2.4 CM
ECHO RV TAPSE: 1.6 CM (ref 1.7–?)
ECHO TV REGURGITANT MAX VELOCITY: 2.91 M/S
ECHO TV REGURGITANT PEAK GRADIENT: 26 MMHG
ECHO TV REGURGITANT VTI: 97.3 CM
EKG ATRIAL RATE: 78 BPM
EKG DIAGNOSIS: NORMAL
EKG P AXIS: 55 DEGREES
EKG P-R INTERVAL: 210 MS
EKG Q-T INTERVAL: 388 MS
EKG QRS DURATION: 76 MS
EKG QTC CALCULATION (BAZETT): 442 MS
EKG R AXIS: 7 DEGREES
EKG T AXIS: 42 DEGREES
EKG VENTRICULAR RATE: 78 BPM
EOSINOPHIL # BLD: 0.1 K/UL (ref 0–0.6)
EOSINOPHIL # BLD: 0.2 K/UL (ref 0–0.6)
EOSINOPHIL NFR BLD: 1.2 %
EOSINOPHIL NFR BLD: 2.2 %
FLUAV RNA RESP QL NAA+PROBE: NOT DETECTED
FLUBV RNA RESP QL NAA+PROBE: NOT DETECTED
GFR SERPLBLD CREATININE-BSD FMLA CKD-EPI: 23 ML/MIN/{1.73_M2}
GFR SERPLBLD CREATININE-BSD FMLA CKD-EPI: 28 ML/MIN/{1.73_M2}
GLUCOSE SERPL-MCNC: 119 MG/DL (ref 70–99)
GLUCOSE SERPL-MCNC: 123 MG/DL (ref 70–99)
GLUCOSE UR STRIP.AUTO-MCNC: NEGATIVE MG/DL
HCO3 BLDV-SCNC: 26.1 MMOL/L (ref 23–29)
HCT VFR BLD AUTO: 29.9 % (ref 36–48)
HCT VFR BLD AUTO: 31.2 % (ref 36–48)
HGB BLD-MCNC: 10.5 G/DL (ref 12–16)
HGB BLD-MCNC: 9.9 G/DL (ref 12–16)
HGB UR QL STRIP.AUTO: NEGATIVE
KETONES UR STRIP.AUTO-MCNC: NEGATIVE MG/DL
LACTATE BLDV-SCNC: 0.9 MMOL/L (ref 0.4–1.9)
LEUKOCYTE ESTERASE UR QL STRIP.AUTO: NEGATIVE
LYMPHOCYTES # BLD: 1 K/UL (ref 1–5.1)
LYMPHOCYTES # BLD: 1.7 K/UL (ref 1–5.1)
LYMPHOCYTES NFR BLD: 15.7 %
LYMPHOCYTES NFR BLD: 25 %
MAGNESIUM SERPL-MCNC: 1.8 MG/DL (ref 1.8–2.4)
MCH RBC QN AUTO: 29.5 PG (ref 26–34)
MCH RBC QN AUTO: 29.8 PG (ref 26–34)
MCHC RBC AUTO-ENTMCNC: 33 G/DL (ref 31–36)
MCHC RBC AUTO-ENTMCNC: 33.6 G/DL (ref 31–36)
MCV RBC AUTO: 88.8 FL (ref 80–100)
MCV RBC AUTO: 89.4 FL (ref 80–100)
METHGB MFR BLDV: 0.7 %
MONOCYTES # BLD: 0.5 K/UL (ref 0–1.3)
MONOCYTES # BLD: 0.8 K/UL (ref 0–1.3)
MONOCYTES NFR BLD: 11.2 %
MONOCYTES NFR BLD: 8.3 %
NEUTROPHILS # BLD: 4.3 K/UL (ref 1.7–7.7)
NEUTROPHILS # BLD: 4.6 K/UL (ref 1.7–7.7)
NEUTROPHILS NFR BLD: 61.1 %
NEUTROPHILS NFR BLD: 74.1 %
NITRITE UR QL STRIP.AUTO: NEGATIVE
NT-PROBNP SERPL-MCNC: 2376 PG/ML (ref 0–449)
O2 CT VFR BLDV CALC: 10 VOL %
O2 THERAPY: ABNORMAL
PCO2 BLDV: 39 MMHG (ref 40–50)
PH BLDV: 7.43 [PH] (ref 7.35–7.45)
PH UR STRIP.AUTO: 5 [PH] (ref 5–8)
PLATELET # BLD AUTO: 205 K/UL (ref 135–450)
PLATELET # BLD AUTO: 222 K/UL (ref 135–450)
PMV BLD AUTO: 7.8 FL (ref 5–10.5)
PMV BLD AUTO: 8.3 FL (ref 5–10.5)
PO2 BLDV: 101 MMHG (ref 25–40)
POTASSIUM SERPL-SCNC: 3.8 MMOL/L (ref 3.5–5.1)
POTASSIUM SERPL-SCNC: 3.9 MMOL/L (ref 3.5–5.1)
PROCALCITONIN SERPL IA-MCNC: 0.3 NG/ML (ref 0–0.15)
PROT SERPL-MCNC: 6.3 G/DL (ref 6.4–8.2)
PROT UR STRIP.AUTO-MCNC: NEGATIVE MG/DL
RBC # BLD AUTO: 3.35 M/UL (ref 4–5.2)
RBC # BLD AUTO: 3.51 M/UL (ref 4–5.2)
SAO2 % BLDV: 99 %
SARS-COV-2 RNA RESP QL NAA+PROBE: NOT DETECTED
SODIUM SERPL-SCNC: 143 MMOL/L (ref 136–145)
SODIUM SERPL-SCNC: 144 MMOL/L (ref 136–145)
SP GR UR STRIP.AUTO: 1.01 (ref 1–1.03)
TROPONIN, HIGH SENSITIVITY: 45 NG/L (ref 0–14)
TROPONIN, HIGH SENSITIVITY: 52 NG/L (ref 0–14)
TROPONIN, HIGH SENSITIVITY: 57 NG/L (ref 0–14)
TROPONIN, HIGH SENSITIVITY: 58 NG/L (ref 0–14)
UA COMPLETE W REFLEX CULTURE PNL UR: NORMAL
UA DIPSTICK W REFLEX MICRO PNL UR: NORMAL
URN SPEC COLLECT METH UR: NORMAL
UROBILINOGEN UR STRIP-ACNC: 1 E.U./DL
WBC # BLD AUTO: 6.2 K/UL (ref 4–11)
WBC # BLD AUTO: 7 K/UL (ref 4–11)

## 2025-02-21 PROCEDURE — 87205 SMEAR GRAM STAIN: CPT

## 2025-02-21 PROCEDURE — 82803 BLOOD GASES ANY COMBINATION: CPT

## 2025-02-21 PROCEDURE — 93306 TTE W/DOPPLER COMPLETE: CPT

## 2025-02-21 PROCEDURE — 36556 INSERT NON-TUNNEL CV CATH: CPT

## 2025-02-21 PROCEDURE — 96374 THER/PROPH/DIAG INJ IV PUSH: CPT

## 2025-02-21 PROCEDURE — 93010 ELECTROCARDIOGRAM REPORT: CPT | Performed by: INTERNAL MEDICINE

## 2025-02-21 PROCEDURE — 84145 PROCALCITONIN (PCT): CPT

## 2025-02-21 PROCEDURE — 93306 TTE W/DOPPLER COMPLETE: CPT | Performed by: INTERNAL MEDICINE

## 2025-02-21 PROCEDURE — 87186 SC STD MICRODIL/AGAR DIL: CPT

## 2025-02-21 PROCEDURE — 2500000003 HC RX 250 WO HCPCS: Performed by: STUDENT IN AN ORGANIZED HEALTH CARE EDUCATION/TRAINING PROGRAM

## 2025-02-21 PROCEDURE — 99285 EMERGENCY DEPT VISIT HI MDM: CPT

## 2025-02-21 PROCEDURE — 81003 URINALYSIS AUTO W/O SCOPE: CPT

## 2025-02-21 PROCEDURE — 87040 BLOOD CULTURE FOR BACTERIA: CPT

## 2025-02-21 PROCEDURE — 83605 ASSAY OF LACTIC ACID: CPT

## 2025-02-21 PROCEDURE — 99223 1ST HOSP IP/OBS HIGH 75: CPT | Performed by: INTERNAL MEDICINE

## 2025-02-21 PROCEDURE — 3E033XZ INTRODUCTION OF VASOPRESSOR INTO PERIPHERAL VEIN, PERCUTANEOUS APPROACH: ICD-10-PCS | Performed by: STUDENT IN AN ORGANIZED HEALTH CARE EDUCATION/TRAINING PROGRAM

## 2025-02-21 PROCEDURE — 80061 LIPID PANEL: CPT

## 2025-02-21 PROCEDURE — 83735 ASSAY OF MAGNESIUM: CPT

## 2025-02-21 PROCEDURE — 82550 ASSAY OF CK (CPK): CPT

## 2025-02-21 PROCEDURE — 6370000000 HC RX 637 (ALT 250 FOR IP): Performed by: STUDENT IN AN ORGANIZED HEALTH CARE EDUCATION/TRAINING PROGRAM

## 2025-02-21 PROCEDURE — 84484 ASSAY OF TROPONIN QUANT: CPT

## 2025-02-21 PROCEDURE — 70450 CT HEAD/BRAIN W/O DYE: CPT

## 2025-02-21 PROCEDURE — 84443 ASSAY THYROID STIM HORMONE: CPT

## 2025-02-21 PROCEDURE — 06HY33Z INSERTION OF INFUSION DEVICE INTO LOWER VEIN, PERCUTANEOUS APPROACH: ICD-10-PCS | Performed by: STUDENT IN AN ORGANIZED HEALTH CARE EDUCATION/TRAINING PROGRAM

## 2025-02-21 PROCEDURE — 83880 ASSAY OF NATRIURETIC PEPTIDE: CPT

## 2025-02-21 PROCEDURE — 80053 COMPREHEN METABOLIC PANEL: CPT

## 2025-02-21 PROCEDURE — 6360000002 HC RX W HCPCS: Performed by: STUDENT IN AN ORGANIZED HEALTH CARE EDUCATION/TRAINING PROGRAM

## 2025-02-21 PROCEDURE — 36592 COLLECT BLOOD FROM PICC: CPT

## 2025-02-21 PROCEDURE — 93005 ELECTROCARDIOGRAM TRACING: CPT | Performed by: STUDENT IN AN ORGANIZED HEALTH CARE EDUCATION/TRAINING PROGRAM

## 2025-02-21 PROCEDURE — 6370000000 HC RX 637 (ALT 250 FOR IP): Performed by: INTERNAL MEDICINE

## 2025-02-21 PROCEDURE — 87636 SARSCOV2 & INF A&B AMP PRB: CPT

## 2025-02-21 PROCEDURE — 36415 COLL VENOUS BLD VENIPUNCTURE: CPT

## 2025-02-21 PROCEDURE — 85025 COMPLETE CBC W/AUTO DIFF WBC: CPT

## 2025-02-21 PROCEDURE — 87077 CULTURE AEROBIC IDENTIFY: CPT

## 2025-02-21 PROCEDURE — 2580000003 HC RX 258: Performed by: STUDENT IN AN ORGANIZED HEALTH CARE EDUCATION/TRAINING PROGRAM

## 2025-02-21 PROCEDURE — 2000000000 HC ICU R&B

## 2025-02-21 PROCEDURE — 83036 HEMOGLOBIN GLYCOSYLATED A1C: CPT

## 2025-02-21 PROCEDURE — 92610 EVALUATE SWALLOWING FUNCTION: CPT

## 2025-02-21 PROCEDURE — 71045 X-RAY EXAM CHEST 1 VIEW: CPT

## 2025-02-21 PROCEDURE — 87070 CULTURE OTHR SPECIMN AEROBIC: CPT

## 2025-02-21 PROCEDURE — 74176 CT ABD & PELVIS W/O CONTRAST: CPT

## 2025-02-21 RX ORDER — ATORVASTATIN CALCIUM 40 MG/1
40 TABLET, FILM COATED ORAL NIGHTLY
Status: DISCONTINUED | OUTPATIENT
Start: 2025-02-21 | End: 2025-02-25 | Stop reason: HOSPADM

## 2025-02-21 RX ORDER — ENOXAPARIN SODIUM 100 MG/ML
30 INJECTION SUBCUTANEOUS DAILY
Status: DISCONTINUED | OUTPATIENT
Start: 2025-02-21 | End: 2025-02-25 | Stop reason: HOSPADM

## 2025-02-21 RX ORDER — TORSEMIDE 20 MG/1
60 TABLET ORAL DAILY
COMMUNITY

## 2025-02-21 RX ORDER — BISACODYL 10 MG
10 SUPPOSITORY, RECTAL RECTAL DAILY PRN
COMMUNITY

## 2025-02-21 RX ORDER — ASPIRIN 81 MG/1
81 TABLET ORAL 2 TIMES DAILY
Status: DISCONTINUED | OUTPATIENT
Start: 2025-02-21 | End: 2025-02-21

## 2025-02-21 RX ORDER — TRAMADOL HYDROCHLORIDE 50 MG/1
50 TABLET ORAL 2 TIMES DAILY
COMMUNITY

## 2025-02-21 RX ORDER — ONDANSETRON 4 MG/1
4 TABLET, ORALLY DISINTEGRATING ORAL EVERY 8 HOURS PRN
Status: DISCONTINUED | OUTPATIENT
Start: 2025-02-21 | End: 2025-02-25 | Stop reason: HOSPADM

## 2025-02-21 RX ORDER — HYDROXYZINE PAMOATE 25 MG/1
25 CAPSULE ORAL 3 TIMES DAILY PRN
COMMUNITY

## 2025-02-21 RX ORDER — NOREPINEPHRINE BITARTRATE 0.06 MG/ML
1-100 INJECTION, SOLUTION INTRAVENOUS CONTINUOUS
Status: DISCONTINUED | OUTPATIENT
Start: 2025-02-21 | End: 2025-02-22

## 2025-02-21 RX ORDER — SODIUM CHLORIDE 9 MG/ML
INJECTION, SOLUTION INTRAVENOUS CONTINUOUS
Status: ACTIVE | OUTPATIENT
Start: 2025-02-21 | End: 2025-02-22

## 2025-02-21 RX ORDER — SODIUM CHLORIDE 0.9 % (FLUSH) 0.9 %
5-40 SYRINGE (ML) INJECTION EVERY 12 HOURS SCHEDULED
Status: DISCONTINUED | OUTPATIENT
Start: 2025-02-21 | End: 2025-02-25 | Stop reason: HOSPADM

## 2025-02-21 RX ORDER — SODIUM CHLORIDE 0.9 % (FLUSH) 0.9 %
5-40 SYRINGE (ML) INJECTION PRN
Status: DISCONTINUED | OUTPATIENT
Start: 2025-02-21 | End: 2025-02-25 | Stop reason: HOSPADM

## 2025-02-21 RX ORDER — POLYETHYLENE GLYCOL 3350 17 G/17G
17 POWDER, FOR SOLUTION ORAL DAILY PRN
Status: DISCONTINUED | OUTPATIENT
Start: 2025-02-21 | End: 2025-02-25 | Stop reason: HOSPADM

## 2025-02-21 RX ORDER — ONDANSETRON 2 MG/ML
4 INJECTION INTRAMUSCULAR; INTRAVENOUS EVERY 6 HOURS PRN
Status: DISCONTINUED | OUTPATIENT
Start: 2025-02-21 | End: 2025-02-25 | Stop reason: HOSPADM

## 2025-02-21 RX ORDER — BUSPIRONE HYDROCHLORIDE 5 MG/1
10 TABLET ORAL 3 TIMES DAILY
Status: DISCONTINUED | OUTPATIENT
Start: 2025-02-21 | End: 2025-02-25 | Stop reason: HOSPADM

## 2025-02-21 RX ORDER — ACETAMINOPHEN 650 MG/1
650 SUPPOSITORY RECTAL EVERY 6 HOURS PRN
Status: DISCONTINUED | OUTPATIENT
Start: 2025-02-21 | End: 2025-02-25 | Stop reason: HOSPADM

## 2025-02-21 RX ORDER — SODIUM CHLORIDE 9 MG/ML
INJECTION, SOLUTION INTRAVENOUS CONTINUOUS
Status: DISCONTINUED | OUTPATIENT
Start: 2025-02-21 | End: 2025-02-21

## 2025-02-21 RX ORDER — NOREPINEPHRINE BITARTRATE 0.06 MG/ML
1-100 INJECTION, SOLUTION INTRAVENOUS CONTINUOUS
Status: DISCONTINUED | OUTPATIENT
Start: 2025-02-21 | End: 2025-02-21

## 2025-02-21 RX ORDER — POLYETHYLENE GLYCOL 3350 17 G/17G
17 POWDER, FOR SOLUTION ORAL DAILY PRN
COMMUNITY

## 2025-02-21 RX ORDER — ACETAMINOPHEN 325 MG/1
650 TABLET ORAL EVERY 6 HOURS PRN
Status: DISCONTINUED | OUTPATIENT
Start: 2025-02-21 | End: 2025-02-25 | Stop reason: HOSPADM

## 2025-02-21 RX ORDER — ASPIRIN 81 MG/1
81 TABLET, CHEWABLE ORAL DAILY
Status: DISCONTINUED | OUTPATIENT
Start: 2025-02-21 | End: 2025-02-25 | Stop reason: HOSPADM

## 2025-02-21 RX ORDER — SODIUM CHLORIDE 9 MG/ML
INJECTION, SOLUTION INTRAVENOUS PRN
Status: DISCONTINUED | OUTPATIENT
Start: 2025-02-21 | End: 2025-02-25 | Stop reason: HOSPADM

## 2025-02-21 RX ORDER — FUROSEMIDE 10 MG/ML
40 INJECTION INTRAMUSCULAR; INTRAVENOUS 2 TIMES DAILY
Status: DISCONTINUED | OUTPATIENT
Start: 2025-02-21 | End: 2025-02-21

## 2025-02-21 RX ORDER — METOPROLOL SUCCINATE 25 MG/1
25 TABLET, EXTENDED RELEASE ORAL DAILY
COMMUNITY

## 2025-02-21 RX ORDER — SENNA AND DOCUSATE SODIUM 50; 8.6 MG/1; MG/1
1 TABLET, FILM COATED ORAL DAILY
COMMUNITY

## 2025-02-21 RX ORDER — MIDODRINE HYDROCHLORIDE 5 MG/1
5 TABLET ORAL
Status: DISCONTINUED | OUTPATIENT
Start: 2025-02-21 | End: 2025-02-22

## 2025-02-21 RX ADMIN — SODIUM CHLORIDE, PRESERVATIVE FREE 10 ML: 5 INJECTION INTRAVENOUS at 21:32

## 2025-02-21 RX ADMIN — BUSPIRONE HYDROCHLORIDE 10 MG: 5 TABLET ORAL at 09:16

## 2025-02-21 RX ADMIN — BUSPIRONE HYDROCHLORIDE 10 MG: 5 TABLET ORAL at 21:28

## 2025-02-21 RX ADMIN — BUSPIRONE HYDROCHLORIDE 10 MG: 5 TABLET ORAL at 14:40

## 2025-02-21 RX ADMIN — FUROSEMIDE 40 MG: 10 INJECTION, SOLUTION INTRAMUSCULAR; INTRAVENOUS at 09:16

## 2025-02-21 RX ADMIN — Medication 5 MCG/MIN: at 04:42

## 2025-02-21 RX ADMIN — ASPIRIN 81 MG: 81 TABLET, CHEWABLE ORAL at 09:15

## 2025-02-21 RX ADMIN — ATORVASTATIN CALCIUM 40 MG: 40 TABLET, FILM COATED ORAL at 21:28

## 2025-02-21 RX ADMIN — MELATONIN TAB 3 MG 3 MG: 3 TAB at 21:28

## 2025-02-21 RX ADMIN — MIDODRINE HYDROCHLORIDE 5 MG: 5 TABLET ORAL at 15:17

## 2025-02-21 RX ADMIN — SODIUM CHLORIDE, PRESERVATIVE FREE 10 ML: 5 INJECTION INTRAVENOUS at 09:16

## 2025-02-21 RX ADMIN — ENOXAPARIN SODIUM 30 MG: 100 INJECTION SUBCUTANEOUS at 09:16

## 2025-02-21 RX ADMIN — ACETAMINOPHEN 650 MG: 325 TABLET ORAL at 21:28

## 2025-02-21 RX ADMIN — SODIUM CHLORIDE: 9 INJECTION, SOLUTION INTRAVENOUS at 13:13

## 2025-02-21 ASSESSMENT — PAIN SCALES - PAIN ASSESSMENT IN ADVANCED DEMENTIA (PAINAD)
CONSOLABILITY: NO NEED TO CONSOLE
FACIALEXPRESSION: SMILING OR INEXPRESSIVE
BODYLANGUAGE: RELAXED
BREATHING: NORMAL
TOTALSCORE: 0

## 2025-02-21 ASSESSMENT — PAIN SCALES - GENERAL
PAINLEVEL_OUTOF10: 0
PAINLEVEL_OUTOF10: 2
PAINLEVEL_OUTOF10: 0
PAINLEVEL_OUTOF10: 2
PAINLEVEL_OUTOF10: 0

## 2025-02-21 NOTE — CONSULTS
[facility] [place]   CONSULTATION  [phone]      Chief Complaint   Patient presents with    Shortness of Breath     Pt via [facility] from [facility] w/ cc of shortness of breath and increased oxygen requirement per facility. Pt on 2L at baseline but has been requiring 4L. Hx of dementia but more altered than normal per facility.             History of Present Illness:  Jess Brand is a 89 y.o. patient who presented to the hospital with complaints of altered mental status. I have been asked to provide consultation regarding further management and testing. The history was obtained from the patient and her daughter. They report that she has been wheelchair bound since her fall/fracture of her hip. She has dementia. She is unable to provided any meaningful history. She came to the hospital for altered mental status, worsening renal function and worsening hypoxia. Cardiology was consulted for elevated troponin in the setting of celia, hypoxia and hypotension. The patient denies any chest pain or pressure.       Past Medical History:   has a past medical history of Allergic rhinitis, Anxiety, generalized, Arthritis, Chronic dermatitis of hands, Dementia (HCC), Depression, Full dentures, Hypertension, essential, benign, Impetigo, Intracervical pessary, Lumbar stenosis, Plantar fasciitis, Right-sided low back pain without sciatica, Thrush, Tobacco use disorder, and Uterus prolapse.    Surgical History:   has a past surgical history that includes Bunionectomy (Left, 03/11/2019); Bunionectomy (Left, 3/11/2019); and Femur fracture surgery (Left, 9/27/2023).     Social History:   reports that she quit smoking about 7 years ago. Her smoking use included cigarettes. She started smoking about 63 years ago. She has a 110 pack-year smoking history. She has never used smokeless tobacco. She reports that she does not drink alcohol and does not use drugs.         Home Medications:  Were reviewed and are listed in  Moderate episode of recurrent major depressive disorder (HCC)    Neutropenia    Multifactorial dementia (HCC)    Syncope    Hyponatremia    Crossover toe deformity of left foot    Bunion, left foot    Syncope and collapse    Fracture of neck of left humerus, closed, initial encounter    Closed displaced intertrochanteric fracture of left femur (HCC)    Shock (HCC)         Plan:    I had the opportunity to review the clinical symptoms and presentation of Jess Brand.     Assessment/Plan:  Principal Problem:  Elevated troponin  - flat trend, not consistent with acs. Secondary to zac, anemia and hypotension  - stable  Plan  - echocardiogram stable     2. Hypoxia  - new problem  Plan  - evaluation for pulmonary embolism and infection per primary team    3. Anemia  New problem  Plan  - follow up with primary team    4. ZAC on CKD  Worsening  Plan  - nephrology consulted     5. PVCS  - stable  Plan  - restart metoprolol when improved     6. Severe Dementia     7. Compression fracture    Cardiology will sign off. Thank you for this consult    I will address the patient's cardiac risk factors and adjusted pharmacologic treatment as needed. In addition, I have reinforced the need for patient directed risk factor modification.    Tobacco use was discussed with the patient and educated on the negative effects. I have asked the patient to not utilize these agents.    Thank you for allowing to us to participate in the care or Jess Brand. Further evaluation will be based upon the patient's clinical course and testing results.    All questions and concerns were addressed to the patient/family. Alternatives to my treatment were discussed. The note was completed using EMR. Every effort was made to ensure accuracy; however, inadvertent computerized transcription errors may be present.       All questions and concerns were addressed to the patient/family. Alternatives to my treatment were discussed. The note was

## 2025-02-21 NOTE — PROGRESS NOTES
Patient admitted this morning by my partner.  Per [facility] (the patient's nursing facility) the patient was sent to the emergency department due to complaints of shortness of breath and increased oxygen requirement.  Patient is known to wear 2 L at baseline but was requiring 4.  The patient does have significant dementia but per the facility has been more altered than usual.    The patient was admitted with a diagnosis of shock and is on low-dose Levophed at 2 mcgs  There is no clear etiology for the patient's low blood pressure.  Infectious workup so far has been negative and the patient's procalcitonin was 0.3 which is a very low probability of having any infection.    CT scan of the chest abdomen and pelvis was completed    1. Mild dependent subsegmental atelectasis in the lung bases. Trace pleural  effusions.  2. Moderate anterior wedging of T12 is a new finding since 2021 imaging.  3. Cholelithiasis.    Patient continues to have low blood pressure I am starting oral midodrine  She does have significant dementia and all of this may be just progression of that disease.    Cultures remain pending however with no procalcitonin elevation unlikely to be infected the preliminary results of her wound culture do not show any organisms.    Continue to monitor patient in the ICU due to pressor support

## 2025-02-21 NOTE — CARE COORDINATION
Case Management Assessment  Initial Evaluation    Date/Time of Evaluation: 2/21/2025 10:59 AM  Assessment Completed by: Sofia Aguilar    If patient is discharged prior to next notation, then this note serves as note for discharge by case management.    Patient Name: Jess Brand                   YOB: 1935  Diagnosis: Pulmonary congestion [R09.89]  Shock (HCC) [R57.9]  Gallstones [K80.20]  Hypoxia [R09.02]  ZAC (acute kidney injury) [N17.9]  Goals of care, counseling/discussion [Z71.89]  Hypotension, unspecified hypotension type [I95.9]                   Date / Time: 2/21/2025  1:04 AM ROOM: ICU-[address] [place] [address]    Patient Admission Status: Inpatient   Readmission Risk (Low < 19, Mod (19-27), High > 27): Readmission Risk Score: 14.4    Current PCP: No primary care provider on file.  PCP verified by CM? No    Chart Reviewed: Yes      History Provided by: Medical Record  Patient Orientation: Person    Patient Cognition: Alert    Hospitalization in the last 30 days (Readmission):  No    If yes, Readmission Assessment in CM Navigator will be completed.    Advance Directives:      Code Status: DNR-CCA   Patient's Primary Decision Maker is: Named in Scanned ACP Document    Primary Decision Maker: Shayy Fried - Child - [phone]    Primary Decision Maker: Ruma Giordano - Child - [phone]    Discharge Planning:    Patient expects to discharge to: Long-term care ([facility])  Plan for transportation at discharge:      Financial    [facility] / [facility]: [facility] / Product Type: *No Product type* /         Current Nursing Home Information:  Patient admitted from:  [facility]  [address] [place] [address]  Phone:[phone]  Fax:[phone]     Call to Ro/munir  who confirmed the patient is: Long Term Care, no Precert required for return.      Patient Covid vaccination status:    Internal Administration

## 2025-02-21 NOTE — PROGRESS NOTES
Speech Language Pathology  [facility]  [phone]  SLP Clinical Swallow Evaluation       Patient: Jess Brand   : 1935   MRN: [id number]      Evaluation Date: 2025      Admitting Dx: Pulmonary congestion [R09.89]  Shock (HCC) [R57.9]  Gallstones [K80.20]  Hypoxia [R09.02]  ZAC (acute kidney injury) [N17.9]  Goals of care, counseling/discussion [Z71.89]  Hypotension, unspecified hypotension type [I95.9]  Treatment Diagnosis: Oropharyngeal Dysphagia   Pain: Did not state                                  Recommendations      Recommended Diet and Intervention 2025:  Diet Solids Recommendation:  Dysphagia II Minced and Moist  Liquid Consistency Recommendation:  Thin liquids  Recommended form of Meds: Meds crushed as able in puree     ** 1:1 feed assist when pt is fully alert      Compensatory strategies: Upright as possible with all PO intake , Assist Feed , Small bites/sips , Eat/feed slowly, Remain upright 30-45 min , Aspiration Precautions     Discharge Recommendations:  Discharge recommendations to be determined pending ongoing follow-up during acute care stay    History/Course of Treatment     H&P: Jess Brand is a 89 y.o. female who presents to the ER with flu like symptoms.  She is from [facility] with shortness of breath and increased oxygen demand with fever.  Wears 2 liters of oxygen at baseline.  Hx of dementia, reported that she I more altered than baseline    Imaging:  Chest X-ray:  25  IMPRESSION:  1. Minimal bibasilar airspace opacities most likely due to atelectasis.  2. Pulmonary vascular congestion and mild cardiomegaly.  3. Possible trace bilateral pleural effusions.     Head CT:  25  IMPRESSION:  1. No acute intracranial abnormality.  2. Moderate to severe atrophy more prominent in the bilateral frontal and  temporal lobes.  3. Chronic microvascular ischemic changes.     History/Prior Level of Function:

## 2025-02-21 NOTE — PROGRESS NOTES
4 Eyes Skin Assessment     NAME:  Jess Brand  YOB: 1935  MEDICAL RECORD NUMBER:  [id number]    The patient is being assessed for  Admission    I agree that at least one RN has performed a thorough Head to Toe Skin Assessment on the patient. ALL assessment sites listed below have been assessed.      Areas assessed by both nurses:    Head, Face, Ears, Shoulders, Back, Chest, Arms, Elbows, Hands, Sacrum. Buttock, Coccyx, Ischium, Legs. Feet and Heels, and Under Medical Devices         Does the Patient have a Wound? Yes wound(s) were present on assessment. LDA wound assessment was Initiated and completed by RN       Cr Prevention initiated by RN: Yes  Wound Care Orders initiated by RN: Yes    Pressure Injury (Stage 3,4, Unstageable, DTI, NWPT, and Complex wounds) if present, place Wound referral order by RN under : Yes    New Ostomies, if present place, Ostomy referral order under : No     Nurse 1 eSignature: Electronically signed by ANA PILLAI RN on 2/21/25 at 6:51 AM EST    **SHARE this note so that the co-signing nurse can place an eSignature**    Nurse 2 eSignature: Electronically signed by Moisés Ahmadi RN on 2/21/25 at 6:53 AM EST

## 2025-02-21 NOTE — CARE COORDINATION
Wound care consulted for small wound to sacrum and dry flaky skin. Per nurse Mary Kate, patient scratches consently.  Wound care orders written. HOMERO WEISSN, RN, [facility]  [facility]  Wound/Ostomy Care  [phone]

## 2025-02-21 NOTE — CONSULTS
[facility] ([facility])   Nephrology Consult Note  [phone]  [email or web address]        Patient: Jess Brand    MRN: [id number]    : 1935     Reason for Consult:    Acute Kidney Injury (ZAC)     Requesting Provider: Dr. Newton    History of Present Illness / Subjective:    Jess Brand is a 89 y.o. female with a PMHx of dementia, anxiety disorder, HTN, GERD, CKD stage 3a, chronic respiratory failure on 2L NC at baseline.   She was sent from [facility] for increased oxygen requirements and shortness of breath, with increased lethargy. Required 4L NC oxygen on arrival.   BP initially 118/88 then dropped to 80's and was started on levophed. She did not receive IV fluids given concern for volume overload and started on IV lasix. Respiratory viral panel was negative. Lactic acid 0.9. UA bland. No peripheral leucocytosis. Trop positive with ProBNP 2,376.     Her labs were relevant for Cr of 1.7 up to 2 today, and we were consulted in the setting of ZAC.     CXR showed Minimal bibasilar airspace opacities most likely due to atelectasis. Pulmonary vascular congestion and mild cardiomegaly.    CT Chest, abdomen and pelvis without contrast showed Mild dependent subsegmental atelectasis in the lung bases. Trace pleural effusions.. Moderate anterior wedging of T12 is a new finding since  imaging. Cholelithiasis.    CTH was negative for acute abnormality.     TTE 25: EF 70-75%, Indeterminate diastolic function. Normal RV systolic function. Mild regurgitation      Chart reviewed.  Patient was seen and examined. Daughters at bedside.   Was on 2L NC, on Levophed coming down on dose.   Awake but lethargic .   Urine output not recorded.     Home meds but needs review : lasix , ? Torsemide , metoprolol        Past Medical History   Active Ambulatory Problems     Diagnosis Date Noted    Chronic dermatitis of hands     Plantar fasciitis     Lumbar stenosis     Anxiety,  polyethylene glycol (GLYCOLAX) packet 17 g  17 g Oral Daily PRN Moisés Vick MD        acetaminophen (TYLENOL) tablet 650 mg  650 mg Oral Q6H PRN Moisés Vick MD        Or    acetaminophen (TYLENOL) suppository 650 mg  650 mg Rectal Q6H PRN Moisés Vick MD        atorvastatin (LIPITOR) tablet 40 mg  40 mg Oral Nightly Moisés Vick MD        aspirin chewable tablet 81 mg  81 mg Oral Daily Moisés Vick MD   81 mg at 02/21/25 0915    sulfur hexafluoride microspheres (LUMASON) 60.7-25 MG injection 2 mL  2 mL IntraVENous ONCE PRN Moisés Vick MD           Review of Systems:  Unable to obtain, due to lethargy/dementia         Physical Exam:  Vitals:    02/21/25 1145   BP: (!) 112/57   Pulse: 57   Resp:    Temp:    SpO2:        Gen:  Resting in bed, not in acute distress, on 2L NC  Lungs:  equal bilaterally   Cardiovascular: RRR   Edema:  No edema, no cyanosis  Abd: soft, non tender, positive bowel sounds  Neuro: Awake, lethargic     Laboratory Assessments:   RFP:   Recent Labs     02/21/25  0138 02/21/25  0630    143   K 3.9 3.8    100   CO2 33* 33*   BUN 33* 34*   CREATININE 1.7* 2.0*   MG 1.80  --        Liver panel:  Recent Labs     02/21/25 0138   AST 22   ALT 16         CBC:   Recent Labs     02/21/25  0138 02/21/25  0630   WBC 6.2 7.0   HGB 10.5* 9.9*   HCT 31.2* 29.9*   MCV 88.8 89.4    222         Lab Results   Component Value Date/Time     02/21/2025 06:30 AM    K 3.8 02/21/2025 06:30 AM    K 3.9 02/21/2025 01:38 AM     02/21/2025 06:30 AM    CO2 33 (H) 02/21/2025 06:30 AM    BUN 34 (H) 02/21/2025 06:30 AM    CREATININE 2.0 (H) 02/21/2025 06:30 AM    CALCIUM 9.0 02/21/2025 06:30 AM    PHOS 2.6 10/04/2023 05:04 AM    MG 1.80 02/21/2025 01:38 AM     Lab Results   Component Value Date/Time    WBC 7.0 02/21/2025 06:30 AM    HGB 9.9 (L) 02/21/2025 06:30 AM    HCT 29.9 (L) 02/21/2025 06:30 AM     02/21/2025 06:30 AM     No results found for: \"PHART\", \"PCO2\", \"PO2ART\",

## 2025-02-21 NOTE — PROGRESS NOTES
Occupational and Physical Therapy  Jess Brand    OT and PT orders received.   Nurse stated to please hold this date due to on levophed at this time.     Will attempt again as schedule allows and when medically cleared.    Thank you,  Anna Ferrer, OTR/L [id number]  Thanks, Amanda Sanderson, PT, DPT [id number]

## 2025-02-21 NOTE — H&P
V2.0  History and Physical      Name:  Jess Brand /Age/Sex: 1935  (89 y.o. female)   MRN & CSN:  [id number] & [id number] Encounter Date/Time: 2025 6:01 AM EST   Location:  [facility] PCP: No primary care provider on file.       Hospital Day: 1    Assessment and Plan:   Jess Brand is a 89 y.o. female  who presents with Shock (HCC)    Hospital Problems             Last Modified POA    * (Principal) Shock (HCC) 2025 Yes         Assessment and Plan:  Shock unspecified etiology?  Hypervolemia.  Started on Levophed and Lasix admitted to the ICU.  Blood cultures have been ordered but no obvious source of infection has been identified.  Goals of care show CODE STATUS to be DNR CCA family is okay with pressors at the moment.  Lactate and procalcitonin MRI is not suggestive of sepsis/infection CT chest abdomen pelvis showed signs of gallstones but LFTs are not indicated above cholestatic pattern.  Small subtle bilateral pleural effusions.  Will check TSH cardiology and nephrology has been consulted PT OT on board.  Guarded prognosis repeat labs in the morning  Acute hypoxic respiratory failure in the setting of above  ZAC on CKD consider nephrology consultation  Uncertain age anterior T12 wedging when critical care Levophed requirement is done we will consider surgical consultation  Chronic GERD on PPI at home  Mood disorder on Zoloft and risperidone at home  Benign essential hypertension uses metoprolol  Elevated troponin likely demand ischemia currently trending down no active signs of chest pain warranting trending of troponin  History of pulmonary hypertension on echocardiogram 8 to 10 years ago.  Will repeat echocardiogram    Medical Decision Making:  The following items were considered in medical decision making:  Discussion of patient care with other providers  Reviewed clinical lab tests if any  Reviewed radiology tests if any  Reviewed other diagnostic  tests/interventions  Independent review of radiologic images if any  Microbiology cultures and other micro tests if any    Estimated time spent for medical decision-making encompassing complexity of the case, history taking, medication review, physical examination, communication with family, RN, , discussion with specialists, and ancillary staff members to provide accurate care for the patient was [id number] minutes.    MDM (any 2 required for High level billing)     A. Problems (any 1)  [x] Acute/Chronic Illness/injury posing ongoing threat to life and/or bodily function without ongoing treatment    [] Severe exacerbation of chronic illness    --------------------------------------------------  B. Risk of Treatment (any 1)    [x] Drugs/treatments that require intensive monitoring for toxicity    [] IV ABX (Vancomycin, Aminoglycosides, etc)     [] Post-Cath/Contrast study requiring serial monitoring    [] IV Narcotic analgesia    [x] Aggressive IV diuresis    [] Hypertonic Saline    [] Critical electrolyte abnormalities requiring IV replacement    [] Insulin - Scheduled/SSI or Insulin gtt    [] Anticoagulation (Heparin gtt or Coumadin - other anticoagulants in special circumstances)    [] Cardiac Medications (IV Amiodarone/Diltiazem, Tikosyn, etc)    [] Hemodialysis    [] Other -    [] Change in code status    [] Decision to escalate care    [] Major surgery/procedure with associated risk factors    --------------------------------------------------  C. Data (any 2)    [x] Data Review (any 3)    [x] Consultant notes from yesterday/today    [x] All available current labs reviewed interpreted for clinical significance    [x] Appropriate follow-up labs were ordered  [x] Collateral history obtained     [] Independent Interpretation of tests (any 1)    [] Telemetry (Rhythm Strip) personally reviewed and interpreted        [] Imaging personally reviewed and interpreted     [] Discussion (any 1)  []  or suspicious-appearing lymph nodes are identified. Lungs/pleura: Mild dependent subsegmental atelectasis in the lung bases. Trace pleural effusions.  The central airway is patent. Soft Tissues/Bones: Diffuse decreased bone mineralization.  Moderate anterior wedging of T12 is a new finding since 2021 imaging.  Advanced arthropathy in the glenohumeral joints. Abdomen/Pelvis: Organs: Evaluation of the abdominal and pelvic viscera is limited in the absence of contrast.  The liver, pancreas, spleen, kidneys, and adrenals reveal no acute findings.  Small layering gallstones.  No inflammatory change identified in the gallbladder fossa. No suspicious renal lesion identified. GI/Bowel: There is no bowel dilatation or wall thickening identified. Pelvis: No acute findings. Pessary in place. Peritoneum/Retroperitoneum: No free air or free fluid.  The aorta is normal in caliber.  No lymphadenopathy. Bones/Soft Tissues: No acute findings. Advanced multilevel disc disease and facet arthropathy.  Status post left femoral neck fracture repair. *Unless otherwise specified, incidental findings do not require dedicated imaging follow-up.     1. Mild dependent subsegmental atelectasis in the lung bases. Trace pleural effusions. 2. Moderate anterior wedging of T12 is a new finding since 2021 imaging. 3. Cholelithiasis.     CT Head W/O Contrast    Result Date: 2/21/2025  EXAMINATION: CT OF THE HEAD WITHOUT CONTRAST  2/21/2025 2:35 am TECHNIQUE: CT of the head was performed without the administration of intravenous contrast. Automated exposure control, iterative reconstruction, and/or weight based adjustment of the mA/kV was utilized to reduce the radiation dose to as low as reasonably achievable. COMPARISON: None. HISTORY: ORDERING SYSTEM PROVIDED HISTORY: AMS TECHNOLOGIST PROVIDED HISTORY: Reason for exam:->AMS Has a \"code stroke\" or \"stroke alert\" been called?->No Decision Support Exception - unselect if not a suspected or confirmed

## 2025-02-21 NOTE — CONSULTS
PALLIATIVE MEDICINE CONSULTATION     Patient name:Jess Brand   MRN:[id number]    :1935  Room/Bed:[facility]-3903/3903-01   LOS: 0 days         Date of consult:2025    Inpatient consult to Palliative Care  Consult performed by: Montse Torres APRN - CNP  Consult ordered by: Mt Newton MD  Reason for consult: [facility]              ASSESSMENT/RECOMMENDATIONS     89 y.o. female with AMS and shock with severe dementia at baseline       Symptom Management:  AMS- pt drowsy with minimal verbal responses   Shock-patient on pressor support for hypotension cultures pending has several open area is on the skin  Goals of Care-talk to patient's daughter Ruma at the bedside.  She says her sister Shayy is the healthcare POA she is gone home to take a shower and get some rest.  She reports that the goal of care is comfort for the patient the family is hopeful with 24 hours of support that she will improve.  We discussed that regardless of improvement with patient's severe dementia and open areas on skin infection could reoccur.  We discussed that if the goal is comfort hospice supportive discharge would be the best option.  Family is going to talk it over we discussed if in 24 hours she has not improved that they want to consider minimizing supportive measures. Pt is DNRCCA at present per family no intubation, CPR or shocks wanted.     Patient/Family Goals of Care :    talk to patient's daughter Ruma at the bedside.  She says her sister Shayy is the healthcare POA she is gone home to take a shower and get some rest.  She reports that the goal of care is comfort for the patient the family is hopeful with 24 hours of support that she will improve.  We discussed that regardless of improvement with patient's severe dementia and open areas on skin infection could reoccur.  We discussed that if the goal is comfort hospice supportive discharge would be the best option.  Family is going to talk it over

## 2025-02-21 NOTE — PROGRESS NOTES
Levophed stopped and restarted with an hour. Family at the bedside encouraging patient to eat lunch. Patient is sleeping.

## 2025-02-21 NOTE — PROGRESS NOTES
While awake MAP's are in the 80's, when sleeping they drop to mid 60's and unable to wean. Palliative care consulted to determine the family's goals of care. Levophed hanging at 1 mcg/min.

## 2025-02-21 NOTE — ED PROVIDER NOTES
I have personally performed a face to face diagnostic evaluation on this patient. I have fully participated in the care of this patient I personally saw the patient and performed a substantive portion of the visit including all aspects of the medical decision making.  I have reviewed and agree with all pertinent clinical information including history, physical exam, diagnostic tests, and the plan.    Medical Decision Making  88 yo female presents for altered mental status, new oxygen requirement from nursing facility.  Usually on 2 L nasal cannula at baseline but is requiring 4 L per EMS.  She has a history of dementia but has been more altered per nursing home.  Per chart she has history of advanced dementia, on risperidone.  History of normocytic anemia.  And CKD. Underwent ORIF of hip fracture in September 2023    On exam patient is ill-appearing.  Pale.  Cardiac regular rate and rhythm.  Lungs with crackles throughout.  4 L nasal cannula use.  No increased work of breathing.  Abdomen appears soft without any obvious tenderness to palpation.  There is symmetric pitting edema to bilateral lower extremities.  She awakens to verbal stimuli but does not cooperate with  history.  No obvious facial droop. She moves all 4 extremities spontaneously.    Echo from 2017:          Summary   Patient in irregular heart pattern.   Normal left ventricle size, wall thickness and systolic function with an   estimated ejection fraction of 55%. No regional wall motion abnormalities   are seen.   Mitral annular calcification is present.   Calcification of the mitral valve noted.   Mild mitral regurgitation is present.   There is mild tricuspid regurgitation with RVSP estimated at 39 mmHg.   This is suggestive of mild- pulmonary hypertension.             EKG  The Ekg interpreted by me in the absence of a cardiologist shows.  normal sinus rhythm with a rate of 78  Axis is   Normal  QTc is  normal  Intervals and Durations are

## 2025-02-21 NOTE — ED PROVIDER NOTES
[facility] EMERGENCY DEPARTMENT  EMERGENCY DEPARTMENT ENCOUNTER        Pt Name: Jess Brand  MRN: [id number]  Birthdate 9/5/1935  Date of evaluation: 2/21/2025  Provider: MESFIN Longo CNP  PCP: No primary care provider on file.  Note Started: 4:18 AM EST 2/21/25       I have seen and evaluated this patient with my supervising physician Maggy Lofton MD.      CHIEF COMPLAINT       Chief Complaint   Patient presents with    Shortness of Breath     Pt via [facility] from [facility] w/ cc of shortness of breath and increased oxygen requirement per facility. Pt on 2L at baseline but has been requiring 4L. Hx of dementia but more altered than normal per facility.        HISTORY OF PRESENT ILLNESS: 1 or more Elements     History From: ems and family  Limitations to history : Altered Mental Status    Jess Brand is a 89 y.o. female who presents to the ER with flu like symptoms.  She is from [facility] with shortness of breath and increased oxygen demand with fever.  Wears 2 liters of oxygen at baseline.  Hx of dementia, reported that she I more altered than baseline    Patient does not offer history.    Nursing Notes were all reviewed and agreed with or any disagreements were addressed in the HPI.    REVIEW OF SYSTEMS :      Review of Systems   Unable to perform ROS: Mental status change       Positives and Pertinent negatives as per HPI.     SURGICAL HISTORY     Past Surgical History:   Procedure Laterality Date    BUNIONECTOMY Left 03/11/2019    LEFT FOOT BUNION CORRECTION WITH SOFT TISSUE RELEASE MEDIAL EMINENCE AND SECOND TOE AMPUTATION WITH MINI C-ARM (Left Foot)    BUNIONECTOMY Left 3/11/2019    LEFT FOOT BUNION CORRECTION WITH SOFT TISSUE RELEASE MEDIAL EMINENCE AND SECOND TOE AMPUTATION performed by Alba Lopes MD at [facility] OR    FEMUR FRACTURE SURGERY Left 9/27/2023    OPEN REDUCTION INTERNAL FIXATION LEFT FEMUR INTRAMEDULLARY NAILING performed by Robson      Records Reviewed (External, Source and Summary) orthopedics dr. ij    CC/HPI Summary, DDx, ED Course, and Reassessment:     Briefly, this is a 89 year old female who presents to the ER with flu like symptoms.  She is [facility] with shortness of breath and increased oxygen demand.  Wears 2 liters of oxygen at baseline.  Hx of dementia, reported that she I more altered than baseline    Initial troponin 45 and repeat 52    UA unremarkable.  Cbc is unremarkable.  CMP shows celia.  Lactate is normal.  BNP is elevated.  VBG shows normal pH.  Covid and flu are negative      CT CHEST ABDOMEN PELVIS WO CONTRAST Additional Contrast? None (Final result)  Result time 02/21/25 04:17:57  Procedure changed from CT CHEST ABDOMEN PELVIS W CONTRAST Additional Contrast? None  Final result by Deandre Kim MD (02/21/25 04:17:57)                Impression:    1. Mild dependent subsegmental atelectasis in the lung bases. Trace pleural  effusions.  2. Moderate anterior wedging of T12 is a new finding since 2021 imaging.  3. Cholelithiasis.              CT Head W/O Contrast (Final result)  Result time 02/21/25 04:01:52  Final result by Callie Regalado MD (02/21/25 04:01:52)                Impression:    1. No acute intracranial abnormality.  2. Moderate to severe atrophy more prominent in the bilateral frontal and  temporal lobes.  3. Chronic microvascular ischemic changes.              XR CHEST PORTABLE (Final result)  Result time 02/21/25 02:20:53  Final result by Rubén Perez MD (02/21/25 02:20:53)                Impression:    1. Minimal bibasilar airspace opacities most likely due to atelectasis.  2. Pulmonary vascular congestion and mild cardiomegaly.  3. Possible trace bilateral pleural effusions.              Work up is pending, dispositioned by attending, please see her note.    Disposition Considerations (tests considered but not done, Admit vs D/C, Shared Decision Making, Pt Expectation of Test or Tx.):

## 2025-02-22 LAB
ANION GAP SERPL CALCULATED.3IONS-SCNC: 8 MMOL/L (ref 3–16)
BUN SERPL-MCNC: 30 MG/DL (ref 7–20)
CALCIUM SERPL-MCNC: 8.7 MG/DL (ref 8.3–10.6)
CHLORIDE SERPL-SCNC: 104 MMOL/L (ref 99–110)
CHOLEST SERPL-MCNC: 168 MG/DL (ref 0–199)
CK SERPL-CCNC: 25 U/L (ref 26–192)
CO2 SERPL-SCNC: 33 MMOL/L (ref 21–32)
CREAT SERPL-MCNC: 1.5 MG/DL (ref 0.6–1.2)
DEPRECATED RDW RBC AUTO: 15.2 % (ref 12.4–15.4)
GFR SERPLBLD CREATININE-BSD FMLA CKD-EPI: 33 ML/MIN/{1.73_M2}
GLUCOSE SERPL-MCNC: 97 MG/DL (ref 70–99)
HCT VFR BLD AUTO: 28.1 % (ref 36–48)
HDLC SERPL-MCNC: 30 MG/DL (ref 40–60)
HGB BLD-MCNC: 9.3 G/DL (ref 12–16)
LDLC SERPL CALC-MCNC: 112 MG/DL
MAGNESIUM SERPL-MCNC: 1.79 MG/DL (ref 1.8–2.4)
MCH RBC QN AUTO: 29.6 PG (ref 26–34)
MCHC RBC AUTO-ENTMCNC: 32.9 G/DL (ref 31–36)
MCV RBC AUTO: 90 FL (ref 80–100)
PHOSPHATE SERPL-MCNC: 3.9 MG/DL (ref 2.5–4.9)
PLATELET # BLD AUTO: 192 K/UL (ref 135–450)
PMV BLD AUTO: 7.8 FL (ref 5–10.5)
POTASSIUM SERPL-SCNC: 3.2 MMOL/L (ref 3.5–5.1)
RBC # BLD AUTO: 3.13 M/UL (ref 4–5.2)
SODIUM SERPL-SCNC: 145 MMOL/L (ref 136–145)
TRIGL SERPL-MCNC: 132 MG/DL (ref 0–150)
TROPONIN, HIGH SENSITIVITY: 44 NG/L (ref 0–14)
TROPONIN, HIGH SENSITIVITY: 52 NG/L (ref 0–14)
TSH SERPL DL<=0.005 MIU/L-ACNC: 1.94 UIU/ML (ref 0.27–4.2)
VLDLC SERPL CALC-MCNC: 26 MG/DL
WBC # BLD AUTO: 8.4 K/UL (ref 4–11)

## 2025-02-22 PROCEDURE — 36592 COLLECT BLOOD FROM PICC: CPT

## 2025-02-22 PROCEDURE — 85027 COMPLETE CBC AUTOMATED: CPT

## 2025-02-22 PROCEDURE — 84100 ASSAY OF PHOSPHORUS: CPT

## 2025-02-22 PROCEDURE — 84484 ASSAY OF TROPONIN QUANT: CPT

## 2025-02-22 PROCEDURE — 6360000002 HC RX W HCPCS: Performed by: STUDENT IN AN ORGANIZED HEALTH CARE EDUCATION/TRAINING PROGRAM

## 2025-02-22 PROCEDURE — 6370000000 HC RX 637 (ALT 250 FOR IP): Performed by: STUDENT IN AN ORGANIZED HEALTH CARE EDUCATION/TRAINING PROGRAM

## 2025-02-22 PROCEDURE — 2000000000 HC ICU R&B

## 2025-02-22 PROCEDURE — 2580000003 HC RX 258: Performed by: STUDENT IN AN ORGANIZED HEALTH CARE EDUCATION/TRAINING PROGRAM

## 2025-02-22 PROCEDURE — 6370000000 HC RX 637 (ALT 250 FOR IP): Performed by: INTERNAL MEDICINE

## 2025-02-22 PROCEDURE — 83735 ASSAY OF MAGNESIUM: CPT

## 2025-02-22 PROCEDURE — 80048 BASIC METABOLIC PNL TOTAL CA: CPT

## 2025-02-22 PROCEDURE — 2500000003 HC RX 250 WO HCPCS: Performed by: STUDENT IN AN ORGANIZED HEALTH CARE EDUCATION/TRAINING PROGRAM

## 2025-02-22 RX ORDER — NOREPINEPHRINE BITARTRATE 0.06 MG/ML
1-100 INJECTION, SOLUTION INTRAVENOUS CONTINUOUS
Status: DISCONTINUED | OUTPATIENT
Start: 2025-02-22 | End: 2025-02-22

## 2025-02-22 RX ORDER — MIDODRINE HYDROCHLORIDE 5 MG/1
5 TABLET ORAL 3 TIMES DAILY PRN
Status: DISCONTINUED | OUTPATIENT
Start: 2025-02-22 | End: 2025-02-25 | Stop reason: HOSPADM

## 2025-02-22 RX ADMIN — SODIUM CHLORIDE: 9 INJECTION, SOLUTION INTRAVENOUS at 03:04

## 2025-02-22 RX ADMIN — BUSPIRONE HYDROCHLORIDE 10 MG: 5 TABLET ORAL at 14:31

## 2025-02-22 RX ADMIN — BUSPIRONE HYDROCHLORIDE 10 MG: 5 TABLET ORAL at 21:02

## 2025-02-22 RX ADMIN — MELATONIN TAB 3 MG 3 MG: 3 TAB at 22:33

## 2025-02-22 RX ADMIN — SODIUM CHLORIDE, PRESERVATIVE FREE 10 ML: 5 INJECTION INTRAVENOUS at 21:02

## 2025-02-22 RX ADMIN — BUSPIRONE HYDROCHLORIDE 10 MG: 5 TABLET ORAL at 09:06

## 2025-02-22 RX ADMIN — ASPIRIN 81 MG: 81 TABLET, CHEWABLE ORAL at 09:06

## 2025-02-22 RX ADMIN — SODIUM CHLORIDE, PRESERVATIVE FREE 10 ML: 5 INJECTION INTRAVENOUS at 09:06

## 2025-02-22 RX ADMIN — ATORVASTATIN CALCIUM 40 MG: 40 TABLET, FILM COATED ORAL at 21:02

## 2025-02-22 RX ADMIN — POTASSIUM BICARBONATE 50 MEQ: 978 TABLET, EFFERVESCENT ORAL at 09:05

## 2025-02-22 RX ADMIN — ENOXAPARIN SODIUM 30 MG: 100 INJECTION SUBCUTANEOUS at 09:06

## 2025-02-22 RX ADMIN — MIDODRINE HYDROCHLORIDE 5 MG: 5 TABLET ORAL at 09:06

## 2025-02-22 ASSESSMENT — PAIN SCALES - GENERAL
PAINLEVEL_OUTOF10: 0

## 2025-02-22 ASSESSMENT — PAIN SCALES - PAIN ASSESSMENT IN ADVANCED DEMENTIA (PAINAD)
TOTALSCORE: 0
BREATHING: NORMAL
FACIALEXPRESSION: SMILING OR INEXPRESSIVE
BODYLANGUAGE: RELAXED
BODYLANGUAGE: RELAXED
CONSOLABILITY: NO NEED TO CONSOLE
CONSOLABILITY: NO NEED TO CONSOLE
BREATHING: NORMAL
TOTALSCORE: 0
FACIALEXPRESSION: SMILING OR INEXPRESSIVE
BREATHING: NORMAL
FACIALEXPRESSION: SMILING OR INEXPRESSIVE
TOTALSCORE: 0
CONSOLABILITY: NO NEED TO CONSOLE
CONSOLABILITY: NO NEED TO CONSOLE
BREATHING: NORMAL
TOTALSCORE: 0
FACIALEXPRESSION: SMILING OR INEXPRESSIVE
BODYLANGUAGE: RELAXED
BODYLANGUAGE: RELAXED

## 2025-02-22 ASSESSMENT — PAIN SCALES - WONG BAKER
WONGBAKER_NUMERICALRESPONSE: NO HURT

## 2025-02-22 NOTE — PROGRESS NOTES
[facility] ([facility])   Nephrology Consult Note  [phone]  [email or web address]        Patient: Jess Brand    MRN: [id number]    : 1935     Reason for Consult:    Acute Kidney Injury (ZAC)     Requesting Provider: Dr. Newton    History of Present Illness / Subjective:    Jess Brand is a 89 y.o. female with a PMHx of dementia, anxiety disorder, HTN, GERD, CKD stage 3a, chronic respiratory failure on 2L NC at baseline.   She was sent from [facility] for increased oxygen requirements and shortness of breath, with increased lethargy. Required 4L NC oxygen on arrival.   BP initially 118/88 then dropped to 80's and was started on levophed. She did not receive IV fluids given concern for volume overload and started on IV lasix. Respiratory viral panel was negative. Lactic acid 0.9. UA bland. No peripheral leucocytosis. Trop positive with ProBNP 2,376.     Her labs were relevant for Cr of 1.7 up to 2 today, and we were consulted in the setting of ZAC.     CXR showed Minimal bibasilar airspace opacities most likely due to atelectasis. Pulmonary vascular congestion and mild cardiomegaly.    CT Chest, abdomen and pelvis without contrast showed Mild dependent subsegmental atelectasis in the lung bases. Trace pleural effusions.. Moderate anterior wedging of T12 is a new finding since  imaging. Cholelithiasis.    CTH was negative for acute abnormality.     TTE 25: EF 70-75%, Indeterminate diastolic function. Normal RV systolic function. Mild regurgitation      Chart reviewed.  Patient was seen and examined. Daughters at bedside.   Was on 2L NC, on Levophed coming down on dose.   Awake but lethargic .   Urine output not recorded.     Home meds but needs review : lasix , ? Torsemide , metoprolol        Past Medical History   Active Ambulatory Problems     Diagnosis Date Noted    Chronic dermatitis of hands     Plantar fasciitis     Lumbar stenosis     Anxiety,  generalized     Allergic rhinitis     Tobacco use disorder     Hypertension, essential, benign     Delirium     Vitamin B12 deficiency 09/12/2016    Right-sided low back pain without sciatica 11/17/2016    Failure to thrive in adult 05/14/2017    Moderate episode of recurrent major depressive disorder (HCC) 05/14/2017    Neutropenia 07/13/2017    Multifactorial dementia (HCC) 07/30/2017    Syncope 08/31/2017    Hyponatremia 08/31/2017    Crossover toe deformity of left foot 03/10/2019    Bunion, left foot 03/10/2019    Syncope and collapse 04/18/2023    Fracture of neck of left humerus, closed, initial encounter 09/26/2023    Closed displaced intertrochanteric fracture of left femur (HCC) 09/27/2023     Resolved Ambulatory Problems     Diagnosis Date Noted    Hyponatremia 05/14/2017     Past Medical History:   Diagnosis Date    Arthritis     Dementia (HCC)     Depression     Full dentures     Impetigo     Intracervical pessary     Thrush     Uterus prolapse        Past Surgical History  Past Surgical History:   Procedure Laterality Date    BUNIONECTOMY Left 03/11/2019    LEFT FOOT BUNION CORRECTION WITH SOFT TISSUE RELEASE MEDIAL EMINENCE AND SECOND TOE AMPUTATION WITH MINI C-ARM (Left Foot)    BUNIONECTOMY Left 3/11/2019    LEFT FOOT BUNION CORRECTION WITH SOFT TISSUE RELEASE MEDIAL EMINENCE AND SECOND TOE AMPUTATION performed by Alba Lopes MD at [facility] OR    FEMUR FRACTURE SURGERY Left 9/27/2023    OPEN REDUCTION INTERNAL FIXATION LEFT FEMUR INTRAMEDULLARY NAILING performed by Robson Delgado MD at [facility] OR       Family History  family history includes Heart Disease in her father. She was adopted.    Social History  Social History     Socioeconomic History    Marital status:      Spouse name: Not on file    Number of children: Not on file    Years of education: Not on file    Highest education level: Not on file   Occupational History    Not on file   Tobacco Use    Smoking status: Former     Current

## 2025-02-22 NOTE — PROGRESS NOTES
Hospitalist Progress Note    Name:  Jess Brand    /Age/Sex: 1935  (89 y.o. female)  MRN & CSN:  [id number] & [id number]    PCP: No primary care provider on file.    Date of Admission: 2025    Patient Status:  Inpatient     Chief Complaint:   Chief Complaint   Patient presents with    Shortness of Breath     Pt [facility] from [facility] w/ cc of shortness of breath and increased oxygen requirement per facility. Pt on 2L at baseline but has been requiring 4L. Hx of dementia but more altered than normal per facility.        Hospital Course: The patient was admitted with a diagnosis of shock and was  on low-dose Levophed at 2 mcgs she has since been taken off   There is no clear etiology for the patient's low blood pressure. I suspect that she was not eating and drinking well and may have gotten a bit over diuresed,    Infectious workup so far has been negative and the patient's procalcitonin was 0.3 which is a very low probability of having any infection.        Subjective:  Today is:  Hospital Day: 2.  Patient seen and examined in ICU-3903/3903-.     She is pleasantly demented and having some loose stool today,      Medications:  Reviewed    Infusion Medications    sodium chloride       Scheduled Medications    busPIRone  10 mg Oral TID    sodium chloride flush  5-40 mL IntraVENous 2 times per day    enoxaparin  30 mg SubCUTAneous Daily    atorvastatin  40 mg Oral Nightly    aspirin  81 mg Oral Daily    midodrine  5 mg Oral TID WC     PRN Meds: melatonin, sodium chloride flush, sodium chloride, ondansetron **OR** ondansetron, polyethylene glycol, acetaminophen **OR** acetaminophen, sulfur hexafluoride microspheres      Intake/Output Summary (Last 24 hours) at 2025 1603  Last data filed at 2025 1400  Gross per 24 hour   Intake 2519.01 ml   Output 1100 ml   Net 1419.01 ml       Physical Exam Performed:    BP (!) 146/72   Pulse 67   Temp 97.8 °F (36.6 °C) (Temporal)

## 2025-02-23 LAB
ANION GAP SERPL CALCULATED.3IONS-SCNC: 8 MMOL/L (ref 3–16)
BACTERIA SPEC AEROBE CULT: ABNORMAL
BACTERIA SPEC AEROBE CULT: ABNORMAL
BUN SERPL-MCNC: 19 MG/DL (ref 7–20)
CALCIUM SERPL-MCNC: 9 MG/DL (ref 8.3–10.6)
CHLORIDE SERPL-SCNC: 106 MMOL/L (ref 99–110)
CO2 SERPL-SCNC: 31 MMOL/L (ref 21–32)
CREAT SERPL-MCNC: 1.3 MG/DL (ref 0.6–1.2)
DEPRECATED RDW RBC AUTO: 14.8 % (ref 12.4–15.4)
EST. AVERAGE GLUCOSE BLD GHB EST-MCNC: 116.9 MG/DL
FERRITIN SERPL IA-MCNC: 540 NG/ML (ref 15–150)
FOLATE SERPL-MCNC: >40 NG/ML (ref 4.78–24.2)
GFR SERPLBLD CREATININE-BSD FMLA CKD-EPI: 39 ML/MIN/{1.73_M2}
GLUCOSE SERPL-MCNC: 97 MG/DL (ref 70–99)
GRAM STN SPEC: ABNORMAL
HBA1C MFR BLD: 5.7 %
HCT VFR BLD AUTO: 29.3 % (ref 36–48)
HGB BLD-MCNC: 9.9 G/DL (ref 12–16)
IRON SATN MFR SERPL: 20 % (ref 15–50)
IRON SERPL-MCNC: 32 UG/DL (ref 37–145)
MAGNESIUM SERPL-MCNC: 1.87 MG/DL (ref 1.8–2.4)
MCH RBC QN AUTO: 29.9 PG (ref 26–34)
MCHC RBC AUTO-ENTMCNC: 33.9 G/DL (ref 31–36)
MCV RBC AUTO: 88.1 FL (ref 80–100)
ORGANISM: ABNORMAL
PHOSPHATE SERPL-MCNC: 2.7 MG/DL (ref 2.5–4.9)
PLATELET # BLD AUTO: 194 K/UL (ref 135–450)
PMV BLD AUTO: 8 FL (ref 5–10.5)
POTASSIUM SERPL-SCNC: 3.8 MMOL/L (ref 3.5–5.1)
RBC # BLD AUTO: 3.33 M/UL (ref 4–5.2)
SODIUM SERPL-SCNC: 145 MMOL/L (ref 136–145)
TIBC SERPL-MCNC: 160 UG/DL (ref 260–445)
VIT B12 SERPL-MCNC: 534 PG/ML (ref 211–911)
WBC # BLD AUTO: 10 K/UL (ref 4–11)

## 2025-02-23 PROCEDURE — 6370000000 HC RX 637 (ALT 250 FOR IP): Performed by: INTERNAL MEDICINE

## 2025-02-23 PROCEDURE — 83540 ASSAY OF IRON: CPT

## 2025-02-23 PROCEDURE — 2000000000 HC ICU R&B

## 2025-02-23 PROCEDURE — 6360000002 HC RX W HCPCS: Performed by: STUDENT IN AN ORGANIZED HEALTH CARE EDUCATION/TRAINING PROGRAM

## 2025-02-23 PROCEDURE — 6370000000 HC RX 637 (ALT 250 FOR IP): Performed by: STUDENT IN AN ORGANIZED HEALTH CARE EDUCATION/TRAINING PROGRAM

## 2025-02-23 PROCEDURE — 36592 COLLECT BLOOD FROM PICC: CPT

## 2025-02-23 PROCEDURE — 83735 ASSAY OF MAGNESIUM: CPT

## 2025-02-23 PROCEDURE — 82728 ASSAY OF FERRITIN: CPT

## 2025-02-23 PROCEDURE — 6360000002 HC RX W HCPCS: Performed by: INTERNAL MEDICINE

## 2025-02-23 PROCEDURE — 82746 ASSAY OF FOLIC ACID SERUM: CPT

## 2025-02-23 PROCEDURE — 2500000003 HC RX 250 WO HCPCS: Performed by: INTERNAL MEDICINE

## 2025-02-23 PROCEDURE — 2500000003 HC RX 250 WO HCPCS: Performed by: STUDENT IN AN ORGANIZED HEALTH CARE EDUCATION/TRAINING PROGRAM

## 2025-02-23 PROCEDURE — 82607 VITAMIN B-12: CPT

## 2025-02-23 PROCEDURE — 80048 BASIC METABOLIC PNL TOTAL CA: CPT

## 2025-02-23 PROCEDURE — 83550 IRON BINDING TEST: CPT

## 2025-02-23 PROCEDURE — 85027 COMPLETE CBC AUTOMATED: CPT

## 2025-02-23 PROCEDURE — 84100 ASSAY OF PHOSPHORUS: CPT

## 2025-02-23 PROCEDURE — 36415 COLL VENOUS BLD VENIPUNCTURE: CPT

## 2025-02-23 RX ORDER — LINEZOLID 2 MG/ML
600 INJECTION, SOLUTION INTRAVENOUS EVERY 12 HOURS
Status: DISCONTINUED | OUTPATIENT
Start: 2025-02-23 | End: 2025-02-24

## 2025-02-23 RX ORDER — FLUCONAZOLE 100 MG/1
200 TABLET ORAL DAILY
Status: DISCONTINUED | OUTPATIENT
Start: 2025-02-23 | End: 2025-02-25 | Stop reason: HOSPADM

## 2025-02-23 RX ADMIN — LINEZOLID 600 MG: 600 INJECTION, SOLUTION INTRAVENOUS at 20:26

## 2025-02-23 RX ADMIN — MICONAZOLE NITRATE: 20 POWDER TOPICAL at 12:13

## 2025-02-23 RX ADMIN — BUSPIRONE HYDROCHLORIDE 10 MG: 5 TABLET ORAL at 08:51

## 2025-02-23 RX ADMIN — MICONAZOLE NITRATE: 20 POWDER TOPICAL at 20:26

## 2025-02-23 RX ADMIN — ASPIRIN 81 MG: 81 TABLET, CHEWABLE ORAL at 08:51

## 2025-02-23 RX ADMIN — ATORVASTATIN CALCIUM 40 MG: 40 TABLET, FILM COATED ORAL at 20:20

## 2025-02-23 RX ADMIN — BUSPIRONE HYDROCHLORIDE 10 MG: 5 TABLET ORAL at 15:50

## 2025-02-23 RX ADMIN — LINEZOLID 600 MG: 600 INJECTION, SOLUTION INTRAVENOUS at 08:55

## 2025-02-23 RX ADMIN — FLUCONAZOLE 200 MG: 100 TABLET ORAL at 12:13

## 2025-02-23 RX ADMIN — ENOXAPARIN SODIUM 30 MG: 100 INJECTION SUBCUTANEOUS at 08:51

## 2025-02-23 RX ADMIN — SODIUM CHLORIDE, PRESERVATIVE FREE 10 ML: 5 INJECTION INTRAVENOUS at 20:22

## 2025-02-23 RX ADMIN — SODIUM CHLORIDE, PRESERVATIVE FREE 10 ML: 5 INJECTION INTRAVENOUS at 08:55

## 2025-02-23 RX ADMIN — BUSPIRONE HYDROCHLORIDE 10 MG: 5 TABLET ORAL at 20:20

## 2025-02-23 ASSESSMENT — PAIN SCALES - PAIN ASSESSMENT IN ADVANCED DEMENTIA (PAINAD)
BODYLANGUAGE: RELAXED
TOTALSCORE: 1
CONSOLABILITY: NO NEED TO CONSOLE
TOTALSCORE: 0
NEGVOCALIZATION: OCCASIONAL MOAN/GROAN, LOW SPEECH, NEGATIVE/DISAPPROVING QUALITY
CONSOLABILITY: NO NEED TO CONSOLE
CONSOLABILITY: NO NEED TO CONSOLE
BREATHING: NORMAL
TOTALSCORE: 1
FACIALEXPRESSION: SMILING OR INEXPRESSIVE
FACIALEXPRESSION: SMILING OR INEXPRESSIVE
BODYLANGUAGE: RELAXED
NEGVOCALIZATION: OCCASIONAL MOAN/GROAN, LOW SPEECH, NEGATIVE/DISAPPROVING QUALITY
FACIALEXPRESSION: SMILING OR INEXPRESSIVE
BREATHING: NORMAL
BREATHING: NORMAL
BODYLANGUAGE: RELAXED

## 2025-02-23 ASSESSMENT — PAIN SCALES - GENERAL
PAINLEVEL_OUTOF10: 1
PAINLEVEL_OUTOF10: 1
PAINLEVEL_OUTOF10: 0

## 2025-02-23 NOTE — PROGRESS NOTES
Hospitalist Progress Note    Name:  Jess Brand    /Age/Sex: 1935  (89 y.o. female)  MRN & CSN:  [id number] & [id number]    PCP: No primary care provider on file.    Date of Admission: 2025    Patient Status:  Inpatient     Chief Complaint:   Chief Complaint   Patient presents with    Shortness of Breath     [facility] w/ cc of shortness of breath and increased oxygen requirement per facility. Pt on 2L at baseline but has been requiring 4L. Hx of dementia but more altered than normal per facility.        Hospital Course: The patient was admitted with a diagnosis of shock and was  on low-dose Levophed at 2 mcgs she has since been taken off   There is no clear etiology for the patient's low blood pressure. I suspect that she was not eating and drinking well and may have gotten a bit over diuresed,    Infectious workup so far has been negative and the patient's procalcitonin was 0.3 which is a very low probability of having any infection.        Subjective:  Today is:  Hospital Day: 3.  Patient seen and examined in ICU-3903/3903-01.     She is pleasantly demented and having some loose stool  Again today,      Medications:  Reviewed    Infusion Medications    sodium chloride       Scheduled Medications    linezolid  600 mg IntraVENous Q12H    fluconazole  200 mg Oral Daily    miconazole   Topical BID    busPIRone  10 mg Oral TID    sodium chloride flush  5-40 mL IntraVENous 2 times per day    enoxaparin  30 mg SubCUTAneous Daily    atorvastatin  40 mg Oral Nightly    aspirin  81 mg Oral Daily     PRN Meds: magic butt balm, midodrine, melatonin, sodium chloride flush, sodium chloride, ondansetron **OR** ondansetron, polyethylene glycol, acetaminophen **OR** acetaminophen, sulfur hexafluoride microspheres      Intake/Output Summary (Last 24 hours) at 2025 1448  Last data filed at 2025 0600  Gross per 24 hour   Intake --   Output 400 ml   Net -400 ml        Physical Exam Performed:    BP (!) 145/60   Pulse 73   Temp 98.2 °F (36.8 °C) (Temporal)   Resp 18   Ht 1.549 m (5' 1\")   Wt 72.9 kg (160 lb 11.5 oz)   SpO2 94%   BMI 30.37 kg/m²     General appearance: No apparent distress, appears stated age and cooperative.   HEENT: Pupils equal, round, and reactive to light. Conjunctivae/corneas clear.  Neck: Supple, with full range of motion. No jugular venous distention. Trachea midline.  Respiratory:  Normal respiratory effort. Clear to auscultation, bilaterally without Rales/Wheezes/Rhonchi.  Cardiovascular: Regular rate and rhythm with normal S1/S2 without murmurs, rubs or gallops. No peripheral edema.  Abdomen: Soft, non-tender, non-distended with normal bowel sounds.  : No CVA tenderness.  Musculoskeletal: No clubbing or cyanosis.  Full range of motion without deformity.  Skin: Skin color, texture, turgor normal.  No rashes or lesions.  Neurologic: moving all extremities, alert to self   Psychiatric: pleasantly demented.  Capillary Refill: Brisk, 3 seconds, normal   Peripheral Pulses: +2 palpable, equal bilaterally       Labs:   Recent Labs     02/21/25  0630 02/22/25  0440 02/23/25  0555   WBC 7.0 8.4 10.0   HGB 9.9* 9.3* 9.9*   HCT 29.9* 28.1* 29.3*    192 194     Recent Labs     02/21/25  0630 02/22/25  0440 02/23/25  0555    145 145   K 3.8 3.2* 3.8    104 106   CO2 33* 33* 31   BUN 34* 30* 19   CREATININE 2.0* 1.5* 1.3*   CALCIUM 9.0 8.7 9.0   PHOS  --  3.9 2.7     Recent Labs     02/21/25  0138   AST 22   ALT 16   BILITOT 0.4   ALKPHOS 125     No results for input(s): \"INR\" in the last 72 hours.  Recent Labs     02/21/25  0830   CKTOTAL 25*       Urinalysis:      Lab Results   Component Value Date/Time    NITRU Negative 02/21/2025 03:15 AM    WBCUA 8 12/22/2020 10:00 PM    BACTERIA 1+ 12/22/2020 10:00 PM    RBCUA 1 12/22/2020 10:00 PM    BLOODU Negative 02/21/2025 03:15 AM    SPECGRAV 1.015 06/19/2017 05:33 PM    GLUCOSEU Negative  02/21/2025 03:15 AM       Radiology:  CT CHEST ABDOMEN PELVIS WO CONTRAST Additional Contrast? None   Final Result   1. Mild dependent subsegmental atelectasis in the lung bases. Trace pleural   effusions.   2. Moderate anterior wedging of T12 is a new finding since 2021 imaging.   3. Cholelithiasis.         CT Head W/O Contrast   Final Result   1. No acute intracranial abnormality.   2. Moderate to severe atrophy more prominent in the bilateral frontal and   temporal lobes.   3. Chronic microvascular ischemic changes.         XR CHEST PORTABLE   Final Result   1. Minimal bibasilar airspace opacities most likely due to atelectasis.   2. Pulmonary vascular congestion and mild cardiomegaly.   3. Possible trace bilateral pleural effusions.                 Assessment/Plan:    Active Hospital Problems    Diagnosis     Shock (HCC) [R57.9]          Hospital Day: 3    This is a 89 y.o. female who presented to [facility] on 2/21/2025 and is being treated for:      Shock unspecified etiology?  Hypervolemia.    Started on Levophed, which has been eaned off  She is getting some midodrine  -resolved- was liikely hypovolemic    Acute hypoxic respiratory failure   - resolved  - she was on 2 liters and does not wear any oxygen at the facility      ZAC on CKD  CKD stage 3a   Risk factors HTN and age.   Bl Cr appears to be at 1 but had Cr of 1.4 on 10/30/23  History of ZAC in September 2023 that resolved  Cr 1.3 today       Uncertain age anterior T12 wedging  -not present on previuous ct from 2021 but may not be new  Will hold off on any consultation at this point    Chronic GERD on PPI at home    Mood disorder on Zoloft and risperidone at home    Benign essential hypertension uses metoprolol  - on hold at this time     Elevated troponin likely demand ischemia currently trending down no active signs of chest pain warranting trending of troponin  -cardiology has signed off no plan to tx    History of pulmonary hypertension on  echocardiogram 8 to 10 years ago.   -echo done in house looks good          Discussed management of the case with nephrology  who recommended palliaitve care         Drugs that require monitoring for toxicity include: Lovenox and the method of monitoring was/is CBC for platelet abnormality    DVT ppx: Lovenox  GI ppx: PPI  Diet: ADULT DIET; Dysphagia - Minced and Moist; 5 carb choices (75 gm/meal)  Code Status: DNR-CCA    PT/OT Eval Status: not needed    Disposition:  Aniticpate d/c back to long term dementia unit in next 24 hours.        Mt Newton MD  2/23/2025  2:48 PM

## 2025-02-23 NOTE — PLAN OF CARE
Problem: Discharge Planning  Goal: Discharge to home or other facility with appropriate resources  Outcome: Progressing  Flowsheets (Taken 2/22/2025 2000)  Discharge to home or other facility with appropriate resources:   Identify barriers to discharge with patient and caregiver   Arrange for needed discharge resources and transportation as appropriate     Problem: Pain  Goal: Verbalizes/displays adequate comfort level or baseline comfort level  Outcome: Progressing     Problem: Skin/Tissue Integrity  Goal: Skin integrity remains intact  Description: 1.  Monitor for areas of redness and/or skin breakdown  2.  Assess vascular access sites hourly  3.  Every 4-6 hours minimum:  Change oxygen saturation probe site  4.  Every 4-6 hours:  If on nasal continuous positive airway pressure, respiratory therapy assess nares and determine need for appliance change or resting period  Outcome: Progressing     Problem: Safety - Adult  Goal: Free from fall injury  Outcome: Progressing

## 2025-02-23 NOTE — PROGRESS NOTES
[facility] ([facility])   Nephrology Consult Note  [phone]  [email or web address]        Patient: Jess Brand    MRN: [id number]    : 1935     Reason for Consult:    Acute Kidney Injury (ZAC)     Requesting Provider: Dr. Newton    History of Present Illness / Subjective:    Jess Brand is a 89 y.o. female with a PMHx of dementia, anxiety disorder, HTN, GERD, CKD stage 3a, chronic respiratory failure on 2L NC at baseline.   She was sent from [facility] for increased oxygen requirements and shortness of breath, with increased lethargy. Required 4L NC oxygen on arrival.   BP initially 118/88 then dropped to 80's and was started on levophed. She did not receive IV fluids given concern for volume overload and started on IV lasix. Respiratory viral panel was negative. Lactic acid 0.9. UA bland. No peripheral leucocytosis. Trop positive with ProBNP 2,376.     Her labs were relevant for Cr of 1.7 up to 2 today, and we were consulted in the setting of ZAC.     CXR showed Minimal bibasilar airspace opacities most likely due to atelectasis. Pulmonary vascular congestion and mild cardiomegaly.    CT Chest, abdomen and pelvis without contrast showed Mild dependent subsegmental atelectasis in the lung bases. Trace pleural effusions.. Moderate anterior wedging of T12 is a new finding since  imaging. Cholelithiasis.    CTH was negative for acute abnormality.     TTE 25: EF 70-75%, Indeterminate diastolic function. Normal RV systolic function. Mild regurgitation      Chart reviewed.  Patient was seen and examined. Daughters at bedside.   Was on 2L NC, on Levophed coming down on dose.   Awake but lethargic .   Urine output not recorded.     Home meds but needs review : lasix , ? Torsemide , metoprolol        Past Medical History   Active Ambulatory Problems     Diagnosis Date Noted    Chronic dermatitis of hands     Plantar fasciitis     Lumbar stenosis     Anxiety,

## 2025-02-24 LAB
ANION GAP SERPL CALCULATED.3IONS-SCNC: 9 MMOL/L (ref 3–16)
BUN SERPL-MCNC: 10 MG/DL (ref 7–20)
CALCIUM SERPL-MCNC: 9 MG/DL (ref 8.3–10.6)
CHLORIDE SERPL-SCNC: 106 MMOL/L (ref 99–110)
CO2 SERPL-SCNC: 28 MMOL/L (ref 21–32)
CREAT SERPL-MCNC: 1.1 MG/DL (ref 0.6–1.2)
DEPRECATED RDW RBC AUTO: 15.2 % (ref 12.4–15.4)
GFR SERPLBLD CREATININE-BSD FMLA CKD-EPI: 48 ML/MIN/{1.73_M2}
GLUCOSE SERPL-MCNC: 101 MG/DL (ref 70–99)
HCT VFR BLD AUTO: 32.1 % (ref 36–48)
HGB BLD-MCNC: 10.7 G/DL (ref 12–16)
MAGNESIUM SERPL-MCNC: 1.87 MG/DL (ref 1.8–2.4)
MCH RBC QN AUTO: 29.5 PG (ref 26–34)
MCHC RBC AUTO-ENTMCNC: 33.2 G/DL (ref 31–36)
MCV RBC AUTO: 88.7 FL (ref 80–100)
PHOSPHATE SERPL-MCNC: 3.5 MG/DL (ref 2.5–4.9)
PLATELET # BLD AUTO: 191 K/UL (ref 135–450)
PMV BLD AUTO: 8.1 FL (ref 5–10.5)
POTASSIUM SERPL-SCNC: 3.7 MMOL/L (ref 3.5–5.1)
RBC # BLD AUTO: 3.62 M/UL (ref 4–5.2)
SODIUM SERPL-SCNC: 143 MMOL/L (ref 136–145)
WBC # BLD AUTO: 7.2 K/UL (ref 4–11)

## 2025-02-24 PROCEDURE — 80048 BASIC METABOLIC PNL TOTAL CA: CPT

## 2025-02-24 PROCEDURE — 97162 PT EVAL MOD COMPLEX 30 MIN: CPT

## 2025-02-24 PROCEDURE — 83735 ASSAY OF MAGNESIUM: CPT

## 2025-02-24 PROCEDURE — 97535 SELF CARE MNGMENT TRAINING: CPT

## 2025-02-24 PROCEDURE — 6360000002 HC RX W HCPCS: Performed by: STUDENT IN AN ORGANIZED HEALTH CARE EDUCATION/TRAINING PROGRAM

## 2025-02-24 PROCEDURE — 97166 OT EVAL MOD COMPLEX 45 MIN: CPT

## 2025-02-24 PROCEDURE — 6370000000 HC RX 637 (ALT 250 FOR IP): Performed by: INTERNAL MEDICINE

## 2025-02-24 PROCEDURE — 6360000002 HC RX W HCPCS: Performed by: INTERNAL MEDICINE

## 2025-02-24 PROCEDURE — 2500000003 HC RX 250 WO HCPCS: Performed by: STUDENT IN AN ORGANIZED HEALTH CARE EDUCATION/TRAINING PROGRAM

## 2025-02-24 PROCEDURE — 97530 THERAPEUTIC ACTIVITIES: CPT

## 2025-02-24 PROCEDURE — 2000000000 HC ICU R&B

## 2025-02-24 PROCEDURE — 85027 COMPLETE CBC AUTOMATED: CPT

## 2025-02-24 PROCEDURE — 84100 ASSAY OF PHOSPHORUS: CPT

## 2025-02-24 PROCEDURE — 36415 COLL VENOUS BLD VENIPUNCTURE: CPT

## 2025-02-24 PROCEDURE — 97165 OT EVAL LOW COMPLEX 30 MIN: CPT

## 2025-02-24 PROCEDURE — 6370000000 HC RX 637 (ALT 250 FOR IP): Performed by: STUDENT IN AN ORGANIZED HEALTH CARE EDUCATION/TRAINING PROGRAM

## 2025-02-24 RX ORDER — METOPROLOL SUCCINATE 25 MG/1
25 TABLET, EXTENDED RELEASE ORAL DAILY
Status: DISCONTINUED | OUTPATIENT
Start: 2025-02-24 | End: 2025-02-25 | Stop reason: HOSPADM

## 2025-02-24 RX ORDER — LINEZOLID 600 MG/1
600 TABLET, FILM COATED ORAL EVERY 12 HOURS SCHEDULED
Status: DISCONTINUED | OUTPATIENT
Start: 2025-02-24 | End: 2025-02-25 | Stop reason: HOSPADM

## 2025-02-24 RX ORDER — RISPERIDONE 0.5 MG/1
0.25 TABLET ORAL 2 TIMES DAILY
Status: DISCONTINUED | OUTPATIENT
Start: 2025-02-24 | End: 2025-02-25 | Stop reason: HOSPADM

## 2025-02-24 RX ADMIN — LINEZOLID 600 MG: 600 TABLET, FILM COATED ORAL at 20:15

## 2025-02-24 RX ADMIN — ASPIRIN 81 MG: 81 TABLET, CHEWABLE ORAL at 09:37

## 2025-02-24 RX ADMIN — MICONAZOLE NITRATE: 20 POWDER TOPICAL at 20:16

## 2025-02-24 RX ADMIN — BUSPIRONE HYDROCHLORIDE 10 MG: 5 TABLET ORAL at 14:01

## 2025-02-24 RX ADMIN — METOPROLOL SUCCINATE 25 MG: 25 TABLET, EXTENDED RELEASE ORAL at 09:36

## 2025-02-24 RX ADMIN — RISPERIDONE 0.25 MG: 0.5 TABLET, FILM COATED ORAL at 20:16

## 2025-02-24 RX ADMIN — ENOXAPARIN SODIUM 30 MG: 100 INJECTION SUBCUTANEOUS at 09:37

## 2025-02-24 RX ADMIN — RISPERIDONE 0.25 MG: 0.5 TABLET, FILM COATED ORAL at 09:37

## 2025-02-24 RX ADMIN — SODIUM CHLORIDE, PRESERVATIVE FREE 10 ML: 5 INJECTION INTRAVENOUS at 20:16

## 2025-02-24 RX ADMIN — MICONAZOLE NITRATE: 20 POWDER TOPICAL at 09:37

## 2025-02-24 RX ADMIN — BUSPIRONE HYDROCHLORIDE 10 MG: 5 TABLET ORAL at 20:16

## 2025-02-24 RX ADMIN — LINEZOLID 600 MG: 600 INJECTION, SOLUTION INTRAVENOUS at 09:34

## 2025-02-24 RX ADMIN — SODIUM CHLORIDE, PRESERVATIVE FREE 10 ML: 5 INJECTION INTRAVENOUS at 09:38

## 2025-02-24 RX ADMIN — ATORVASTATIN CALCIUM 40 MG: 40 TABLET, FILM COATED ORAL at 20:16

## 2025-02-24 RX ADMIN — SERTRALINE 50 MG: 50 TABLET, FILM COATED ORAL at 20:16

## 2025-02-24 RX ADMIN — BUSPIRONE HYDROCHLORIDE 10 MG: 5 TABLET ORAL at 09:36

## 2025-02-24 RX ADMIN — FLUCONAZOLE 200 MG: 100 TABLET ORAL at 09:36

## 2025-02-24 NOTE — CARE COORDINATION
Chart reviewed for discharge planning.  Barrier(s) to discharge- remains on IV meds, not ready for d/c  Tentative discharge plan-back to LTC, no precert needed to return  Tentative discharge date-TBD    *Case management will continue to follow progress and update discharge plan as needed.    Electronically signed by VINNY Clark on 2/24/2025 at 8:50 AM

## 2025-02-24 NOTE — PROGRESS NOTES
[facility] ([facility])   Nephrology Consult Note  [phone]  [email or web address]        Patient: Jess Brand    MRN: [id number]    : 1935     Reason for Consult:    Acute Kidney Injury (ZAC)     Requesting Provider: Dr. Newton    History of Present Illness / Subjective:    Jess Brand is a 89 y.o. female with a PMHx of dementia, anxiety disorder, HTN, GERD, CKD stage 3a, chronic respiratory failure on 2L NC at baseline.   She was sent from [facility] for increased oxygen requirements and shortness of breath, with increased lethargy. Required 4L NC oxygen on arrival.   BP initially 118/88 then dropped to 80's and was started on levophed. She did not receive IV fluids given concern for volume overload and started on IV lasix. Respiratory viral panel was negative. Lactic acid 0.9. UA bland. No peripheral leucocytosis. Trop positive with ProBNP 2,376.     Her labs were relevant for Cr of 1.7 up to 2 today, and we were consulted in the setting of ZAC.     CXR showed Minimal bibasilar airspace opacities most likely due to atelectasis. Pulmonary vascular congestion and mild cardiomegaly.    CT Chest, abdomen and pelvis without contrast showed Mild dependent subsegmental atelectasis in the lung bases. Trace pleural effusions.. Moderate anterior wedging of T12 is a new finding since  imaging. Cholelithiasis.    CTH was negative for acute abnormality.     TTE 25: EF 70-75%, Indeterminate diastolic function. Normal RV systolic function. Mild regurgitation    Home meds but needs review : lasix , ? Torsemide , metoprolol    Interval events:   Seen this am. Stable hemodynamically.   BP in good range.   Appeared comfortable.         Past Medical History   Active Ambulatory Problems     Diagnosis Date Noted    Chronic dermatitis of hands     Plantar fasciitis     Lumbar stenosis     Anxiety, generalized     Allergic rhinitis     Tobacco use disorder     Hypertension,

## 2025-02-24 NOTE — PROGRESS NOTES
Hospitalist Progress Note    Name:  Jess Brand    /Age/Sex: 1935  (89 y.o. female)  MRN & CSN:  [id number] & [id number]    PCP: No primary care provider on file.    Date of Admission: 2025    Patient Status:  Inpatient     Chief Complaint:   Chief Complaint   Patient presents with    Shortness of Breath     Pt via [facility] from [facility] w/ cc of shortness of breath and increased oxygen requirement per facility. Pt on 2L at baseline but has been requiring 4L. Hx of dementia but more altered than normal per facility.        Hospital Course: The patient was admitted with a diagnosis of shock and was  on low-dose Levophed at 2 mcgs she has since been taken off   There is no clear etiology for the patient's low blood pressure. I suspect that she was not eating and drinking well and may have gotten a bit over diuresed,    Infectious workup so far has been negative and the patient's procalcitonin was 0.3 which is a very low probability of having any infection.        Subjective:  Today is:  Hospital Day: 4.  Patient seen and examined in ICU-3903/3903-01.     She is pleasantly demented and having some loose stool  Again today,        Medications:  Reviewed    Infusion Medications    sodium chloride       Scheduled Medications    metoprolol succinate  25 mg Oral Daily    risperiDONE  0.25 mg Oral BID    sertraline  50 mg Oral Nightly    linezolid  600 mg IntraVENous Q12H    fluconazole  200 mg Oral Daily    miconazole   Topical BID    busPIRone  10 mg Oral TID    sodium chloride flush  5-40 mL IntraVENous 2 times per day    enoxaparin  30 mg SubCUTAneous Daily    atorvastatin  40 mg Oral Nightly    aspirin  81 mg Oral Daily     PRN Meds: magic butt balm, midodrine, melatonin, sodium chloride flush, sodium chloride, ondansetron **OR** ondansetron, polyethylene glycol, acetaminophen **OR** acetaminophen, sulfur hexafluoride microspheres      Intake/Output Summary (Last 24 hours) at 2025  1651  Last data filed at 2/23/2025 2150  Gross per 24 hour   Intake 1064.58 ml   Output 325 ml   Net 739.58 ml       Physical Exam Performed:    BP (!) 132/59   Pulse 51   Temp 98.8 °F (37.1 °C) (Temporal)   Resp 16   Ht 1.549 m (5' 1\")   Wt 73.1 kg (161 lb 2.5 oz)   SpO2 98%   BMI 30.45 kg/m²     General appearance: No apparent distress, appears stated age and cooperative.   HEENT: Pupils equal, round, and reactive to light. Conjunctivae/corneas clear.  Neck: Supple, with full range of motion. No jugular venous distention. Trachea midline.  Respiratory:  Normal respiratory effort. Clear to auscultation, bilaterally without Rales/Wheezes/Rhonchi.  Cardiovascular: Regular rate and rhythm with normal S1/S2 without murmurs, rubs or gallops. No peripheral edema.  Abdomen: Soft, non-tender, non-distended with normal bowel sounds.  : No CVA tenderness.  Musculoskeletal: No clubbing or cyanosis.  Full range of motion without deformity.  Skin: Skin color, texture, turgor normal.  No rashes or lesions.  Neurologic: moving all extremities, alert to self   Psychiatric: pleasantly demented.  Capillary Refill: Brisk, 3 seconds, normal   Peripheral Pulses: +2 palpable, equal bilaterally       Labs:   Recent Labs     02/22/25  0440 02/23/25  0555 02/24/25  0805   WBC 8.4 10.0 7.2   HGB 9.3* 9.9* 10.7*   HCT 28.1* 29.3* 32.1*    194 191     Recent Labs     02/22/25  0440 02/23/25  0555 02/24/25  0805    145 143   K 3.2* 3.8 3.7    106 106   CO2 33* 31 28   BUN 30* 19 10   CREATININE 1.5* 1.3* 1.1   CALCIUM 8.7 9.0 9.0   PHOS 3.9 2.7 3.5     No results for input(s): \"AST\", \"ALT\", \"BILIDIR\", \"BILITOT\", \"ALKPHOS\" in the last 72 hours.    No results for input(s): \"INR\" in the last 72 hours.  No results for input(s): \"CKTOTAL\", \"TROPONINI\" in the last 72 hours.      Urinalysis:      Lab Results   Component Value Date/Time    NITRU Negative 02/21/2025 03:15 AM    WBCUA 8 12/22/2020 10:00 PM    BACTERIA 1+

## 2025-02-24 NOTE — PLAN OF CARE
Problem: Discharge Planning  Goal: Discharge to home or other facility with appropriate resources  Outcome: Progressing  Flowsheets (Taken 2/23/2025 2000)  Discharge to home or other facility with appropriate resources:   Identify barriers to discharge with patient and caregiver   Arrange for needed discharge resources and transportation as appropriate   Identify discharge learning needs (meds, wound care, etc)     Problem: Pain  Goal: Verbalizes/displays adequate comfort level or baseline comfort level  Outcome: Progressing     Problem: Skin/Tissue Integrity  Goal: Skin integrity remains intact  Description: 1.  Monitor for areas of redness and/or skin breakdown  2.  Assess vascular access sites hourly  3.  Every 4-6 hours minimum:  Change oxygen saturation probe site  4.  Every 4-6 hours:  If on nasal continuous positive airway pressure, respiratory therapy assess nares and determine need for appliance change or resting period  Outcome: Progressing  Flowsheets  Taken 2/23/2025 2000 by Mercedes Tello RN  Skin Integrity Remains Intact:   Monitor for areas of redness and/or skin breakdown   Every 4-6 hours minimum: Change oxygen saturation probe site   Assess vascular access sites hourly  Taken 2/23/2025 0800 by Rochelle Hall RN  Skin Integrity Remains Intact: Monitor for areas of redness and/or skin breakdown     Problem: Safety - Adult  Goal: Free from fall injury  Outcome: Progressing

## 2025-02-24 NOTE — PROGRESS NOTES
[facility] - Inpatient Rehabilitation Department   Phone: [phone]    Physical Therapy    [x] Initial Evaluation            [] Daily Treatment Note         [] Discharge Summary      Patient: Jess Brand   : 1935   MRN: [id number]   Date of Service:  2025  Admitting Diagnosis: Shock (HCC)  Current Admission Summary: 89 y.o. female who presents to the ER with flu like symptoms. She is from [facility] with shortness of breath and increased oxygen demand with fever   Past Medical History:  has a past medical history of Allergic rhinitis, Anxiety, generalized, Arthritis, Chronic dermatitis of hands, Dementia (HCC), Depression, Full dentures, Hypertension, essential, benign, Impetigo, Intracervical pessary, Lumbar stenosis, Plantar fasciitis, Right-sided low back pain without sciatica, Thrush, Tobacco use disorder, and Uterus prolapse.  Past Surgical History:  has a past surgical history that includes Bunionectomy (Left, 2019); Bunionectomy (Left, 3/11/2019); and Femur fracture surgery (Left, 2023).    Discharge Recommendations: Jess Brand scored a  on the AM-PAC short mobility form. Current research shows that an AM-PAC score of 17 or less is typically not associated with a discharge to the patient's home setting. Based on the patient's AM-PAC score and their current functional mobility deficits, it is recommended that the patient have 3-5 sessions per week of Physical Therapy at d/c to increase the patient's independence.  Please see assessment section for further patient specific details.    If patient discharges prior to next session this note will serve as a discharge summary.  Please see below for the latest assessment towards goals.      DME Required For Discharge: patient has all required DME for discharge  Precautions/Restrictions: high fall risk, contact precautions, modified diet  Weight Bearing Restrictions: no restrictions  [] Right Upper

## 2025-02-24 NOTE — PROGRESS NOTES
[facility] - Inpatient Rehabilitation Department   Phone: [phone]    Occupational Therapy    [x] Initial Evaluation            [] Daily Treatment Note         [] Discharge Summary      Patient: Jess Brand   : 1935   MRN: [id number]   Date of Service:  2025    Admitting Diagnosis:  Shock (HCC)  Current Admission Summary: Per ED note, \"Jess Brand is a 89 y.o. female who presents to the ER with flu like symptoms.  She is from [facility] with shortness of breath and increased oxygen demand with fever.  Wears 2 liters of oxygen at baseline.  Hx of dementia, reported that she I more altered than baseline \"  Past Medical History:  has a past medical history of Allergic rhinitis, Anxiety, generalized, Arthritis, Chronic dermatitis of hands, Dementia (HCC), Depression, Full dentures, Hypertension, essential, benign, Impetigo, Intracervical pessary, Lumbar stenosis, Plantar fasciitis, Right-sided low back pain without sciatica, Thrush, Tobacco use disorder, and Uterus prolapse.  Past Surgical History:  has a past surgical history that includes Bunionectomy (Left, 2019); Bunionectomy (Left, 3/11/2019); and Femur fracture surgery (Left, 2023).    Discharge Recommendations: Jess Brand scored a  on the AM-PAC ADL Inpatient form. Current research shows that an AM-PAC score of 17 or less is typically not associated with a discharge to the patient's home setting. Based on the patient's AM-PAC score and their current ADL deficits, it is recommended that the patient have 3-5 sessions per week of Occupational Therapy at d/c to increase the patient's independence.  Please see assessment section for further patient specific details.    If patient discharges prior to next session this note will serve as a discharge summary.  Please see below for the latest assessment towards goals.      DME Required For Discharge: patient has all required DME for  functional mobility completed on this date secondary to pt does not ambulate at baseline3.  Balance:  Static Sitting Balance: poor (-): requires max (A) to maintain balance  Comments: Dependent sitting balance    Other Therapeutic Interventions    Functional Outcomes  AM-PAC Inpatient Daily Activity Raw Score: 8                                    Cognition  Overall Cognitive Status: Impaired  Arousal/Alertness: delayed responses to stimuli, inconsistent responses to stimuli  Following Commands: follows one step commands with repetition, follows one step commands with increased time, inconsistently follows commands  Attention Span: difficulty attending to directions  Memory: decreased recall of biographical information, decreased recall of recent events, decreased short term memory, decreased long term memory  Safety Judgement: decreased awareness of need for safety  Problem Solving: assistance required to generate solutions, assistance required to implement solutions, decreased awareness of errors, assistance required to identify errors made, assistance required to correct errors made  Insights: not aware of deficits  Initiation: requires cues for all  Sequencing: requires cues for all  Orientation:    disoriented x 4 (oriented to first name only. When asked last name, pt kept repeating first name.  Command Following:   impaired     Education  Barriers To Learning: cognition and physical  Patient Education: patient educated on OT role and benefits, plan of care, discharge recommendations, bed mobility, grooming  Learning Assessment:  patient will require reinforcement due to cognitive deficits    Assessment  Activity Tolerance: Limited by weakness and cognition  Impairments Requiring Therapeutic Intervention: decreased ADL status, decreased safety awareness, decreased cognition, decreased endurance, decreased balance  Prognosis: guarded  Clinical Assessment: Pt is below her baseline level of occupational function,

## 2025-02-24 NOTE — PROGRESS NOTES
Spiritual Health History and Assessment/Progress Note  [facility]    (P) Spiritual/Emotional Needs,  ,  ,      Name: Jess Brand MRN: [id number]    Age: 89 y.o.     Sex: female   Language: English   Mandaen: Restorationist   Shock (HCC)     Date: 2/24/2025            Total Time Calculated: (P) 15 min              Spiritual Assessment began in [facility] ICU        Referral/Consult From: (P) Rounding   Encounter Overview/Reason: (P) Spiritual/Emotional Needs  Service Provided For: (P) Patient    Essie, Belief, Meaning:   Patient identifies as spiritual and is connected with a essie tradition or spiritual practice  Family/Friends No family/friends present      Importance and Influence:  Patient unable to assess at this time  Family/Friends No family/friends present    Community:  Patient is connected with a spiritual community and feels well-supported. Support system includes: Children and Extended family  Family/Friends No family/friends present    Assessment and Plan of Care:     Patient Interventions include: Facilitated expression of thoughts and feelings and Explored spiritual coping/struggle/distress  Family/Friends Interventions include: No family/friends present    Patient Plan of Care: No spiritual needs identified for follow-up and Spiritual Care available upon further referral  Family/Friends Plan of Care: No family/friends present    Electronically signed by Chaplain Peyton on 2/24/2025 at 2:37 PM

## 2025-02-25 VITALS
RESPIRATION RATE: 17 BRPM | HEART RATE: 85 BPM | SYSTOLIC BLOOD PRESSURE: 151 MMHG | OXYGEN SATURATION: 97 % | WEIGHT: 160.5 LBS | BODY MASS INDEX: 30.3 KG/M2 | TEMPERATURE: 98.3 F | HEIGHT: 61 IN | DIASTOLIC BLOOD PRESSURE: 67 MMHG

## 2025-02-25 PROBLEM — F39 MOOD DISORDER: Status: ACTIVE | Noted: 2025-02-25

## 2025-02-25 PROBLEM — R57.9 SHOCK (HCC): Status: RESOLVED | Noted: 2025-02-21 | Resolved: 2025-02-25

## 2025-02-25 LAB
ANION GAP SERPL CALCULATED.3IONS-SCNC: 8 MMOL/L (ref 3–16)
BACTERIA BLD CULT ORG #2: NORMAL
BACTERIA BLD CULT: NORMAL
BUN SERPL-MCNC: 9 MG/DL (ref 7–20)
CALCIUM SERPL-MCNC: 8.8 MG/DL (ref 8.3–10.6)
CHLORIDE SERPL-SCNC: 106 MMOL/L (ref 99–110)
CO2 SERPL-SCNC: 28 MMOL/L (ref 21–32)
CREAT SERPL-MCNC: 1.1 MG/DL (ref 0.6–1.2)
DEPRECATED RDW RBC AUTO: 14.8 % (ref 12.4–15.4)
GFR SERPLBLD CREATININE-BSD FMLA CKD-EPI: 48 ML/MIN/{1.73_M2}
GLUCOSE SERPL-MCNC: 112 MG/DL (ref 70–99)
HCT VFR BLD AUTO: 30.2 % (ref 36–48)
HGB BLD-MCNC: 10.2 G/DL (ref 12–16)
IRON SATN MFR SERPL: 32 % (ref 15–50)
IRON SERPL-MCNC: 50 UG/DL (ref 37–145)
MAGNESIUM SERPL-MCNC: 1.79 MG/DL (ref 1.8–2.4)
MCH RBC QN AUTO: 29.5 PG (ref 26–34)
MCHC RBC AUTO-ENTMCNC: 33.8 G/DL (ref 31–36)
MCV RBC AUTO: 87.2 FL (ref 80–100)
PHOSPHATE SERPL-MCNC: 3.2 MG/DL (ref 2.5–4.9)
PLATELET # BLD AUTO: 167 K/UL (ref 135–450)
PMV BLD AUTO: 7.6 FL (ref 5–10.5)
POTASSIUM SERPL-SCNC: 3.3 MMOL/L (ref 3.5–5.1)
RBC # BLD AUTO: 3.46 M/UL (ref 4–5.2)
SODIUM SERPL-SCNC: 142 MMOL/L (ref 136–145)
TIBC SERPL-MCNC: 156 UG/DL (ref 260–445)
WBC # BLD AUTO: 7.5 K/UL (ref 4–11)

## 2025-02-25 PROCEDURE — 6360000002 HC RX W HCPCS: Performed by: STUDENT IN AN ORGANIZED HEALTH CARE EDUCATION/TRAINING PROGRAM

## 2025-02-25 PROCEDURE — 80048 BASIC METABOLIC PNL TOTAL CA: CPT

## 2025-02-25 PROCEDURE — 6370000000 HC RX 637 (ALT 250 FOR IP): Performed by: STUDENT IN AN ORGANIZED HEALTH CARE EDUCATION/TRAINING PROGRAM

## 2025-02-25 PROCEDURE — 6370000000 HC RX 637 (ALT 250 FOR IP): Performed by: INTERNAL MEDICINE

## 2025-02-25 PROCEDURE — 36415 COLL VENOUS BLD VENIPUNCTURE: CPT

## 2025-02-25 PROCEDURE — 85027 COMPLETE CBC AUTOMATED: CPT

## 2025-02-25 PROCEDURE — 83540 ASSAY OF IRON: CPT

## 2025-02-25 PROCEDURE — 83735 ASSAY OF MAGNESIUM: CPT

## 2025-02-25 PROCEDURE — 84100 ASSAY OF PHOSPHORUS: CPT

## 2025-02-25 PROCEDURE — 83550 IRON BINDING TEST: CPT

## 2025-02-25 RX ORDER — MIDODRINE HYDROCHLORIDE 5 MG/1
5 TABLET ORAL 3 TIMES DAILY PRN
Qty: 90 TABLET | Refills: 3 | Status: SHIPPED | OUTPATIENT
Start: 2025-02-25

## 2025-02-25 RX ORDER — LINEZOLID 600 MG/1
600 TABLET, FILM COATED ORAL EVERY 12 HOURS SCHEDULED
Qty: 20 TABLET | Refills: 0 | Status: SHIPPED | OUTPATIENT
Start: 2025-02-25 | End: 2025-02-25 | Stop reason: HOSPADM

## 2025-02-25 RX ORDER — SULFAMETHOXAZOLE AND TRIMETHOPRIM 800; 160 MG/1; MG/1
1 TABLET ORAL 2 TIMES DAILY
Qty: 20 TABLET | Refills: 0 | Status: SHIPPED | OUTPATIENT
Start: 2025-02-25 | End: 2025-03-07

## 2025-02-25 RX ORDER — TORSEMIDE 20 MG/1
60 TABLET ORAL DAILY
Status: DISCONTINUED | OUTPATIENT
Start: 2025-02-25 | End: 2025-02-25 | Stop reason: HOSPADM

## 2025-02-25 RX ORDER — FLUCONAZOLE 200 MG/1
200 TABLET ORAL DAILY
Qty: 3 TABLET | Refills: 0 | Status: SHIPPED | OUTPATIENT
Start: 2025-02-25 | End: 2025-02-28

## 2025-02-25 RX ORDER — SULFAMETHOXAZOLE AND TRIMETHOPRIM 800; 160 MG/1; MG/1
1 TABLET ORAL 2 TIMES DAILY
Qty: 20 TABLET | Refills: 0 | Status: SHIPPED | OUTPATIENT
Start: 2025-02-25 | End: 2025-02-25

## 2025-02-25 RX ORDER — ATORVASTATIN CALCIUM 40 MG/1
40 TABLET, FILM COATED ORAL NIGHTLY
Qty: 30 TABLET | Refills: 3 | Status: SHIPPED | OUTPATIENT
Start: 2025-02-25

## 2025-02-25 RX ORDER — ONDANSETRON 4 MG/1
4 TABLET, ORALLY DISINTEGRATING ORAL EVERY 8 HOURS PRN
Qty: 15 TABLET | Refills: 0 | Status: SHIPPED | OUTPATIENT
Start: 2025-02-25

## 2025-02-25 RX ADMIN — MICONAZOLE NITRATE: 20 POWDER TOPICAL at 09:43

## 2025-02-25 RX ADMIN — LINEZOLID 600 MG: 600 TABLET, FILM COATED ORAL at 09:35

## 2025-02-25 RX ADMIN — METOPROLOL SUCCINATE 25 MG: 25 TABLET, EXTENDED RELEASE ORAL at 09:35

## 2025-02-25 RX ADMIN — FLUCONAZOLE 200 MG: 100 TABLET ORAL at 09:35

## 2025-02-25 RX ADMIN — ASPIRIN 81 MG: 81 TABLET, CHEWABLE ORAL at 09:35

## 2025-02-25 RX ADMIN — BUSPIRONE HYDROCHLORIDE 10 MG: 5 TABLET ORAL at 09:35

## 2025-02-25 RX ADMIN — ENOXAPARIN SODIUM 30 MG: 100 INJECTION SUBCUTANEOUS at 09:42

## 2025-02-25 RX ADMIN — CHOLESTYRAMINE: 4 POWDER, FOR SUSPENSION ORAL at 09:43

## 2025-02-25 RX ADMIN — RISPERIDONE 0.25 MG: 0.5 TABLET, FILM COATED ORAL at 09:35

## 2025-02-25 ASSESSMENT — PAIN SCALES - PAIN ASSESSMENT IN ADVANCED DEMENTIA (PAINAD)
BREATHING: NORMAL
BREATHING: NORMAL
TOTALSCORE: 0
TOTALSCORE: 0
FACIALEXPRESSION: SMILING OR INEXPRESSIVE
CONSOLABILITY: NO NEED TO CONSOLE
BODYLANGUAGE: RELAXED
BODYLANGUAGE: RELAXED
CONSOLABILITY: NO NEED TO CONSOLE
FACIALEXPRESSION: SMILING OR INEXPRESSIVE

## 2025-02-25 ASSESSMENT — PAIN SCALES - GENERAL: PAINLEVEL_OUTOF10: 0

## 2025-02-25 NOTE — PROGRESS NOTES
PALLIATIVE MEDICINE PROGRESS NOTE     Patient name:Jess Brand    MRN:[id number] :1935  Room/Bed:ICU-3903/3903-01    LOS: 4 days        ASSESSMENT/RECOMMENDATIONS   89 y.o. female with AMS and shock with severe dementia at baseline         Symptom Management:  AMS- pt drowsy with minimal verbal responses   Shock-cultures positive for staph has several open area is on the skin  Goals of Care-talk to patient's daughter Shayy by phone.  DNRCCA. We discussed pt having support at DC with the goal being not to return to hospital. Shayy states that she has signed pt up with palliative care at LTC facility. We discussed Hospice vs palliative care daughter appreciated the education.      Patient/Family Goals of Care :      talk to patient's daughter Ruma at the bedside.  She says her sister Shayy is the healthcare POA she is gone home to take a shower and get some rest.  She reports that the goal of care is comfort for the patient the family is hopeful with 24 hours of support that she will improve.  We discussed that regardless of improvement with patient's severe dementia and open areas on skin infection could reoccur.  We discussed that if the goal is comfort hospice supportive discharge would be the best option.  Family is going to talk it over we discussed if in 24 hours she has not improved that they want to consider minimizing supportive measures. Pt is DNRCCA at present per family no intubation, CPR or shocks wanted.       -talk to patient's daughter Shayy by phone.  DNRCCA. We discussed pt having support at DC with the goal being not to return to hospital. Shayy states that she has signed pt up with palliative care at LTC facility. We discussed Hospice vs palliative care daughter appreciated the education.      Disposition/Discharge Plan:   Pending      Advance Directives:     The patient has appointed the following active healthcare agents:    Primary Decision Maker: Shayy Fried -  results reviewed: CBC and BMP    Risk of Complications/Morbidity: High   Illness(es)/ Infection present that pose threat to bodily function.   There is potential for severe exacerbation of condition/side effects of treatment.  Therapy requires intensive monitoring for toxicity    Time spent on Advanced care Plann[facility] minutes    Counseling and educating the patient/family/caregiver  Preparing to see the patient (e.g., review of tests)  Referring / communicating with other healthcare professionals including care coordination (not separately reported): Hospitalist, Case management  Documenting clinical information in the electronic health record     Signed By: Electronically signed by MESFIN Morin CNP on 2025 at 9:55 AM   Palliative Medicine     2025

## 2025-02-25 NOTE — DISCHARGE INSTR - COC
Continuity of Care Form    Patient Name: Jess Brand   :  1935  MRN:  [id number]    Admit date:  2025  Discharge date:  2025      Code Status Order: DNR-CCA   Advance Directives:   Advance Care Flowsheet Documentation             Admitting Physician:  No admitting provider for patient encounter.  PCP: No primary care provider on file.    Discharging Nurse: Rene Roman  Discharging Hospital Unit/Room#: ICU-3903/3903-01  Discharging Unit Phone Number: [phone]    Emergency Contact:   Extended Emergency Contact Information  Primary Emergency Contact: Shayy Fried  Address: [address]  Home Phone: [phone]  Work Phone: [phone]  Mobile Phone: [phone]  Relation: Child  Secondary Emergency Contact: Ruma Giordano           [facility]  Home Phone: [phone]  Mobile Phone: [phone]  Relation: Child    Past Surgical History:  Past Surgical History:   Procedure Laterality Date    BUNIONECTOMY Left 2019    LEFT FOOT BUNION CORRECTION WITH SOFT TISSUE RELEASE MEDIAL EMINENCE AND SECOND TOE AMPUTATION WITH MINI C-ARM (Left Foot)    BUNIONECTOMY Left 3/11/2019    LEFT FOOT BUNION CORRECTION WITH SOFT TISSUE RELEASE MEDIAL EMINENCE AND SECOND TOE AMPUTATION performed by Alba Lopes MD at [facility] OR    FEMUR FRACTURE SURGERY Left 2023    OPEN REDUCTION INTERNAL FIXATION LEFT FEMUR INTRAMEDULLARY NAILING performed by Robson Delgado MD at [facility] OR       Immunization History:   Immunization History   Administered Date(s) Administered    COVID-19, PFIZER Bivalent, DO NOT Dilute, (age 12y+), IM, 30 mcg/0.3 mL 2022, 2023    COVID-19, PFIZER GRAY top, DO NOT Dilute, (age 12 y+), IM, 30 mcg/0.3 mL 2022    COVID-19, PFIZER PURPLE top, DILUTE for use, (age 12 y+), 30mcg/0.3mL 2020, 2021, 10/13/2021    Influenza 2013    Influenza Vaccine, unspecified  no  Last Modified Barium Swallow with Video (Video Swallowing Test): not done    Treatments at the Time of Hospital Discharge:   Respiratory Treatments: None  Oxygen Therapy:  is not on home oxygen therapy.  Ventilator:    - No ventilator support    Rehab Therapies: Physical Therapy and Occupational Therapy  Weight Bearing Status/Restrictions: No weight bearing restrictions  Other Medical Equipment (for information only, NOT a DME order):  N/A  Other Treatments: None    Patient's personal belongings (please select all that are sent with patient):  Dentures upper    RN SIGNATURE:  Electronically signed by Rene Roman RN on 2/25/25 at 9:17 AM EST    CASE MANAGEMENT/SOCIAL WORK SECTION    Inpatient Status Date: ***    Readmission Risk Assessment Score:  [facility] RISK OF UNPLANNED READMISSION 2.0             13.6 Total Score        Discharging to Facility/ Agency   [facility]  [address] [place] [address]  Phone:[phone]  Fax:[phone]      / signature: Electronically signed by Roz Paredes RN on 2/25/25 at 9:24 AM EST    PHYSICIAN SECTION    Prognosis: Fair    Condition at Discharge: Stable    Rehab Potential (if transferring to Rehab): Fair    Recommended Labs or Other Treatments After Discharge: Bactrim BID 10 days    Physician Certification: I certify the above information and transfer of Jess Brand  is necessary for the continuing treatment of the diagnosis listed and that she requires Skilled Nursing Facility for greater 30 days.     Update Admission H&P: No change in H&P    PHYSICIAN SIGNATURE:  Electronically signed by Francisco Gupta DO on 2/25/25 at 8:42 AM EST

## 2025-02-25 NOTE — PLAN OF CARE
Daughter inquired about Pt pessary being cleaned while admitted. No gyn MD available per Dr. Burgess  Afebrile  Up to chair.   Daughter Shayy updated.   Report given to Mercedes HOROWITZ        Problem: Discharge Planning  Goal: Discharge to home or other facility with appropriate resources  Outcome: Progressing     Problem: Pain  Goal: Verbalizes/displays adequate comfort level or baseline comfort level  Outcome: Progressing     Problem: Skin/Tissue Integrity  Goal: Skin integrity remains intact  Description: 1.  Monitor for areas of redness and/or skin breakdown  2.  Assess vascular access sites hourly  3.  Every 4-6 hours minimum:  Change oxygen saturation probe site  4.  Every 4-6 hours:  If on nasal continuous positive airway pressure, respiratory therapy assess nares and determine need for appliance change or resting period  Outcome: Progressing     Problem: Safety - Adult  Goal: Free from fall injury  Outcome: Progressing     Problem: Gastrointestinal - Adult  Goal: Maintains or returns to baseline bowel function  Outcome: Progressing

## 2025-02-25 NOTE — PLAN OF CARE
Problem: Discharge Planning  Goal: Discharge to home or other facility with appropriate resources  2/24/2025 2000 by Mercedes Tello RN  Outcome: Progressing  Flowsheets (Taken 2/24/2025 1954)  Discharge to home or other facility with appropriate resources:   Identify barriers to discharge with patient and caregiver   Arrange for needed discharge resources and transportation as appropriate   Identify discharge learning needs (meds, wound care, etc)  2/24/2025 1908 by Lucila Mendoza RN  Outcome: Progressing     Problem: Pain  Goal: Verbalizes/displays adequate comfort level or baseline comfort level  2/24/2025 2000 by Mercedes Tello RN  Outcome: Progressing  2/24/2025 1908 by Lucila Mendoza RN  Outcome: Progressing     Problem: Skin/Tissue Integrity  Goal: Skin integrity remains intact  Description: 1.  Monitor for areas of redness and/or skin breakdown  2.  Assess vascular access sites hourly  3.  Every 4-6 hours minimum:  Change oxygen saturation probe site  4.  Every 4-6 hours:  If on nasal continuous positive airway pressure, respiratory therapy assess nares and determine need for appliance change or resting period  2/24/2025 2000 by Mercedes Tello RN  Outcome: Progressing  Flowsheets (Taken 2/24/2025 1954)  Skin Integrity Remains Intact:   Monitor for areas of redness and/or skin breakdown   Assess vascular access sites hourly   Every 4-6 hours minimum: Change oxygen saturation probe site  2/24/2025 1908 by Lucila Mendoza RN  Outcome: Progressing     Problem: Safety - Adult  Goal: Free from fall injury  2/24/2025 2000 by Mercedes Tello RN  Outcome: Progressing  2/24/2025 1908 by Lucila Mendoza RN  Outcome: Progressing     Problem: Gastrointestinal - Adult  Goal: Maintains or returns to baseline bowel function  2/24/2025 2000 by Mercedes Tello RN  Outcome: Progressing  Flowsheets (Taken 2/24/2025 1954)  Maintains or returns to baseline bowel function:   Assess bowel function   Encourage

## 2025-02-25 NOTE — PROGRESS NOTES
Attempted to call report to [place] [facility].   transferred call to multiple locations with no answer.  Transferred call to a line with a voice mail box and message left with ICU call back number

## 2025-02-25 NOTE — CARE COORDINATION
Discharge Planning:     (CHRISTINE)    Called and left a VM with Ro [phone] at [facility] to inform her the pt was ready for DC and see when I can set up DC. Awaiting call back.     Electronically signed by Roz Paredes RN on 2/25/2025 at 8:30 AM    Discharge Planning:     (CM)    Ro returned call and is able to take pt anytime. Transport set at 10am.     Electronically signed by Roz Paredes RN on 2/25/2025 at 8:48 AM

## 2025-02-25 NOTE — DISCHARGE SUMMARY
Hospital Medicine Discharge Summary    Name:  Jess Brand  Gender: female  : 1935  89 y.o.  MRN: [id number]    PCP: No primary care provider on file.     Date of Admission:  2025  1:04 AM  Discharge Date: 2025    Admitting Physician: No admitting provider for patient encounter.  Discharge Physician: Francisco Gupta DO    Communication to PCP  -discharge on Bactrim BID for 10 days for positive MRSA wound culture      Discharge Diagnoses:       Active Hospital Problems    Diagnosis     Mood disorder [F39]     Dementia (HCC) [F03.90]     HTN (hypertension) [I10]        The patient was seen and examined on day of discharge and this discharge summary is in conjunction with any daily progress note from day of discharge.    Hospital Course:  Jess Brand is a 89 y.o. year old female who presented to [facility] on 2025  1:04 AM.      The patient was admitted with a diagnosis of shock and was  on low-dose Levophed at 2 mcgs she has since been taken off   There is no clear etiology for the patient's low blood pressure. May have gotten a bit over diuresed.     Infectious workup so far has been negative and the patient's procalcitonin was 0.3 which is a very low probability of having any infection.      Patient was hydrated and eventually weaned off pressors.  Patient did have a coccyx wound cultured which showed MRSA.  Patient will be discharged on Bactrim.  Otherwise, cultures remain negative.    On the last day of hospital stay, patient was doing well.  Has dementia at baseline, and was at her baseline.  No pain.  The patient expressed appropriate understanding of and agreement with the discharge recommendations, medications, and plan.      Physical Exam Performed:     BP (!) 145/63   Pulse 79   Temp 98.1 °F (36.7 °C) (Temporal)   Resp 15   Ht 1.549 m (5' 1\")   Wt 72.8 kg (160 lb 7.9 oz)   SpO2 96%   BMI 30.33 kg/m²       General appearance:  No apparent distress, appears  imaging.   3. Cholelithiasis.         CT Head W/O Contrast   Final Result   1. No acute intracranial abnormality.   2. Moderate to severe atrophy more prominent in the bilateral frontal and   temporal lobes.   3. Chronic microvascular ischemic changes.         XR CHEST PORTABLE   Final Result   1. Minimal bibasilar airspace opacities most likely due to atelectasis.   2. Pulmonary vascular congestion and mild cardiomegaly.   3. Possible trace bilateral pleural effusions.                Consults:     IP CONSULT TO CARDIOLOGY  IP CONSULT TO NEPHROLOGY  IP CONSULT TO PALLIATIVE CARE    F/U APPTS:  No follow-up provider specified.    Diet:  regular diet     Activity:  activity as tolerated    Disposition:  Discharged to SNF    Rehab: Patient returned to prior level of function, rehabilitation not indicated at this time    Condition at Discharge: Stable    Code Status:  DNR-CCA     Discharge Medications:     Current Discharge Medication List             Details   atorvastatin (LIPITOR) 40 MG tablet Take 1 tablet by mouth nightly  Qty: 30 tablet, Refills: 3      fluconazole (DIFLUCAN) 200 MG tablet Take 1 tablet by mouth daily for 3 days  Qty: 3 tablet, Refills: 0      miconazole (MICOTIN) 2 % powder Apply topically 2 times daily.  Qty: 45 g, Refills: 1      midodrine (PROAMATINE) 5 MG tablet Take 1 tablet by mouth 3 times daily as needed (systolic < 100)  Qty: 90 tablet, Refills: 3      ondansetron (ZOFRAN-ODT) 4 MG disintegrating tablet Take 1 tablet by mouth every 8 hours as needed for Nausea or Vomiting  Qty: 15 tablet, Refills: 0      linezolid (ZYVOX) 600 MG tablet Take 1 tablet by mouth every 12 hours for 10 days  Qty: 20 tablet, Refills: 0                Details   bisacodyl (DULCOLAX) 10 MG suppository Place 1 suppository rectally daily as needed for Constipation      hydrOXYzine pamoate (VISTARIL) 25 MG capsule Take 1 capsule by mouth 3 times daily as needed for Itching      metoprolol succinate (TOPROL XL) 25 MG

## 2025-02-25 NOTE — CARE COORDINATION
Case Management -  Discharge Note      Patient Name: Jess Brand                   YOB: 1935  Room: [facility]-[address] [place] [address]            Readmission Risk (Low < 19, Mod (19-27), High > 27): Readmission Risk Score: 13.6    Current PCP: No primary care provider on file.      (IMM) Important Message from Medicare:    Has pt received appropriate compliance notices before being discharged if required: N/A    PT AM-PAC: 6 /24  OT AM-PAC: 8 /24    Patient/patient representative has been educated on the benefits of SNF as well as the possible risks of declining recommended services. Patient/patient representative has acknowledged the information provided and decided on the following discharge plan. Patient/ patient representative has been provided freedom of choice regarding service provider, supported by basic dialogue that supports the patient's individualized plan of care/goals.    Patient noted to have a discharge order.  Pt has been medically cleared for transition to Skilled Nursing Rehab Facility    [facility]  [address] [place] [address]  Phone:[phone]  Fax:[phone]         HENS Completed:  NA  Pre-cert required/obtained:  NA    Transportation scheduled for 1000  Transportation provided by [employer]  ([phone])  AVS faxed and agency notified:  Yes  The following prescriptions sent with pt: NA  Family Notified:  Yes    Nurse to call report to facility and did not receive a call back.       Financial    [facility] / Plan: [employer] [facility] DUAL / Product Type: *No Product type* /     Pharmacy:  Potential assistance Purchasing Medications: No  Meds-to-Beds request: No      [facility] - [place], [place] - [address] - P [phone] - F [phone]  [address] [facility] [address]  Phone: [phone] Fax: [phone]      Notes:    Additional Case

## 2025-03-11 NOTE — PROGRESS NOTES
Physician Progress Note      PATIENT:               LEXI MILLER  CSN #:                  [id number]  :                       1935  ADMIT DATE:       2025 1:04 AM  DISCH DATE:        2025 10:21 AM  RESPONDING  PROVIDER #:        Francisco Gupta DO          QUERY TEXT:    Pt admitted with SOB, fluid overload and has \"Shock unspecified etiology?    Hypervolemia\" documented in H&P. If possible, please document in progress   notes and discharge summary further specificity regarding the type of shock:    The medical record reflects the following:  Risk Factors: 88 yo, hypoxia, fluid overload, HTN, CKD  Clinical Indicators: ZAC. Lethargic. SBP 80-90's  Treatment: Levophed, IVF, Midodrine, nephrology consult, palliative consult,   cardiology consult  Options provided:  -- Cardiogenic Shock  -- Obstructive Shock  -- Other - I will add my own diagnosis  -- Disagree - Not applicable / Not valid  -- Disagree - Clinically unable to determine / Unknown  -- Refer to Clinical Documentation Reviewer    PROVIDER RESPONSE TEXT:    This patient is in obstructive shock.    Query created by: Elva Barnes on 2025 11:28 AM      Electronically signed by:  Francisco Gupta DO 3/11/2025 2:49 PM

## 2025-04-14 NOTE — PROGRESS NOTES
Physician Progress Note      PATIENT:               LEXI MILLER  [facility] #:                  [id number]  :                       1935  ADMIT DATE:       2025 1:04 AM  DISCH DATE:        2025 10:21 AM  RESPONDING  PROVIDER #:        Francisco Gupta DO          QUERY TEXT:    Acute respiratory failure is documented in the medical record in H&P on .   Please provide additional clinical indicators supportive of your   documentation. Or please document if the diagnosis of acute respiratory   failure has been ruled out after study.    The clinical indicators include:  89 y.o. female with a PMHx of dementia, anxiety disorder, HTN, GERD, CKD stage   3a, chronic respiratory failure on 2L NC at baseline.    -2L at baseline and she was increased to 4LNC and weaned quickly.    -ED provider note on : Effort: Pulmonary effort is normal. No respiratory   distress.    -Trace pleural effusions on imaging with Mild dependent subsegmental   atelectasis    -cardiology consult, O2 1-4L  Options provided:  -- Acute Respiratory Failure ruled out after study and Chronic Respiratory   Failure confirmed  -- Acute Respiratory Failure as evidenced by, Please document evidence.  -- Acute Respiratory Failure ruled out after study  -- Other - I will add my own diagnosis  -- Disagree - Not applicable / Not valid  -- Disagree - Clinically unable to determine / Unknown  -- Refer to Clinical Documentation Reviewer    PROVIDER RESPONSE TEXT:    Acute Respiratory Failure has been ruled out after study.    Query created by: Elva Barnes on 4/3/2025 3:53 PM      QUERY TEXT:    Skin ulcer of sacrum is documented in the medical record in the wound care   consult on .  Please clarify the POA status:    The clinical indicators include:  88 yo F admitted with shock. pmh: dementia, anxiety disorder, HTN, GERD, CKD   stage 3a, chronic respiratory failure on 2L NC at baseline.    -discharge summary on :  Patient did have a

## 2025-04-17 NOTE — H&P
RN spoke with Ms Myrtle  to verify ride home after procedure and updated her on the procedural timeline. She verbalized understanding and text system verification received.   vaginal cream Place 2 g vaginally once a week   Yes Historical Provider, MD   hydrocortisone (HYTONE) 2.5 % lotion Apply 1 each topically at bedtime Apply to BLE   Yes Historical Provider, MD   furosemide (LASIX) 20 MG tablet Take 2.5 tablets by mouth daily   Yes Historical Provider, MD   loperamide (IMODIUM) 2 MG capsule Take 1 capsule by mouth 4 times daily as needed for Diarrhea   Yes Historical Provider, MD   omeprazole (PRILOSEC) 40 MG delayed release capsule Take 1 capsule by mouth daily   Yes Historical Provider, MD   potassium chloride (KLOR-CON M) 20 MEQ extended release tablet Take 1 tablet by mouth 2 times daily   Yes Historical Provider, MD   risperiDONE (RISPERDAL) 1 MG tablet Take 1 tablet by mouth at bedtime   Yes Historical Provider, MD   traMADol (ULTRAM) 50 MG tablet Take 1 tablet by mouth in the morning and at bedtime. Max Daily Amount: 100 mg   Yes Historical Provider, MD   traMADol (ULTRAM) 50 MG tablet Take 1 tablet by mouth every 6 hours as needed for Pain. Max Daily Amount: 200 mg   Yes Historical Provider, MD   nystatin (MYCOSTATIN) 533777 UNIT/GM powder Apply 1 g topically 2 times daily as needed itching   Yes Historical Provider, MD   melatonin 3 MG TABS tablet Take 1 tablet by mouth nightly as needed    Historical Provider, MD   acetaminophen (TYLENOL) 325 MG tablet Take 2 tablets by mouth every 4 hours as needed for Pain    Historical Provider, MD   glycerin-hypromellose- 0.2-0.2-1 % SOLN opthalmic solution Place 1 drop into both eyes every 4 hours as needed (dry eyes)    Historical Provider, MD   metoprolol tartrate (LOPRESSOR) 25 MG tablet Take 0.5 tablets by mouth 2 times daily 7/31/17   Historical Provider, MD   bisacodyl (DUCODYL) 5 MG EC tablet Take 1 tablet by mouth daily as needed for Constipation 7/13/17   Selin Hwang MD       Allergies:  Latex, Oxycodone, and Strawberry extract      Social History:   TOBACCO:   reports that she quit smoking about 6 years ago.  She

## (undated) PROCEDURE — 02HV33Z INSERTION OF INFUSION DEVICE INTO SUPERIOR VENA CAVA, PERCUTANEOUS APPROACH: ICD-10-PCS

## (undated) DEVICE — Z DISCONTINUED PER MEDLINE (LOW STOCK)  USE 2422770 DRAPE C ARM W54XL78IN FOR FLROSCN

## (undated) DEVICE — MINOR SET UP PK

## (undated) DEVICE — GOWN SIRUS NONREIN XL W/TWL: Brand: MEDLINE INDUSTRIES, INC.

## (undated) DEVICE — ELECTRODE PT RET AD L9FT HI MOIST COND ADH HYDRGEL CORDED

## (undated) DEVICE — DRESSING,GAUZE,XEROFORM,CURAD,1"X8",ST: Brand: CURAD

## (undated) DEVICE — BIT DRL L300MM DIA10MM CANN TAPR L QUIK CPL FOR DH DC TFN

## (undated) DEVICE — STRIP,CLOSURE,WOUND,MEDI-STRIP,1/2X4: Brand: MEDLINE

## (undated) DEVICE — 3M™ COBAN™ NL STERILE NON-LATEX SELF-ADHERENT WRAP, 2084S, 4 IN X 5 YD (10 CM X 4,5 M), 18 ROLLS/CASE: Brand: 3M™ COBAN™

## (undated) DEVICE — ZIMMER® STERILE DISPOSABLE TOURNIQUET CUFF WITH PLC, DUAL PORT, SINGLE BLADDER, 18 IN. (46 CM)

## (undated) DEVICE — SUTURE NONABSORBABLE MONOFILAMENT 4-0 FS-2 18 IN ETHILON 662H

## (undated) DEVICE — GLOVE SURG SZ 85 L12IN FNGR THK79MIL GRN LTX FREE

## (undated) DEVICE — SPONGE GZ W4XL4IN COT 12 PLY TYP VII WVN C FLD DSGN

## (undated) DEVICE — SUTURE VCRL SZ 2-0 L18IN ABSRB UD CT-1 L36MM 1/2 CIR J839D

## (undated) DEVICE — PRECISION THIN (5.5 X 0.38 X 18.0MM)

## (undated) DEVICE — CHLORAPREP 26ML ORANGE

## (undated) DEVICE — Z DISCONTINUED USE 2275686 GLOVE SURG SZ 8 L12IN FNGR THK13MIL WHT ISOLEX POLYISOPRENE

## (undated) DEVICE — Device

## (undated) DEVICE — TOWEL,OR,DSP,ST,BLUE,STD,4/PK,20PK/CS: Brand: MEDLINE

## (undated) DEVICE — LEGGINGS, PAIR, CLEAR, STERILE: Brand: MEDLINE

## (undated) DEVICE — GLOVE SURG SZ 6 L12IN FNGR THK87MIL DK GRN LTX FREE ISOLEX

## (undated) DEVICE — COVER LT HNDL BLU PLAS

## (undated) DEVICE — HIP PINNING: Brand: MEDLINE INDUSTRIES, INC.

## (undated) DEVICE — SYRINGE MED 10ML LUERLOCK TIP W/O SFTY DISP

## (undated) DEVICE — GUIDEWIRE ORTH L400MM DIA3.2MM FOR TFN

## (undated) DEVICE — BANDAGE COMPR W4INXL15FT BGE E SGL LAYERED CLP CLSR

## (undated) DEVICE — ROD RMR L950MM DIA3MM W/ STR BALL TIP

## (undated) DEVICE — SUTURE VCRL SZ 3-0 L18IN ABSRB UD L26MM SH 1/2 CIR J864D

## (undated) DEVICE — T-DRAPE,EXTREMITY,STERILE: Brand: MEDLINE

## (undated) DEVICE — DRAPE,U/SHT,SPLIT,FILM,60X84,STERILE: Brand: MEDLINE

## (undated) DEVICE — CANISTER, RIGID, 1200CC: Brand: MEDLINE INDUSTRIES, INC.

## (undated) DEVICE — TUBING, SUCTION, 1/4" X 12', STRAIGHT: Brand: MEDLINE

## (undated) DEVICE — SMALL TEAR CROSS CUT RASP (11.0 X 5.0MM)

## (undated) DEVICE — INTENDED FOR TISSUE SEPARATION, AND OTHER PROCEDURES THAT REQUIRE A SHARP SURGICAL BLADE TO PUNCTURE OR CUT.: Brand: BARD-PARKER ® STAINLESS STEEL BLADES

## (undated) DEVICE — SOLUTION IV IRRIG POUR BRL 0.9% SODIUM CHL 2F7124

## (undated) DEVICE — DRESSING ALG W4XL8IN AG FOAM SUPERABSORBENT SIL ANTIMIC

## (undated) DEVICE — SYRINGE IRRIG 60ML SFT PLIABLE BLB EZ TO GRP 1 HND USE W/

## (undated) DEVICE — PADDING UNDERCAST W4INXL4YD 100% COT CRIMPED FINISH WBRL II

## (undated) DEVICE — MERCY HEALTH WEST TURNOVER: Brand: MEDLINE INDUSTRIES, INC.

## (undated) DEVICE — NEEDLE HYPO 23GA L1.5IN TURQ POLYPR HUB S STL THN WALL IM

## (undated) DEVICE — SUTURE VCRL + SZ 2-0 L27IN ABSRB CLR CT-1 1/2 CIR TAPERCUT VCP259H

## (undated) DEVICE — STANDARD HYPODERMIC NEEDLE,POLYPROPYLENE HUB: Brand: MONOJECT

## (undated) DEVICE — BANDAGE COMPR W4INXL12FT E DISP ESMARCH EVEN

## (undated) DEVICE — ELECTROSURGICAL PENCIL BUTTON SWITCH NON COATED BLADE ELECTRODE 10 FT (3 M) CORD HOLSTER: Brand: MEGADYNE

## (undated) DEVICE — C-ARM: Brand: UNBRANDED

## (undated) DEVICE — SUTURE VCRL + 1 L27IN ABSRB UD CT-1 L36MM 1/2 CIR TAPR PNT VCP261H

## (undated) DEVICE — GLOVE SURG SZ 6 L12IN FNGR THK87MIL WHT LTX FREE

## (undated) DEVICE — KIT OR ROOM TURNOVER W/STRAP

## (undated) DEVICE — GLOVE SURG SZ 8 L12IN FNGR THK87MIL WHT LTX FREE

## (undated) DEVICE — DRESSING,GAUZE,XEROFORM,CURAD,5"X9",ST: Brand: CURAD

## (undated) DEVICE — BIT DRL L330MM DIA4.2MM CALIB 100MM 3 FLUT QUIK CPL

## (undated) DEVICE — SOLUTION IRRIG 1000ML 0.9% SOD CHL USP POUR PLAS BTL

## (undated) DEVICE — GLOVE SURG SZ 85 L12IN FNGR ORTHO 126MIL CRM LTX FREE

## (undated) DEVICE — SHEET,DRAPE,53X77,STERILE: Brand: MEDLINE